# Patient Record
Sex: MALE | Race: WHITE | Employment: UNEMPLOYED | URBAN - METROPOLITAN AREA
[De-identification: names, ages, dates, MRNs, and addresses within clinical notes are randomized per-mention and may not be internally consistent; named-entity substitution may affect disease eponyms.]

---

## 2018-11-09 ENCOUNTER — HOSPITAL ENCOUNTER (EMERGENCY)
Age: 23
Discharge: HOME | End: 2018-11-09
Attending: EMERGENCY MEDICINE

## 2018-11-09 VITALS
HEIGHT: 71 IN | DIASTOLIC BLOOD PRESSURE: 70 MMHG | RESPIRATION RATE: 18 BRPM | HEART RATE: 62 BPM | TEMPERATURE: 98.4 F | SYSTOLIC BLOOD PRESSURE: 106 MMHG | OXYGEN SATURATION: 100 % | WEIGHT: 145.94 LBS | BODY MASS INDEX: 20.43 KG/M2

## 2018-11-09 DIAGNOSIS — K08.89 PAIN, DENTAL: Primary | ICD-10-CM

## 2018-11-09 DIAGNOSIS — K02.9 DENTAL CARIES: ICD-10-CM

## 2018-11-09 PROCEDURE — 6370000000 HC RX 637 (ALT 250 FOR IP): Performed by: EMERGENCY MEDICINE

## 2018-11-09 PROCEDURE — 99282 EMERGENCY DEPT VISIT SF MDM: CPT

## 2018-11-09 RX ORDER — AMOXICILLIN 250 MG/1
500 CAPSULE ORAL ONCE
Status: COMPLETED | OUTPATIENT
Start: 2018-11-09 | End: 2018-11-09

## 2018-11-09 RX ORDER — IBUPROFEN 600 MG/1
600 TABLET ORAL ONCE
Status: DISCONTINUED | OUTPATIENT
Start: 2018-11-09 | End: 2018-11-09 | Stop reason: HOSPADM

## 2018-11-09 RX ORDER — AMOXICILLIN 500 MG/1
500 CAPSULE ORAL 3 TIMES DAILY
Qty: 30 CAPSULE | Refills: 0 | Status: SHIPPED | OUTPATIENT
Start: 2018-11-09 | End: 2018-11-19

## 2018-11-09 RX ORDER — IBUPROFEN 600 MG/1
600 TABLET ORAL EVERY 6 HOURS PRN
Qty: 20 TABLET | Refills: 0 | Status: SHIPPED | OUTPATIENT
Start: 2018-11-09 | End: 2020-06-19 | Stop reason: ALTCHOICE

## 2018-11-09 RX ADMIN — AMOXICILLIN 500 MG: 250 CAPSULE ORAL at 11:08

## 2018-11-09 ASSESSMENT — PAIN DESCRIPTION - DESCRIPTORS: DESCRIPTORS: DISCOMFORT

## 2018-11-09 ASSESSMENT — PAIN DESCRIPTION - PAIN TYPE: TYPE: ACUTE PAIN

## 2018-11-09 ASSESSMENT — PAIN - FUNCTIONAL ASSESSMENT: PAIN_FUNCTIONAL_ASSESSMENT: 0-10

## 2018-11-09 ASSESSMENT — PAIN SCALES - GENERAL
PAINLEVEL_OUTOF10: 10
PAINLEVEL_OUTOF10: 10

## 2018-11-09 ASSESSMENT — PAIN DESCRIPTION - PROGRESSION: CLINICAL_PROGRESSION: NOT CHANGED

## 2018-11-09 ASSESSMENT — PAIN DESCRIPTION - LOCATION: LOCATION: MOUTH

## 2019-07-02 ENCOUNTER — HOSPITAL ENCOUNTER (EMERGENCY)
Age: 24
Discharge: HOME | End: 2019-07-02
Attending: EMERGENCY MEDICINE

## 2019-07-02 VITALS
RESPIRATION RATE: 14 BRPM | BODY MASS INDEX: 20.52 KG/M2 | OXYGEN SATURATION: 100 % | SYSTOLIC BLOOD PRESSURE: 120 MMHG | HEART RATE: 54 BPM | DIASTOLIC BLOOD PRESSURE: 87 MMHG | WEIGHT: 143.3 LBS | TEMPERATURE: 97.6 F | HEIGHT: 70 IN

## 2019-07-02 DIAGNOSIS — K04.7 DENTAL INFECTION: Primary | ICD-10-CM

## 2019-07-02 PROCEDURE — 99282 EMERGENCY DEPT VISIT SF MDM: CPT

## 2019-07-02 PROCEDURE — 6370000000 HC RX 637 (ALT 250 FOR IP): Performed by: EMERGENCY MEDICINE

## 2019-07-02 PROCEDURE — 4500000022 HC ED LEVEL 2 PROCEDURE

## 2019-07-02 RX ORDER — IBUPROFEN 600 MG/1
600 TABLET ORAL ONCE
Status: COMPLETED | OUTPATIENT
Start: 2019-07-02 | End: 2019-07-02

## 2019-07-02 RX ORDER — AMOXICILLIN AND CLAVULANATE POTASSIUM 875; 125 MG/1; MG/1
1 TABLET, FILM COATED ORAL 2 TIMES DAILY
Qty: 20 TABLET | Refills: 0 | Status: SHIPPED | OUTPATIENT
Start: 2019-07-02 | End: 2019-07-03

## 2019-07-02 RX ORDER — AMOXICILLIN AND CLAVULANATE POTASSIUM 875; 125 MG/1; MG/1
1 TABLET, FILM COATED ORAL EVERY 12 HOURS SCHEDULED
Status: DISCONTINUED | OUTPATIENT
Start: 2019-07-02 | End: 2019-07-02 | Stop reason: HOSPADM

## 2019-07-02 RX ADMIN — AMOXICILLIN AND CLAVULANATE POTASSIUM 1 TABLET: 875; 125 TABLET, FILM COATED ORAL at 18:21

## 2019-07-02 RX ADMIN — IBUPROFEN 600 MG: 600 TABLET ORAL at 18:20

## 2019-07-02 ASSESSMENT — ENCOUNTER SYMPTOMS
WHEEZING: 0
FACIAL SWELLING: 0
VOICE CHANGE: 0
TROUBLE SWALLOWING: 0
SHORTNESS OF BREATH: 0
TRISMUS: 0

## 2019-07-02 ASSESSMENT — PAIN DESCRIPTION - LOCATION: LOCATION: TEETH

## 2019-07-02 ASSESSMENT — PAIN DESCRIPTION - ORIENTATION: ORIENTATION: RIGHT

## 2019-07-02 ASSESSMENT — PAIN DESCRIPTION - FREQUENCY: FREQUENCY: CONTINUOUS

## 2019-07-02 ASSESSMENT — PAIN SCALES - GENERAL
PAINLEVEL_OUTOF10: 10
PAINLEVEL_OUTOF10: 9
PAINLEVEL_OUTOF10: 3

## 2019-07-02 ASSESSMENT — PAIN DESCRIPTION - PAIN TYPE: TYPE: ACUTE PAIN

## 2019-07-02 NOTE — ED PROVIDER NOTES
SURGICAL HISTORY       Past Surgical History:   Procedure Laterality Date    NASAL SEPTUM SURGERY      TONSILLECTOMY      TYMPANOSTOMY TUBE PLACEMENT           CURRENT MEDICATIONS       Previous Medications    IBUPROFEN (IBU) 600 MG TABLET    Take 1 tablet by mouth every 6 hours as needed for Pain       ALLERGIES     Clindamycin; Clindamycin/lincomycin; and Azithromycin    FAMILY HISTORY       Family History   Problem Relation Age of Onset    Diabetes Mother     Depression Mother     Substance Abuse Mother     High Blood Pressure Maternal Grandmother     Mental Illness Father     Substance Abuse Brother           SOCIAL HISTORY       Social History     Socioeconomic History    Marital status: Single     Spouse name: Not on file    Number of children: Not on file    Years of education: Not on file    Highest education level: Not on file   Occupational History    Occupation: unemployed   Social Needs    Financial resource strain: Not on file    Food insecurity:     Worry: Not on file     Inability: Not on file    Transportation needs:     Medical: Not on file     Non-medical: Not on file   Tobacco Use    Smoking status: Current Every Day Smoker     Packs/day: 1.00     Years: 6.00     Pack years: 6.00     Types: Cigarettes    Smokeless tobacco: Never Used   Substance and Sexual Activity    Alcohol use: No     Comment: social    Drug use: No     Frequency: 7.0 times per week     Types: Marijuana, IV, Opiates      Comment: heroin, last used   12/20/2017    Sexual activity: Not on file   Lifestyle    Physical activity:     Days per week: Not on file     Minutes per session: Not on file    Stress: Not on file   Relationships    Social connections:     Talks on phone: Not on file     Gets together: Not on file     Attends Taoism service: Not on file     Active member of club or organization: Not on file     Attends meetings of clubs or organizations: Not on file     Relationship status:

## 2019-07-03 ENCOUNTER — HOSPITAL ENCOUNTER (EMERGENCY)
Age: 24
Discharge: HOME | End: 2019-07-03
Attending: EMERGENCY MEDICINE

## 2019-07-03 VITALS
BODY MASS INDEX: 20.47 KG/M2 | WEIGHT: 143 LBS | SYSTOLIC BLOOD PRESSURE: 145 MMHG | HEART RATE: 56 BPM | HEIGHT: 70 IN | RESPIRATION RATE: 14 BRPM | TEMPERATURE: 98.4 F | DIASTOLIC BLOOD PRESSURE: 85 MMHG | OXYGEN SATURATION: 98 %

## 2019-07-03 DIAGNOSIS — K02.9 DENTAL CARIES: ICD-10-CM

## 2019-07-03 DIAGNOSIS — K04.7 DENTAL ABSCESS: Primary | ICD-10-CM

## 2019-07-03 PROCEDURE — 6370000000 HC RX 637 (ALT 250 FOR IP): Performed by: EMERGENCY MEDICINE

## 2019-07-03 PROCEDURE — 99282 EMERGENCY DEPT VISIT SF MDM: CPT

## 2019-07-03 RX ORDER — AMOXICILLIN 250 MG/1
500 CAPSULE ORAL ONCE
Status: COMPLETED | OUTPATIENT
Start: 2019-07-03 | End: 2019-07-03

## 2019-07-03 RX ORDER — AMOXICILLIN 500 MG/1
500 CAPSULE ORAL 3 TIMES DAILY
Qty: 30 CAPSULE | Refills: 0 | Status: SHIPPED | OUTPATIENT
Start: 2019-07-03 | End: 2019-07-13

## 2019-07-03 RX ADMIN — AMOXICILLIN 500 MG: 250 CAPSULE ORAL at 08:23

## 2019-07-03 ASSESSMENT — PAIN SCALES - GENERAL
PAINLEVEL_OUTOF10: 9

## 2019-07-03 ASSESSMENT — PAIN DESCRIPTION - FREQUENCY: FREQUENCY: CONTINUOUS

## 2019-07-03 ASSESSMENT — PAIN - FUNCTIONAL ASSESSMENT: PAIN_FUNCTIONAL_ASSESSMENT: INTOLERABLE, UNABLE TO DO ANY ACTIVE OR PASSIVE ACTIVITIES

## 2019-07-03 ASSESSMENT — PAIN DESCRIPTION - DESCRIPTORS: DESCRIPTORS: ACHING;THROBBING

## 2019-07-03 ASSESSMENT — PAIN DESCRIPTION - LOCATION: LOCATION: TEETH

## 2019-07-03 ASSESSMENT — PAIN DESCRIPTION - PAIN TYPE: TYPE: CHRONIC PAIN

## 2019-07-03 NOTE — ED PROVIDER NOTES
[id number]      Pt Name: Tong Sullivan  MRN: [id number]  [place] 1995  Date of evaluation: 7/3/2019  Provider: Katlyn Salazar [address]  Chief Complaint   Patient presents with    Dental Pain     right lower jaw with edema. He was seen here yesterday and had dental block  Unable to afford augmentin. Increased edema since yesterday  was given [facility] voucher for meds       HPI  Mack Snow is a 21 y.o. male who presents with toothache in the lower right side. He was seen here last night for similar complaints. He is placed on Augmentin. He has not been on for some Augmentin because of cost $80.  He is going to homeless shelter. He denies any fevers or chills. Denies any nausea or vomiting. He states the swelling is gotten worse since last night. He did a dental block last night. Patient states he laid outside all night long. He is waiting for his uncle to pick them up. He is going to go to a shelter. REVIEW OF SYSTEMS  All systems negative except as noted in the HPI. Reviewed Nurses' notes and concur. No LMP for male patient. PAST MEDICAL HISTORY  Past Medical History:   Diagnosis Date    ADHD (attention deficit hyperactivity disorder)     Cyst     testicles    Depression     Drug abuse, IV ([address]     heroine    Hemorrhoids     Heroin use        FAMILY HISTORY  Family History   Problem Relation Age of Onset    Diabetes Mother     Depression Mother     Substance Abuse Mother     High Blood Pressure Maternal Grandmother     Mental Illness Father     Substance Abuse Brother        SOCIAL HISTORY   reports that he has been smoking cigarettes. He has a 6.00 pack-year smoking history. He has never used smokeless tobacco. He reports that he does not drink alcohol or use drugs.     SURGICAL HISTORY  Past Surgical History:   Procedure Laterality Date    NASAL SEPTUM SURGERY      TONSILLECTOMY

## 2019-10-24 ENCOUNTER — HOSPITAL ENCOUNTER (EMERGENCY)
Age: 24
Discharge: HOME | End: 2019-10-24

## 2019-10-24 VITALS
TEMPERATURE: 99.1 F | SYSTOLIC BLOOD PRESSURE: 114 MMHG | HEART RATE: 103 BPM | DIASTOLIC BLOOD PRESSURE: 75 MMHG | BODY MASS INDEX: 21 KG/M2 | OXYGEN SATURATION: 96 % | RESPIRATION RATE: 18 BRPM | WEIGHT: 146.39 LBS

## 2019-10-24 DIAGNOSIS — J02.0 STREPTOCOCCAL PHARYNGITIS: Primary | ICD-10-CM

## 2019-10-24 LAB — S PYO AG THROAT QL: POSITIVE

## 2019-10-24 PROCEDURE — 6370000000 HC RX 637 (ALT 250 FOR IP): Performed by: PHYSICIAN ASSISTANT

## 2019-10-24 PROCEDURE — 99283 EMERGENCY DEPT VISIT LOW MDM: CPT

## 2019-10-24 PROCEDURE — 87880 STREP A ASSAY W/OPTIC: CPT

## 2019-10-24 RX ORDER — LIDOCAINE HYDROCHLORIDE 20 MG/ML
15 SOLUTION OROPHARYNGEAL ONCE
Status: COMPLETED | OUTPATIENT
Start: 2019-10-24 | End: 2019-10-24

## 2019-10-24 RX ORDER — LIDOCAINE HYDROCHLORIDE 20 MG/ML
15 SOLUTION OROPHARYNGEAL PRN
Qty: 1 BOTTLE | Refills: 0 | Status: SHIPPED | OUTPATIENT
Start: 2019-10-24 | End: 2020-06-19 | Stop reason: ALTCHOICE

## 2019-10-24 RX ORDER — AMOXICILLIN 500 MG/1
500 CAPSULE ORAL 2 TIMES DAILY
Qty: 20 CAPSULE | Refills: 0 | Status: SHIPPED | OUTPATIENT
Start: 2019-10-24 | End: 2019-11-03

## 2019-10-24 RX ADMIN — LIDOCAINE HYDROCHLORIDE 15 ML: 20 SOLUTION ORAL; TOPICAL at 15:13

## 2019-10-24 ASSESSMENT — PAIN SCALES - GENERAL: PAINLEVEL_OUTOF10: 7

## 2019-10-24 ASSESSMENT — PAIN DESCRIPTION - PROGRESSION: CLINICAL_PROGRESSION: GRADUALLY WORSENING

## 2019-10-24 ASSESSMENT — PAIN DESCRIPTION - PAIN TYPE: TYPE: ACUTE PAIN

## 2019-10-24 ASSESSMENT — PAIN DESCRIPTION - ONSET: ONSET: PROGRESSIVE

## 2019-10-24 ASSESSMENT — PAIN DESCRIPTION - LOCATION: LOCATION: THROAT

## 2020-04-24 ENCOUNTER — HOSPITAL ENCOUNTER (EMERGENCY)
Age: 25
Discharge: HOME | End: 2020-04-24
Attending: EMERGENCY MEDICINE

## 2020-04-24 VITALS
RESPIRATION RATE: 14 BRPM | HEART RATE: 82 BPM | OXYGEN SATURATION: 100 % | WEIGHT: 151.24 LBS | SYSTOLIC BLOOD PRESSURE: 121 MMHG | BODY MASS INDEX: 21.7 KG/M2 | TEMPERATURE: 99.5 F | DIASTOLIC BLOOD PRESSURE: 65 MMHG

## 2020-04-24 PROCEDURE — 99282 EMERGENCY DEPT VISIT SF MDM: CPT

## 2020-04-24 RX ORDER — PENICILLIN V POTASSIUM 500 MG/1
500 TABLET ORAL 4 TIMES DAILY
Qty: 40 TABLET | Refills: 0 | Status: SHIPPED | OUTPATIENT
Start: 2020-04-24 | End: 2020-05-04

## 2020-04-24 ASSESSMENT — PAIN DESCRIPTION - ONSET
ONSET: GRADUAL
ONSET: GRADUAL

## 2020-04-24 ASSESSMENT — PAIN DESCRIPTION - PROGRESSION
CLINICAL_PROGRESSION: GRADUALLY WORSENING
CLINICAL_PROGRESSION: NOT CHANGED

## 2020-04-24 ASSESSMENT — PAIN DESCRIPTION - DESCRIPTORS
DESCRIPTORS: THROBBING
DESCRIPTORS: THROBBING

## 2020-04-24 ASSESSMENT — PAIN DESCRIPTION - LOCATION
LOCATION: MOUTH
LOCATION: MOUTH

## 2020-04-24 ASSESSMENT — PAIN DESCRIPTION - FREQUENCY
FREQUENCY: CONTINUOUS
FREQUENCY: CONTINUOUS

## 2020-04-24 ASSESSMENT — PAIN DESCRIPTION - ORIENTATION
ORIENTATION: RIGHT;LOWER
ORIENTATION: RIGHT;LOWER

## 2020-04-24 ASSESSMENT — PAIN DESCRIPTION - PAIN TYPE
TYPE: ACUTE PAIN
TYPE: ACUTE PAIN

## 2020-04-24 ASSESSMENT — PAIN SCALES - GENERAL
PAINLEVEL_OUTOF10: 7
PAINLEVEL_OUTOF10: 7

## 2020-04-24 ASSESSMENT — PAIN - FUNCTIONAL ASSESSMENT: PAIN_FUNCTIONAL_ASSESSMENT: 0-10

## 2020-04-24 NOTE — ED PROVIDER NOTES
of motion. Radial and dorsalis pedis pulses were intact. No calf tenderness erythema or edema. Neurological: Alert and oriented x 3. Speech clear. Cranial nerves II-XII intact. No facial droop. No acute focal motor or sensory deficits. Skin: Skin is warm and dry. No rash. Lymphatic:  No lympadenopathy. Psychiatric: Normal mood and affect. Behavior is normal.         DIAGNOSTIC RESULTS     EKG: All EKG's are interpreted by the Emergency Department Physician who either signs or Co-signs this chart in the absence of a cardiologist.        RADIOLOGY:   Non-plain film images such as CT, Ultrasound and MRI are read by the radiologist. Plain radiographic images are visualized and preliminarily interpreted by the emergency physician with the below findings:        Interpretation per the Radiologist below, if available at the time of this note:    No orders to display         ED BEDSIDE ULTRASOUND:   Performed by ED Physician - none    LABS:  Labs Reviewed - No data to display    All other labs were within normal range or not returned as of this dictation. EMERGENCY DEPARTMENT COURSE and DIFFERENTIAL DIAGNOSIS/MDM:   Vitals:    Vitals:    04/24/20 0942   BP: 121/65   Pulse: 82   Resp: 14   Temp: 99.5 °F (37.5 °C)   TempSrc: Oral   SpO2: 100%   Weight: 151 lb 3.8 oz (68.6 kg)       Patient presented with dental pain as noted above. He does have some gingival soft tissue swelling surrounding the right mandibular third molar was severely decayed and fractured. No evidence of abscess at this time. No evidence of airway compromise. He is afebrile. He is stable for outpatient management. He will be started on penicillin 500 mg 4 times daily for 10 days. Advised him to follow-up with a dentist as soon as possible or new symptoms develop, he was advised to return immediately to the emergency department. Advised him to drink plenty of fluids.   I advised him to follow-up with a primary care physician as

## 2020-04-24 NOTE — ED TRIAGE NOTES
Pt arrived to the ED via private vehicle from home. Pt c/o dental pain, right lower. Right side of face is swollen. Pain 7/10.  Pt states he did use fentanyl this morning

## 2020-06-19 ENCOUNTER — HOSPITAL ENCOUNTER (EMERGENCY)
Age: 25
Discharge: HOME | End: 2020-06-19
Attending: EMERGENCY MEDICINE

## 2020-06-19 ENCOUNTER — APPOINTMENT (OUTPATIENT)
Dept: GENERAL RADIOLOGY | Age: 25
End: 2020-06-19

## 2020-06-19 VITALS
RESPIRATION RATE: 16 BRPM | HEIGHT: 70 IN | TEMPERATURE: 98 F | DIASTOLIC BLOOD PRESSURE: 78 MMHG | OXYGEN SATURATION: 98 % | WEIGHT: 140 LBS | HEART RATE: 90 BPM | SYSTOLIC BLOOD PRESSURE: 130 MMHG | BODY MASS INDEX: 20.04 KG/M2

## 2020-06-19 PROCEDURE — 73610 X-RAY EXAM OF ANKLE: CPT

## 2020-06-19 PROCEDURE — 6370000000 HC RX 637 (ALT 250 FOR IP): Performed by: EMERGENCY MEDICINE

## 2020-06-19 PROCEDURE — 73630 X-RAY EXAM OF FOOT: CPT

## 2020-06-19 PROCEDURE — 87070 CULTURE OTHR SPECIMN AEROBIC: CPT

## 2020-06-19 PROCEDURE — 87205 SMEAR GRAM STAIN: CPT

## 2020-06-19 PROCEDURE — 87077 CULTURE AEROBIC IDENTIFY: CPT

## 2020-06-19 PROCEDURE — 99283 EMERGENCY DEPT VISIT LOW MDM: CPT

## 2020-06-19 RX ORDER — SULFAMETHOXAZOLE AND TRIMETHOPRIM 800; 160 MG/1; MG/1
2 TABLET ORAL ONCE
Status: COMPLETED | OUTPATIENT
Start: 2020-06-19 | End: 2020-06-19

## 2020-06-19 RX ORDER — LORAZEPAM 1 MG/1
1 TABLET ORAL ONCE
Status: COMPLETED | OUTPATIENT
Start: 2020-06-19 | End: 2020-06-19

## 2020-06-19 RX ORDER — CEPHALEXIN 250 MG/1
1000 CAPSULE ORAL ONCE
Status: COMPLETED | OUTPATIENT
Start: 2020-06-19 | End: 2020-06-19

## 2020-06-19 RX ORDER — SULFAMETHOXAZOLE AND TRIMETHOPRIM 800; 160 MG/1; MG/1
1 TABLET ORAL 2 TIMES DAILY
Qty: 20 TABLET | Refills: 0 | Status: SHIPPED | OUTPATIENT
Start: 2020-06-19 | End: 2020-06-29

## 2020-06-19 RX ORDER — IBUPROFEN 800 MG/1
800 TABLET ORAL EVERY 8 HOURS PRN
Qty: 30 TABLET | Refills: 0 | Status: SHIPPED | OUTPATIENT
Start: 2020-06-19 | End: 2020-09-21

## 2020-06-19 RX ORDER — CEPHALEXIN 500 MG/1
500 CAPSULE ORAL 4 TIMES DAILY
Qty: 40 CAPSULE | Refills: 0 | Status: SHIPPED | OUTPATIENT
Start: 2020-06-19 | End: 2020-06-29

## 2020-06-19 RX ORDER — IBUPROFEN 400 MG/1
800 TABLET ORAL ONCE
Status: COMPLETED | OUTPATIENT
Start: 2020-06-19 | End: 2020-06-19

## 2020-06-19 RX ADMIN — LORAZEPAM 1 MG: 1 TABLET ORAL at 12:36

## 2020-06-19 RX ADMIN — IBUPROFEN 800 MG: 400 TABLET, FILM COATED ORAL at 12:36

## 2020-06-19 RX ADMIN — SULFAMETHOXAZOLE AND TRIMETHOPRIM 2 TABLET: 800; 160 TABLET ORAL at 12:37

## 2020-06-19 RX ADMIN — CEPHALEXIN 1000 MG: 250 CAPSULE ORAL at 12:36

## 2020-06-19 ASSESSMENT — PAIN DESCRIPTION - FREQUENCY
FREQUENCY: CONTINUOUS

## 2020-06-19 ASSESSMENT — PAIN SCALES - GENERAL
PAINLEVEL_OUTOF10: 10
PAINLEVEL_OUTOF10: 10
PAINLEVEL_OUTOF10: 5
PAINLEVEL_OUTOF10: 10

## 2020-06-19 ASSESSMENT — PAIN DESCRIPTION - LOCATION
LOCATION: ANKLE;FOOT
LOCATION: ANKLE
LOCATION: ANKLE;FOOT

## 2020-06-19 ASSESSMENT — PAIN DESCRIPTION - ONSET
ONSET: SUDDEN
ONSET: PROGRESSIVE
ONSET: PROGRESSIVE

## 2020-06-19 ASSESSMENT — PAIN DESCRIPTION - PAIN TYPE
TYPE: ACUTE PAIN

## 2020-06-19 ASSESSMENT — PAIN DESCRIPTION - DESCRIPTORS
DESCRIPTORS: ACHING;CONSTANT
DESCRIPTORS: ACHING
DESCRIPTORS: ACHING

## 2020-06-19 ASSESSMENT — PAIN - FUNCTIONAL ASSESSMENT
PAIN_FUNCTIONAL_ASSESSMENT: PREVENTS OR INTERFERES SOME ACTIVE ACTIVITIES AND ADLS
PAIN_FUNCTIONAL_ASSESSMENT: PREVENTS OR INTERFERES SOME ACTIVE ACTIVITIES AND ADLS
PAIN_FUNCTIONAL_ASSESSMENT: ACTIVITIES ARE NOT PREVENTED

## 2020-06-19 ASSESSMENT — PAIN DESCRIPTION - ORIENTATION
ORIENTATION: RIGHT

## 2020-06-19 ASSESSMENT — PAIN DESCRIPTION - PROGRESSION
CLINICAL_PROGRESSION: NOT CHANGED
CLINICAL_PROGRESSION: NOT CHANGED
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
## 2020-06-19 NOTE — ED NOTES
[id number] EMD and RN to bedside. I&D to right ankle abscess. Small amt bloody pus drainage noted. Will not allow EMD to push on wound. this RN cleaned wound with hibiclens and saline. just touching area adjacent to I&D site caused mod amt of pus drainage to come from wound (pt agreeable to this). Area cleaned again and 4x4 gauze and roll gauze bandage applied. Home wound care teaching with pt. Pt asking for pain medicine and crutches. Once this RN finished bandaging wound, pt standing up and pt says \"I can't walk on it, it hurts too much. \"  Pt asking for the doctor to come talk to him so he can ask for crutches himself (Dr Ceja updated about pt's request)     Lida Mosher RN  06/19/20 7868

## 2020-06-21 LAB
GRAM STAIN RESULT: ABNORMAL
ORGANISM: ABNORMAL
WOUND/ABSCESS: ABNORMAL

## 2020-07-25 ENCOUNTER — HOSPITAL ENCOUNTER (EMERGENCY)
Age: 25
Discharge: HOME | End: 2020-07-25
Attending: EMERGENCY MEDICINE

## 2020-07-25 ENCOUNTER — APPOINTMENT (OUTPATIENT)
Dept: GENERAL RADIOLOGY | Age: 25
End: 2020-07-25

## 2020-07-25 VITALS
OXYGEN SATURATION: 96 % | HEART RATE: 81 BPM | RESPIRATION RATE: 20 BRPM | DIASTOLIC BLOOD PRESSURE: 67 MMHG | SYSTOLIC BLOOD PRESSURE: 120 MMHG | TEMPERATURE: 101.4 F

## 2020-07-25 PROCEDURE — 6370000000 HC RX 637 (ALT 250 FOR IP): Performed by: EMERGENCY MEDICINE

## 2020-07-25 PROCEDURE — 73630 X-RAY EXAM OF FOOT: CPT

## 2020-07-25 PROCEDURE — 99283 EMERGENCY DEPT VISIT LOW MDM: CPT

## 2020-07-25 RX ORDER — CEPHALEXIN 500 MG/1
500 CAPSULE ORAL 4 TIMES DAILY
Qty: 28 CAPSULE | Refills: 0 | Status: SHIPPED | OUTPATIENT
Start: 2020-07-25 | End: 2020-08-01

## 2020-07-25 RX ORDER — SULFAMETHOXAZOLE AND TRIMETHOPRIM 800; 160 MG/1; MG/1
1 TABLET ORAL 2 TIMES DAILY
Qty: 14 TABLET | Refills: 0 | Status: SHIPPED | OUTPATIENT
Start: 2020-07-25 | End: 2020-07-25 | Stop reason: SDUPTHER

## 2020-07-25 RX ORDER — ACETAMINOPHEN 500 MG
1000 TABLET ORAL ONCE
Status: COMPLETED | OUTPATIENT
Start: 2020-07-25 | End: 2020-07-25

## 2020-07-25 RX ORDER — DOXYCYCLINE HYCLATE 100 MG
100 TABLET ORAL ONCE
Status: COMPLETED | OUTPATIENT
Start: 2020-07-25 | End: 2020-07-25

## 2020-07-25 RX ORDER — SULFAMETHOXAZOLE AND TRIMETHOPRIM 800; 160 MG/1; MG/1
1 TABLET ORAL 2 TIMES DAILY
Qty: 14 TABLET | Refills: 0 | Status: SHIPPED | OUTPATIENT
Start: 2020-07-25 | End: 2020-08-01

## 2020-07-25 RX ORDER — CEPHALEXIN 500 MG/1
500 CAPSULE ORAL 4 TIMES DAILY
Qty: 28 CAPSULE | Refills: 0 | Status: SHIPPED | OUTPATIENT
Start: 2020-07-25 | End: 2020-07-25 | Stop reason: SDUPTHER

## 2020-07-25 RX ORDER — DOXYCYCLINE 100 MG/1
100 TABLET ORAL 2 TIMES DAILY
Qty: 20 TABLET | Refills: 0 | Status: SHIPPED | OUTPATIENT
Start: 2020-07-25 | End: 2020-07-25 | Stop reason: ALTCHOICE

## 2020-07-25 RX ADMIN — ACETAMINOPHEN 1000 MG: 500 TABLET, FILM COATED ORAL at 18:30

## 2020-07-25 RX ADMIN — DOXYCYCLINE HYCLATE 100 MG: 100 TABLET, COATED ORAL at 18:30

## 2020-07-25 ASSESSMENT — PAIN SCALES - GENERAL
PAINLEVEL_OUTOF10: 7

## 2020-07-25 ASSESSMENT — PAIN DESCRIPTION - DESCRIPTORS: DESCRIPTORS: ACHING

## 2020-07-25 ASSESSMENT — PAIN DESCRIPTION - ORIENTATION: ORIENTATION: LEFT

## 2020-07-25 ASSESSMENT — PAIN DESCRIPTION - LOCATION: LOCATION: ANKLE;FOOT

## 2020-07-25 NOTE — ED NOTES
Patient returned to ER, unable to find his discharge instructions or prescriptions     Austin Cardoso RN  07/25/20 1956

## 2020-07-25 NOTE — ED PROVIDER NOTES
1.00     Years: 6.00     Pack years: 6.00     Types: Cigarettes    Smokeless tobacco: Never Used   Substance and Sexual Activity    Alcohol use: No     Comment: social    Drug use: Yes     Frequency: 7.0 times per week     Types: Marijuana, IV, Opiates      Comment: on methadone but states he slips up    Sexual activity: Not on file   Lifestyle    Physical activity     Days per week: Not on file     Minutes per session: Not on file    Stress: Not on file   Relationships    Social connections     Talks on phone: Not on file     Gets together: Not on file     Attends Advent service: Not on file     Active member of club or organization: Not on file     Attends meetings of clubs or organizations: Not on file     Relationship status: Not on file    Intimate partner violence     Fear of current or ex partner: Not on file     Emotionally abused: Not on file     Physically abused: Not on file     Forced sexual activity: Not on file   Other Topics Concern    Not on file   Social History Narrative    Not on file     No current facility-administered medications for this encounter.       Current Outpatient Medications   Medication Sig Dispense Refill    cephALEXin (KEFLEX) 500 MG capsule Take 1 capsule by mouth 4 times daily for 7 days 28 capsule 0    sulfamethoxazole-trimethoprim (BACTRIM DS) 800-160 MG per tablet Take 1 tablet by mouth 2 times daily for 7 days 14 tablet 0    ibuprofen (ADVIL;MOTRIN) 800 MG tablet Take 1 tablet by mouth every 8 hours as needed for Pain 30 tablet 0    METHADONE HCL PO Take by mouth       Allergies   Allergen Reactions    Clindamycin     Clindamycin/Lincomycin Itching    Azithromycin Itching         REVIEW OF SYSTEMS  10 systems reviewed, pertinent positives per HPI otherwise noted to be negative    PHYSICAL EXAM  /67   Pulse 86   Temp 101.4 °F (38.6 °C) (Oral)   Resp 20   SpO2 97%      CONSTITUTIONAL: AOx4, cooperative with exam, febrile   HEAD: normocephalic, atraumatic   EYES: PERRL, EOMI, anicteric sclera   ENT: Moist mucous membranes, uvula midline   LUNGS: Bilateral breath sounds, CTAB, no rales/ronchi/wheezes   CARDIOVASCULAR: RRR, normal S1/S2, no m/r/g, 2+ pulses throughout   ABDOMEN: Soft, non-tender, non-distended, +BS   NEUROLOGIC:  MAEx4, 5/5 strength throughout; fine touch sensation intact throughout; normal gait; finger-to-nose testing normal;   MUSCULOSKELETAL:  Mild tenderness to the dorsal aspect of the left foot, no deformity, erythema as stated below, no fluctuance, no drainage, no tenderness of the medial lateral malleolus of the left ankle, Achilles tendon intact and nontender, DP pulse 2+   SKIN: No rash, 3 cm x 3 cm area of erythema on the dorsal medial aspect of the left foot with small central scab          RADIOLOGY  X-RAYS:  I have reviewed radiologic plain film image(s). ALL OTHER NON-PLAIN FILM IMAGES SUCH AS CT, ULTRASOUND AND MRI HAVE BEEN READ BY THE RADIOLOGIST. XR FOOT LEFT (MIN 3 VIEWS)   Final Result   Suspected soft tissue swelling in the medial left hindfoot potentially due to   cellulitis given the clinical findings. No underlying radiopaque foreign   body is identified. EKG INTERPRETATION  None    PROCEDURES    ED COURSE/MDM  Cellulitis, foreign body, contusion, fracture    Patient seen and evaluated. History and physical as above. Nontoxic, afebrile. Patient with tenderness and redness to the dorsal aspect of the left foot. Patient does inject IV heroin in his feet but states he is not used his left foot and proximally 1 week. Plain film of the left foot was obtained which showed no evidence of foreign body or radiopaque object in his foot. Does show some soft tissue swelling consistent with cellulitis. Patient is febrile. Treated with Tylenol for fever. Started on doxycycline for antibiotic coverage. 6:10 PM EDT  Patient now very drowsy compared to when he arrived.   Patient does arouse to verbal stimulus and is maintaining his oxygen saturation. 6:41 PM EDT  Patient much more awake now. Discussed that he has cellulitis needs to start antibiotics. Patient requesting prescriptions that are free from the [address] [place] [address] Avenue. Patient's antibiotic switched to Keflex and Bactrim. Return instructions provided. All questions answered prior to discharge. I estimate there is LOW risk for CELLULITIS, COMPARTMENT SYNDROME, NECROTIZING FASCIITIS, TENDON OR NEUROVASCULAR INJURY, or FOREIGN BODY, thus I consider the discharge disposition reasonable. Also, there is no evidence or peritonitis, sepsis, or toxicity. Mack Sevilla and I have discussed the diagnosis and risks, and we agree with discharging home to follow-up with their primary doctor. We also discussed returning to the Emergency Department immediately if new or worsening symptoms occur. We have discussed the symptoms which are most concerning (e.g., changing or worsening pain, fever, numbness, weakness, cool or painful digits) that necessitate immediate return. Patient was given scripts for the following medications. I counseled patient how to take these medications. New Prescriptions    CEPHALEXIN (KEFLEX) 500 MG CAPSULE    Take 1 capsule by mouth 4 times daily for 7 days    SULFAMETHOXAZOLE-TRIMETHOPRIM (BACTRIM DS) 800-160 MG PER TABLET    Take 1 tablet by mouth 2 times daily for 7 days           CLINICAL IMPRESSION  1. Cellulitis of left foot        Blood pressure 120/67, pulse 86, temperature 101.4 °F (38.6 °C), temperature source Oral, resp. rate 20, SpO2 97 %. DISPOSITION  Patient was discharged to home in good condition. [facility]  [phone]  Call today  For a follow up appointment. Disclaimer: All medical record entries made by [employer] dictation.       (Please note that this note was completed with a voice recognition program. Every attempt was made to edit the dictations, but inevitably there remain words

## 2020-07-25 NOTE — ED NOTES
Reviewed AVS and discharge home care instructions with patient. Pt verbalized understanding and had no questions at this time. Pt sent home with prescription x2.      Leah Khan RN  07/25/20 2686

## 2020-08-05 ENCOUNTER — HOSPITAL ENCOUNTER (EMERGENCY)
Age: 25
Discharge: HOME | End: 2020-08-05

## 2020-08-05 VITALS
HEART RATE: 98 BPM | BODY MASS INDEX: 20.06 KG/M2 | HEIGHT: 71 IN | WEIGHT: 143.3 LBS | SYSTOLIC BLOOD PRESSURE: 120 MMHG | DIASTOLIC BLOOD PRESSURE: 70 MMHG | TEMPERATURE: 99 F | RESPIRATION RATE: 18 BRPM | OXYGEN SATURATION: 95 %

## 2020-08-05 PROCEDURE — 10060 I&D ABSCESS SIMPLE/SINGLE: CPT

## 2020-08-05 PROCEDURE — 99282 EMERGENCY DEPT VISIT SF MDM: CPT

## 2020-08-05 RX ORDER — SULFAMETHOXAZOLE AND TRIMETHOPRIM 800; 160 MG/1; MG/1
1 TABLET ORAL 2 TIMES DAILY
Qty: 20 TABLET | Refills: 0 | Status: SHIPPED | OUTPATIENT
Start: 2020-08-05 | End: 2020-08-15

## 2020-08-05 RX ORDER — CEPHALEXIN 500 MG/1
500 CAPSULE ORAL 4 TIMES DAILY
Qty: 40 CAPSULE | Refills: 0 | Status: SHIPPED | OUTPATIENT
Start: 2020-08-05 | End: 2020-09-21

## 2020-08-05 ASSESSMENT — ENCOUNTER SYMPTOMS
EYE DISCHARGE: 0
EYE REDNESS: 0
NAUSEA: 0
BACK PAIN: 0
ABDOMINAL PAIN: 0
SHORTNESS OF BREATH: 0
APNEA: 0
CHOKING: 0
FACIAL SWELLING: 0
VOMITING: 0

## 2020-08-05 ASSESSMENT — PAIN DESCRIPTION - LOCATION: LOCATION: FOOT

## 2020-08-05 ASSESSMENT — PAIN DESCRIPTION - FREQUENCY: FREQUENCY: CONTINUOUS

## 2020-08-05 ASSESSMENT — PAIN DESCRIPTION - DESCRIPTORS: DESCRIPTORS: ACHING;BURNING;THROBBING

## 2020-08-05 ASSESSMENT — PAIN DESCRIPTION - ORIENTATION: ORIENTATION: RIGHT

## 2020-08-05 ASSESSMENT — PAIN SCALES - GENERAL: PAINLEVEL_OUTOF10: 5

## 2020-08-05 ASSESSMENT — PAIN DESCRIPTION - PAIN TYPE: TYPE: ACUTE PAIN

## 2020-08-05 NOTE — ED PROVIDER NOTES
**EVALUATED BY ADVANCED PRACTICE PROVIDER**        [address] ENCOUNTER      Pt Name: Kan Mccallum  SAIRA:[id number]  [place] 1995  Date of evaluation: 8/5/2020  Provider: Félix Kahn PA-C      Chief Complaint:    Chief Complaint   Patient presents with    Abscess     to R foot x5-6 days, pain 5/10, worse when walking. Nursing Notes, Past Medical Hx, Past Surgical Hx, Social Hx, Allergies, and Family Hx were all reviewed and agreed with or any disagreements were addressed in the HPI.    HPI:  (Location, Duration, Timing, Severity, Quality, Assoc Sx, Context, Modifying factors)  This is a  31 y.o. male complaint of abscess to his right foot. Patient does admit to injecting heroin. But he said he does not inject into his foot. Present for the last week. He did state he poked at it with insulin needle. Got no drainage. Very tender. Denies fever. No numbness or tingling in his feet. No other complaints. PastMedical/Surgical History:      Diagnosis Date    ADHD (attention deficit hyperactivity disorder)     Cyst     testicles    Depression     Drug abuse, IV ([facility] [address]     heroine    Hemorrhoids     Heroin use          Procedure Laterality Date    NASAL SEPTUM SURGERY      TONSILLECTOMY      TYMPANOSTOMY TUBE PLACEMENT         Medications:  Previous Medications    IBUPROFEN (ADVIL;MOTRIN) 800 MG TABLET    Take 1 tablet by mouth every 8 hours as needed for Pain    METHADONE HCL PO    Take by mouth         Review of Systems:  Review of Systems   Constitutional: Negative for chills and fever. HENT: Negative for congestion, facial swelling and sneezing. Eyes: Negative for discharge and redness. Respiratory: Negative for apnea, choking and shortness of breath. Cardiovascular: Negative for chest pain. Gastrointestinal: Negative for abdominal pain, nausea and vomiting. Genitourinary: Negative for dysuria.    Musculoskeletal: Negative for back pain, neck pain and neck stiffness. Neurological: Negative for dizziness, tremors, seizures and headaches. All other systems reviewed and are negative. Positives and Pertinent negatives as per HPI. Except as noted above in the ROS, problem specific ROS was completed and is negative. Physical Exam:  Physical Exam  Vitals signs and nursing note reviewed. Constitutional:       Appearance: He is well-developed. He is not diaphoretic. HENT:      Head: Normocephalic and atraumatic. Nose: Nose normal.      Mouth/Throat:      Mouth: Mucous membranes are moist.      Pharynx: Oropharynx is clear. Eyes:      General:         Right eye: No discharge. Left eye: No discharge. Neck:      Musculoskeletal: Normal range of motion and neck supple. Cardiovascular:      Rate and Rhythm: Normal rate and regular rhythm. Heart sounds: Normal heart sounds. No murmur. No friction rub. No gallop. Pulmonary:      Effort: Pulmonary effort is normal. No respiratory distress. Breath sounds: Normal breath sounds. No wheezing or rales. Chest:      Chest wall: No tenderness. Musculoskeletal: Normal range of motion. Right foot: Normal range of motion and normal capillary refill. Tenderness and swelling present. No bony tenderness, crepitus or deformity. Skin:     General: Skin is warm and dry. Neurological:      Mental Status: He is alert and oriented to person, place, and time. Psychiatric:         Behavior: Behavior normal.         MEDICAL DECISION MAKING    Vitals:    Vitals:    08/05/20 1833   BP: 120/70   Pulse: 98   Resp: 18   Temp: 99 °F (37.2 °C)   TempSrc: Oral   SpO2: 95%   Weight: 143 lb 4.8 oz (65 kg)   Height: 5' 10.5\" (1.791 m)       LABS:Labs Reviewed - No data to display     Remainder of labs reviewed and werenegative at this time or not returned at the time of this note.     RADIOLOGY:   Non-plain film images such as CT, Ultrasound and MRI are read by the radiologist. Sommer Ricci PA-C have directly visualized the radiologic plain film image(s) with the below findings:        Interpretation per the Radiologist below, if available at the time of this note:    No orders to display        Xr Foot Left (min 3 Views)    Result Date: 7/25/2020  EXAMINATION: THREE XRAY VIEWS OF THE LEFT FOOT 7/25/2020 5:06 pm COMPARISON: Left foot radiograph 06/30/2016 HISTORY: ORDERING SYSTEM PROVIDED HISTORY: Pain on the medial dorsal aspect of the foot, possible retained needle from IVDU or injury TECHNOLOGIST PROVIDED HISTORY: Reason for exam:->Pain on the medial dorsal aspect of the foot, possible retained needle from IVDU or injury Reason for Exam: Pain on the medial dorsal aspect of the foot, possible retained needle from IVDU or injury Acuity: Acute Type of Exam: Initial Mechanism of Injury: ? FINDINGS: No acute fractures nor areas of abnormal cortical disruption. Normal variant bipartite tibial hallux sesamoid. Joints maintain anatomic alignment. No significant degenerative changes. Suspected soft tissue swelling in the medial hindfoot. No evident skin defect, soft tissue gas, nor radiopaque foreign body. Suspected soft tissue swelling in the medial left hindfoot potentially due to cellulitis given the clinical findings. No underlying radiopaque foreign body is identified. MEDICAL DECISION MAKING / ED COURSE:      PROCEDURES:   Procedures    None    Patient was given:  Medications - No data to display      Emergency room course: Patient on exam the right lower extremity hip was nontender knee nontender. Right foot shows abscess about 2 cm in diameter nonfluctuant. Indurated with a scab. There is a 4 cm area of erythema. Very tender with palpation. Full range of motion all digits capillary refill less than 2+, pedal pulse 2+. Full plantar dorsiflexion full inversion eversion. See photograph below.               Discussed patient option to have an incision and drainage and placed on antibiotics. Patient did not want to have it cut open. Just wants to be put on antibiotics. I informed him I will do that but he is apply warm compresses over the area 3-4 times a day. If that gets worse increased swelling, redness, fluid filled. It will need to be opened up. Patient verbally stated he understood. We did outline the area for him. The patient tolerated their visit well. I evaluated the patient. The physician was available for consultation as needed. The patient and / or the family were informed of the results of any tests, a time was given to answer questions, a plan was proposed and they agreed with plan. CLINICAL IMPRESSION:  1. Abscess of right foot        DISPOSITION Decision To Discharge 08/05/2020 06:49:12 PM      PATIENT REFERRED TO:  No follow-up provider specified.     DISCHARGE MEDICATIONS:  New Prescriptions    CEPHALEXIN (KEFLEX) 500 MG CAPSULE    Take 1 capsule by mouth 4 times daily    SULFAMETHOXAZOLE-TRIMETHOPRIM (BACTRIM DS) 800-160 MG PER TABLET    Take 1 tablet by mouth 2 times daily for 10 days       DISCONTINUED MEDICATIONS:  Discontinued Medications    No medications on file              (Please note the MDM and HPI sections of this note were completed with a voice recognition program.  Efforts were made to edit the dictations but occasionally words are mis-transcribed.)    Electronically signed, Deborah Patten PA-C,          Deborah Patten PA-C  08/05/20 0078

## 2020-08-05 NOTE — ED NOTES
Acknowledged pt by pt's name. Verified pt by name and date of birth. Checked arm band, allergies, reviewed past medical history. Introduced myself to patient  Duration of ED plan of care explained to patient  Explained planned tests and procedures  Thanked patient for coming to [facility].    Asked if there was anything else I could do for the patient before exiting room. CB in reach.      Melissa Garcia RN  08/05/20 8022

## 2020-09-21 ENCOUNTER — HOSPITAL ENCOUNTER (EMERGENCY)
Age: 25
Discharge: LEFT BEFORE DISCHARGE | End: 2020-09-21
Attending: EMERGENCY MEDICINE

## 2020-09-21 VITALS
HEART RATE: 71 BPM | OXYGEN SATURATION: 98 % | TEMPERATURE: 98 F | RESPIRATION RATE: 15 BRPM | HEIGHT: 71 IN | DIASTOLIC BLOOD PRESSURE: 73 MMHG | SYSTOLIC BLOOD PRESSURE: 99 MMHG | BODY MASS INDEX: 20.44 KG/M2 | WEIGHT: 146.01 LBS

## 2020-09-21 PROCEDURE — 99283 EMERGENCY DEPT VISIT LOW MDM: CPT

## 2020-09-21 RX ORDER — NALOXONE HYDROCHLORIDE 1 MG/ML
1 INJECTION INTRAMUSCULAR; INTRAVENOUS; SUBCUTANEOUS ONCE
Status: DISCONTINUED | OUTPATIENT
Start: 2020-09-21 | End: 2020-09-21

## 2020-09-21 ASSESSMENT — PAIN SCALES - GENERAL: PAINLEVEL_OUTOF10: 0

## 2020-09-21 NOTE — LETTER
[address] [facility] [address]  Phone: [phone]    Patient: Dany Aguilar  YOB: 1995  Date: 9/21/2020 Time: 7:19 PM    Leaving the Hospital Against Medical Advice    Chart #:[id number]    This will certify that I, the undersigned,    ______________________________________________________________________    A patient in the above named medical center, having requested discharge and removal from the medical center against the advice of my attending physician(s), hereby release the Emergency Department, its physicians, officers and employees, severally and individually, from any and all liability of any nature whatsoever for any injury or harm or complication of any kind that may result directly or indirectly, by reason of my terminating my stay as a patient from [facility], and hereby waive any and all rights of action I may now have or later acquire as a result of my voluntary departure from [facility] and the termination of my stay as a patient therein. This release is made with the full knowledge of the danger that may result from the action which I am taking.       Date:_______________________                         ___________________________                                                                                    Patient/Legal Representative    Witness:        ____________________________                          ___________________________  Nurse                                                                        Physician

## 2020-09-21 NOTE — ED PROVIDER NOTES
[address] [facility]  EMERGENCYDEPARTMENT ENCOUNTER      Pt Name: Taiwo Sanabria  MRN: [id number]  Nickgfrichard [id number] evaluation: 9/21/2020  Provider:Vahid Ordonez MD    CHIEF COMPLAINT       Chief Complaint   Patient presents with    Drug Overdose     states someone gave him 4 bars of xanax with fentanyl. Was not given narcan         HISTORY OF PRESENT ILLNESS    Mack Patterson is a 31 y.o. male who presents to the emergency department with alprazolam overdose. Patient says that he took 2 Xanax. Says it was not him to harm self. Says it was roughly an hour and a half 2 hours prior to EMSs arrival.  EMS says that the patient took fentanyl with this. No Narcan was given, patient is arousable although somnolent. Patient tells me that it was too Xanax but per report it was for bars. He says over 1 mg each. Nursing Notes were reviewed. REVIEW OF SYSTEMS       Review of Systems    10 point review of systems was performed and was negative exceptas specifically noted in the HPI.       PAST MEDICAL HISTORY     Past Medical History:   Diagnosis Date    ADHD (attention deficit hyperactivity disorder)     Cyst     testicles    Depression     Drug abuse, IV ([address]     heroine    Hemorrhoids     Heroin use          SURGICAL HISTORY       Past Surgical History:   Procedure Laterality Date    NASAL SEPTUM SURGERY      TONSILLECTOMY      TYMPANOSTOMY TUBE PLACEMENT           CURRENT MEDICATIONS       Previous Medications    BUPRENORPHINE HCL (SUBUTEX SL)    Place under the tongue       ALLERGIES     Clindamycin; Clindamycin/lincomycin; and Azithromycin    FAMILY HISTORY       Family History   Problem Relation Age of Onset    Diabetes Mother     Depression Mother     Substance Abuse Mother     High Blood Pressure Maternal Grandmother     Mental Illness Father     Substance Abuse Brother           SOCIAL HISTORY       Social History     Socioeconomic History    Marital status: Single     Spouse name: None    Number of children: None    Years of education: None    Highest education level: None   Occupational History    Occupation: unemployed   Social Needs    Financial resource strain: None    Food insecurity     Worry: None     Inability: None    Transportation needs     Medical: None     Non-medical: None   Tobacco Use    Smoking status: Current Every Day Smoker     Packs/day: 1.00     Years: 6.00     Pack years: 6.00     Types: Cigarettes    Smokeless tobacco: Never Used   Substance and Sexual Activity    Alcohol use: No     Comment: social    Drug use: Yes     Frequency: 7.0 times per week     Types: Marijuana, IV, Opiates      Comment: on methadone but states he slips up, admits to using heroin    Sexual activity: None   Lifestyle    Physical activity     Days per week: None     Minutes per session: None    Stress: None   Relationships    Social connections     Talks on phone: None     Gets together: None     Attends [employer] service: None     Active member of club or organization: None     Attends meetings of clubs or organizations: None     Relationship status: None    Intimate partner violence     Fear of current or ex partner: None     Emotionally abused: None     Physically abused: None     Forced sexual activity: None   Other Topics Concern    None   Social History Narrative    None       SCREENINGS             PHYSICAL EXAM       ED Triage Vitals [09/21/20 1856]   BP Temp Temp Source Pulse Resp SpO2 Height Weight   99/73 98 °F (36.7 °C) Infrared 71 15 98 % 5' 10.5\" (1.791 m) 146 lb 0.1 oz (66.2 kg)       Physical Exam  General appearance: Alert, cooperative, no distress, appears stated age. Head:  Normocephalic, without obvious abnormality, atraumatic.   HEENT: Mucous membranes moist.  Neck: Full ROM, trachea midline, no JVD  Lungs: No respiratory distress  Cardiovasular: Perfusing extremities  Abdomen: Nontender, no guarding  Extremities: Atraumatic, full about heroin or fentanyl, but does say that he would be okay with me saying the he overdosed on the alprazolam.    Patient's uncle came to the emergency department. He initially was not willing to take responsibility for the patient since the patient did not have any place to stay. Patient became very agitated, threatened to \"swing on anybody\", I tried verbal de-escalation but the patient was getting more agitated so we did call the police. Patient did eventually find a place to go and his uncle gave consent. At this point the patient is certainly not at risk for any airway compromise, he is loudly screaming and agitated at this point. Patient discharged to home. REASSESSMENT          CRITICAL CARE TIME   Critical Care time was 0 minutes, excluding separately reportable procedures. There was a high probability of clinically significant/life threatening deteriorationin the patient's condition which required my urgent intervention. CONSULTS:  None     PROCEDURES:  Unless otherwise noted below, none     Procedures    FINAL IMPRESSION      1.  Benzodiazepine overdose, accidental or unintentional, initial encounter          DISPOSITION/PLAN   DISPOSITION [place] 09/21/2020 07:07:29 PM      PATIENT REFERRED TO:  [facility]) Pre-Services  [phone]  Schedule an appointment as soon as possible for a visit   To establish primary care    [place] [address]  [phone]    If symptoms worsen      DISCHARGE MEDICATIONS:  New Prescriptions    No medications on file          (Please note that portions of this note were completed with a voicerecognition program.  Efforts were made to edit the dictations but occasionally words are mis-transcribed.)    Angela Ibarra MD (electronically signed)  Attending Emergency Physician           Angela Ibarra MD  09/21/20 2009

## 2020-09-22 NOTE — ED NOTES
Pt becoming agitated and irritated. Pt wont go back in room and out in hallway. Security with patient.      Sophie Carpenter RN  09/21/20 2011

## 2020-09-22 NOTE — ED NOTES
135 called for police support  Pt threatening to leave, being aggressive towards staff members.       Samy Wiseman, AIMEE  09/21/20 2012

## 2020-11-05 ENCOUNTER — HOSPITAL ENCOUNTER (EMERGENCY)
Age: 25
Discharge: HOME | End: 2020-11-05
Payer: MEDICAID

## 2020-11-05 VITALS
WEIGHT: 144.4 LBS | BODY MASS INDEX: 20.67 KG/M2 | SYSTOLIC BLOOD PRESSURE: 115 MMHG | TEMPERATURE: 98.6 F | DIASTOLIC BLOOD PRESSURE: 76 MMHG | RESPIRATION RATE: 22 BRPM | HEART RATE: 85 BPM | OXYGEN SATURATION: 98 % | HEIGHT: 70 IN

## 2020-11-05 PROCEDURE — 99284 EMERGENCY DEPT VISIT MOD MDM: CPT

## 2020-11-05 PROCEDURE — 6370000000 HC RX 637 (ALT 250 FOR IP): Performed by: PHYSICIAN ASSISTANT

## 2020-11-05 RX ORDER — PENICILLIN V POTASSIUM 500 MG/1
500 TABLET ORAL 4 TIMES DAILY
Qty: 28 TABLET | Refills: 0 | Status: SHIPPED | OUTPATIENT
Start: 2020-11-05 | End: 2020-11-12

## 2020-11-05 RX ORDER — ACETAMINOPHEN 325 MG/1
650 TABLET ORAL ONCE
Status: COMPLETED | OUTPATIENT
Start: 2020-11-05 | End: 2020-11-05

## 2020-11-05 RX ORDER — NAPROXEN 250 MG/1
500 TABLET ORAL ONCE
Status: COMPLETED | OUTPATIENT
Start: 2020-11-05 | End: 2020-11-05

## 2020-11-05 RX ORDER — NAPROXEN 500 MG/1
500 TABLET ORAL 2 TIMES DAILY PRN
Qty: 20 TABLET | Refills: 0 | Status: SHIPPED | OUTPATIENT
Start: 2020-11-05 | End: 2021-02-07

## 2020-11-05 RX ORDER — PENICILLIN V POTASSIUM 500 MG/1
500 TABLET ORAL ONCE
Status: COMPLETED | OUTPATIENT
Start: 2020-11-05 | End: 2020-11-05

## 2020-11-05 RX ADMIN — ACETAMINOPHEN 650 MG: 325 TABLET ORAL at 16:21

## 2020-11-05 RX ADMIN — NAPROXEN 500 MG: 250 TABLET ORAL at 16:21

## 2020-11-05 RX ADMIN — PENICILLIN V POTASIUM 500 MG: 500 TABLET OROPHARYNGEAL at 16:21

## 2020-11-05 ASSESSMENT — ENCOUNTER SYMPTOMS
NAUSEA: 0
DIARRHEA: 0
ABDOMINAL PAIN: 0
SHORTNESS OF BREATH: 0
EYE PAIN: 0
COUGH: 0
BACK PAIN: 0
VOMITING: 0

## 2020-11-05 ASSESSMENT — PAIN SCALES - GENERAL
PAINLEVEL_OUTOF10: 7

## 2020-11-05 ASSESSMENT — PAIN DESCRIPTION - DESCRIPTORS
DESCRIPTORS: ACHING;THROBBING
DESCRIPTORS: THROBBING

## 2020-11-05 ASSESSMENT — PAIN DESCRIPTION - LOCATION
LOCATION: TEETH
LOCATION: TEETH

## 2020-11-05 ASSESSMENT — PAIN DESCRIPTION - ORIENTATION: ORIENTATION: UPPER;ANTERIOR;RIGHT

## 2020-11-05 NOTE — ED PROVIDER NOTES
[address] [facility] ENCOUNTER        Pt Name: Jessy Hidalgo  MRN: [id number]  [place] 1995  Date of evaluation: 11/5/2020  Provider: CHRIS Reynoso  PCP: No primary care provider on file. MINI. I have evaluated this patient. My supervising physician was available for consultation. CHIEF COMPLAINT       Chief Complaint   Patient presents with    Dental Injury     PT chipped tooth eating candy yesterday (front top right)        HISTORY OF PRESENT ILLNESS   (Location, Timing/Onset, Context/Setting, Quality, Duration, Modifying Factors, Severity, Associated Signs and Symptoms)  Note limiting factors. Mack Uriostegui is a 31 y.o. male who presents for evaluation of dental pain. Patient broke his upper left tooth while eating yesterday. Broke down to the gumline. Reports pain at this area and pain above in the gumline. Pain rated 7/10. Sharp and aching. Does not have a dentist. Just recently got insurance. The patient denies headache, fever or chills. No recent antibiotics. No other acute concerns, associated symptoms or modifying factors. Nursing Notes were all reviewed and agreed with or any disagreements were addressed in the HPI. REVIEW OF SYSTEMS    (2-9 systems for level 4, 10 or more for level 5)     Review of Systems   Constitutional: Negative for chills, fatigue and fever. HENT: Positive for dental problem. Eyes: Negative for pain. Respiratory: Negative for cough and shortness of breath. Cardiovascular: Negative for chest pain. Gastrointestinal: Negative for abdominal pain, diarrhea, nausea and vomiting. Genitourinary: Negative for dysuria. Musculoskeletal: Negative for back pain, neck pain and neck stiffness. Skin: Negative for rash. Neurological: Negative for dizziness and headaches. Psychiatric/Behavioral: Negative for confusion. Positives and Pertinent negatives as per HPI.   Except as noted above in the ROS, all other systems were reviewed and negative. PAST MEDICAL HISTORY     Past Medical History:   Diagnosis Date    ADHD (attention deficit hyperactivity disorder)     Cyst     testicles    Depression     Drug abuse, IV ([address]     heroine    Hemorrhoids     Heroin use          SURGICAL HISTORY     Past Surgical History:   Procedure Laterality Date    NASAL SEPTUM SURGERY      TONSILLECTOMY      TYMPANOSTOMY TUBE PLACEMENT           CURRENTMEDICATIONS       Previous Medications    BUPRENORPHINE HCL (SUBUTEX SL)    Place under the tongue         ALLERGIES     Clindamycin; Clindamycin/lincomycin; and Azithromycin    FAMILYHISTORY       Family History   Problem Relation Age of Onset    Diabetes Mother     Depression Mother     Substance Abuse Mother     High Blood Pressure Maternal Grandmother     Mental Illness Father     Substance Abuse Brother           SOCIAL HISTORY       Social History     Tobacco Use    Smoking status: Current Every Day Smoker     Packs/day: 1.00     Years: 6.00     Pack years: 6.00     Types: Cigarettes    Smokeless tobacco: Never Used   Substance Use Topics    Alcohol use: No     Comment: social    Drug use: Yes     Frequency: 7.0 times per week     Types: Marijuana, IV, Opiates      Comment: on methadone but states he slips up, admits to using heroin       SCREENINGS             PHYSICAL EXAM    (up to 7 for level 4, 8 or more for level 5)     ED Triage Vitals [11/05/20 1604]   BP Temp Temp Source Pulse Resp SpO2 Height Weight   115/76 98.6 °F (37 °C) Oral 85 22 98 % 5' 10\" (1.778 m) 144 lb 6.4 oz (65.5 kg)       Physical Exam  Vitals signs and nursing note reviewed. Constitutional:       General: He is not in acute distress. Appearance: He is well-developed. He is not diaphoretic. HENT:      Head: Normocephalic and atraumatic. Comments: #6 & #5 broken down to gumline with caries. Tender along gingiva without fluctuance/induration. in time range)   acetaminophen (TYLENOL) tablet 650 mg (has no administration in time range)   penicillin v potassium (VEETID) tablet 500 mg (has no administration in time range)           Differential Diagnosis: Dental Abscess, Airway Obstruction, Donte's Angina, Bacteremia/Sepsis. Patient seen and examined today for dental pain. See HPI for patient presentation. Patient is in no acute distress, nontoxic, afebrile with unremarkable vital signs. Broken painful tooth w/ cavity noted. No other internal oral masses and NO sublingual edema or evidence of airway impairment. Very low suspicion for a deep space infection like Donte's angina or retropharyngeal abscess. There is no evidence of airway compromise. At this time I believe patient's presentation does not warrant further workup with labs or imaging in the emergency department and is stable for discharge home. Will try to set patient up with Lucas Jorge appt tomorrow Friday at 9 AM  Will also provide with list of dental clinics in case this appt falls through. I instructed the patient to take the medications listed below as prescribed, and to follow up as an outpatient with a dentist.  We also discussed returning to the Emergency Department immediately if new or worsening symptoms occur. We have discussed the symptoms which are most concerning (e.g., changing or worsening pain, trouble swallowing or breathing, neck stiffness or fever) that necessitate immediate return. FINAL IMPRESSION      1. Dental decay    2. Pain, dental          DISPOSITION/PLAN   DISPOSITION Discharge - Pending Orders Complete 11/05/2020 04:10:29 PM      PATIENT REFERREDTO:    call and follow up with Lucas Jorge at 9 am tomorrow.  please see attached list of dental clinics for close follow up if this appointment falls through        [address]  [place] [address] [phone]    If symptoms worsen      DISCHARGE MEDICATIONS:  New Prescriptions    MAGIC MOUTHWASH (MIRACLE MOUTHWASH)    Swish and spit 5 mLs 4 times daily as needed for Dental Pain Equal parts 2% lidocaine, dyphenhydramine, antacid.     NAPROXEN (NAPROSYN) 500 MG TABLET    Take 1 tablet by mouth 2 times daily as needed for Pain    PENICILLIN V POTASSIUM (VEETID) 500 MG TABLET    Take 1 tablet by mouth 4 times daily for 7 days       DISCONTINUED MEDICATIONS:  Discontinued Medications    No medications on file              (Please note that portions of this note were completed with a voice recognition program.  Efforts were made to edit the dictations but occasionally words are mis-transcribed.)    CHRIS Valentino (electronically signed)           CHRIS Valentino  11/05/20 0047

## 2020-12-07 ENCOUNTER — HOSPITAL ENCOUNTER (EMERGENCY)
Age: 25
Discharge: HOME | End: 2020-12-07
Attending: EMERGENCY MEDICINE
Payer: MEDICAID

## 2020-12-07 VITALS
HEART RATE: 72 BPM | SYSTOLIC BLOOD PRESSURE: 108 MMHG | HEIGHT: 70 IN | WEIGHT: 151.46 LBS | DIASTOLIC BLOOD PRESSURE: 52 MMHG | BODY MASS INDEX: 21.68 KG/M2 | OXYGEN SATURATION: 100 % | RESPIRATION RATE: 18 BRPM | TEMPERATURE: 98.1 F

## 2020-12-07 PROCEDURE — 99283 EMERGENCY DEPT VISIT LOW MDM: CPT

## 2020-12-07 RX ORDER — SULFAMETHOXAZOLE AND TRIMETHOPRIM 800; 160 MG/1; MG/1
1 TABLET ORAL 2 TIMES DAILY
Qty: 20 TABLET | Refills: 0 | Status: SHIPPED | OUTPATIENT
Start: 2020-12-07 | End: 2020-12-17

## 2020-12-07 RX ORDER — CEPHALEXIN 500 MG/1
500 CAPSULE ORAL 4 TIMES DAILY
Qty: 40 CAPSULE | Refills: 0 | Status: SHIPPED | OUTPATIENT
Start: 2020-12-07 | End: 2020-12-17

## 2020-12-07 ASSESSMENT — PAIN DESCRIPTION - FREQUENCY: FREQUENCY: CONTINUOUS

## 2020-12-07 ASSESSMENT — PAIN DESCRIPTION - LOCATION: LOCATION: TEETH

## 2020-12-07 ASSESSMENT — PAIN SCALES - GENERAL
PAINLEVEL_OUTOF10: 5
PAINLEVEL_OUTOF10: 5

## 2020-12-07 ASSESSMENT — PAIN DESCRIPTION - ORIENTATION: ORIENTATION: RIGHT;LOWER

## 2020-12-07 ASSESSMENT — PAIN DESCRIPTION - DESCRIPTORS: DESCRIPTORS: THROBBING

## 2020-12-07 NOTE — ED PROVIDER NOTES
[facility] ENCOUNTER      Pt Name: Junior Stiles  MRN: [id number]  [place] 1995  Date of evaluation: 12/7/2020  Provider: Eh Clemons, [address] [place] [address]       Chief Complaint   Patient presents with    Dental Pain     lower right jaw. States he has a hand infection also from IV drug use. HISTORY OF PRESENT ILLNESS   (Location/Symptom, Timing/Onset, Context/Setting, Quality, Duration, Modifying Factors, Severity)  Note limiting factors. Mack Hoang is a 31 y.o. male who presents to the emergency department with a complaint of dental pain. The patient states that yesterday he began having pain and swelling in the right lower mandibular area. He has had similar problems in the past and reports multiple decayed teeth. He denies any fever, chills, nausea vomiting or diarrhea. He denies any loss of taste or smell. No earache or sore throat. He denies any chest pain or shortness of breath. No dyspnea on exertion. No exposure to COVID-19. He does not currently have a dentist.    He also reports history of daily IV drug use. He denies any usage today. He typically injects in his left hand and 2 days ago noticed some mild swelling and tenderness to the dorsal aspect of the left hand. He reports a similar infection in the past that was treated with oral antibiotics and it went away remotely in the past.  He denies any forearm or upper arm pain. He denies any prior history of thromboembolic disease. He denies any cough or cold symptoms. No nausea vomiting or diarrhea. Nursing Notes were reviewed. HPI        REVIEW OF SYSTEMS    (2-9 systems for level 4, 10 or more for level 5)       Constitutional: Negative for fever or chills. HENT: Negative for rhinorrhea and sore throat. Eyes: Negative for redness or drainage. Respiratory: Negative for shortness of breath or dyspnea on exertion.     Cardiovascular: Negative for chest pain.   Gastrointestinal: Negative for abdominal pain. Negative for vomiting or diarrhea. Genitourinary: Negative for flank pain. Negative for dysuria. Negative for hematuria. Neurological: Negative for headache. Musculoskeletal:  Negative edema. All systems are reviewed and are negative except for those listed above in the history of present illness and ROS.         PAST MEDICAL HISTORY     Past Medical History:   Diagnosis Date    ADHD (attention deficit hyperactivity disorder)     Cyst     testicles    Depression     Drug abuse, IV ([address]     heroine    Hemorrhoids     Hepatitis C     Heroin use          SURGICAL HISTORY       Past Surgical History:   Procedure Laterality Date    NASAL SEPTUM SURGERY      TONSILLECTOMY      TYMPANOSTOMY TUBE PLACEMENT           CURRENT MEDICATIONS       Previous Medications    NAPROXEN (NAPROSYN) 500 MG TABLET    Take 1 tablet by mouth 2 times daily as needed for Pain       ALLERGIES     Clindamycin; Clindamycin/lincomycin; and Azithromycin    FAMILY HISTORY       Family History   Problem Relation Age of Onset    Diabetes Mother     Depression Mother     Substance Abuse Mother     High Blood Pressure Maternal Grandmother     Mental Illness Father     Substance Abuse Brother           SOCIAL HISTORY       Social History     Socioeconomic History    Marital status: Single     Spouse name: None    Number of children: None    Years of education: None    Highest education level: None   Occupational History    Occupation: unemployed   Social Needs    Financial resource strain: None    Food insecurity     Worry: None     Inability: None    Transportation needs     Medical: None     Non-medical: None   Tobacco Use    Smoking status: Current Every Day Smoker     Packs/day: 1.00     Years: 6.00     Pack years: 6.00     Types: Cigarettes    Smokeless tobacco: Current User     Types: Chew   Substance and Sexual Activity    Alcohol use: No     Comment: social    Drug use: Yes     Frequency: 7.0 times per week     Types: Marijuana, IV, Opiates      Comment: on methadone but states he slips up, admits to using heroin    Sexual activity: Yes   Lifestyle    Physical activity     Days per week: None     Minutes per session: None    Stress: None   Relationships    Social connections     Talks on phone: None     Gets together: None     Attends Hindu service: None     Active member of club or organization: None     Attends meetings of clubs or organizations: None     Relationship status: None    Intimate partner violence     Fear of current or ex partner: None     Emotionally abused: None     Physically abused: None     Forced sexual activity: None   Other Topics Concern    None   Social History Narrative    None       SCREENINGS             PHYSICAL EXAM    (up to 7 for level 4, 8 or more for level 5)     ED Triage Vitals [12/07/20 1326]   BP Temp Temp Source Pulse Resp SpO2 Height Weight   (!) 106/47 96.6 °F (35.9 °C) Oral 61 18 100 % 5' 10\" (1.778 m) 151 lb 7.3 oz (68.7 kg)         Physical Exam   Constitutional: Awake and alert. Nontoxic. Not ill-appearing. Very pleasant. Head: No visible evidence of trauma. Normocephalic. Eyes: Pupils equal and reactive. No photophobia. Conjunctiva normal.    HENT: Oral mucosa moist.  Airway patent. Pharynx without erythema. Nares were clear. Multiple areas of dental decay were noted. The mandibular third molar was fractured secondary to decay and eroded down to the gingival surface. There was gingival soft tissue swelling surrounding this area but no evidence of abscess. Full range of motion noted in the mandible. Uvula midline. No stridor or drooling. Speech was normal.  Floor of the mouth was normal.  Anterior neck was nontender. Neck:  Soft and supple. Nontender. Heart:  Regular rate and rhythm. No murmur. Lungs:  Clear to auscultation. No wheezes, rales, or ronchi.   No conversational dyspnea (!) 106/47   Pulse: 61   Resp: 18   Temp: 96.6 °F (35.9 °C)   TempSrc: Oral   SpO2: 100%   Weight: 151 lb 7.3 oz (68.7 kg)   Height: 5' 10\" (1.778 m)         MDM      Patient presents with dental pain as noted above. No evidence of abscess or airway compromise. He is afebrile. He is hemodynamically stable. He is stable for discharge and outpatient management. The  arranged for him to see Lucas schofield tomorrow at 8 AM.  He was advised to keep this appointment. He will be placed on Bactrim and Keflex to cover both his dental pain and also the left hand. I suspect that he likely has a superficial thrombophlebitis of the left hand but I cannot exclude the possibility of an early developing cellulitis. No clear evidence of cellulitis at this time. He was given a prescription for Bactrim and Keflex. Advised him to watch for increased pain redness swelling loss of function numbness fever or chills. If his condition worsens or new symptoms develop, he was advised to return immediately to the emergency department. I advised him to follow-up with a primary care physician in 1 to 2 days for reexamination of the left hand. In addition, advised to discontinue IV drug use. REASSESSMENT              CRITICAL CARE TIME   Total Critical Care time was 0 minutes, excluding separately reportable procedures. There was a high probability of clinically significant/life threatening deterioration in the patient's condition which required my urgent intervention. CONSULTS:  None    PROCEDURES:  Unless otherwise noted below, none     Procedures        FINAL IMPRESSION      1. Odontalgia    2.  Superficial thrombophlebitis of left upper extremity          DISPOSITION/PLAN   DISPOSITION Decision To Discharge 12/07/2020 02:12:46 PM      PATIENT REFERRED TO:  [facility]) Referral  Call [phone] for an appointment  Call today        DISCHARGE MEDICATIONS:  New Prescriptions    CEPHALEXIN (KEFLEX) 500 MG CAPSULE    Take 1 capsule by mouth 4 times daily for 10 days    SULFAMETHOXAZOLE-TRIMETHOPRIM (BACTRIM DS) 800-160 MG PER TABLET    Take 1 tablet by mouth 2 times daily for 10 days     Controlled Substances Monitoring:     No flowsheet data found. (Please note that portions of this note were completed with a voice recognition program.  Efforts were made to edit the dictations but occasionally words are mis-transcribed. )    [address] Santana Brooke DO (electronically signed)  Attending Emergency Physician          Will Madden DO  12/07/20 7026

## 2020-12-07 NOTE — CARE COORDINATION
ED Advocate met with patient and discussed the importance of a PCP. ED Advocate discussed [facility] PCP's and Patient is agreeable to see Cieralisa Nam in the [facility]. ED Advocate call [facility] Referral line and set up appointment for patient on 1/18/21 @2pm.  ED Advocate set up appointment for patient for tomorrow 12/8/20 @1:30pm with Lucas schofield. ED Advocate provided Addiction Treatment resources to patient, he reports that he is waiting to get in [address] [place] [address], ED Advocate offered to call [facility] and assist and patient declined. Patient stated that he currently doesn't have an ID- discussed with him, that you can get an social security card and birth certificate online, provided [place] [address] resource information for assistance in getting ID.

## 2021-02-07 ENCOUNTER — HOSPITAL ENCOUNTER (EMERGENCY)
Age: 26
Discharge: HOME | End: 2021-02-07
Attending: EMERGENCY MEDICINE
Payer: MEDICAID

## 2021-02-07 VITALS
BODY MASS INDEX: 21.76 KG/M2 | OXYGEN SATURATION: 98 % | RESPIRATION RATE: 14 BRPM | HEART RATE: 74 BPM | WEIGHT: 151.68 LBS | SYSTOLIC BLOOD PRESSURE: 131 MMHG | TEMPERATURE: 97.9 F | DIASTOLIC BLOOD PRESSURE: 84 MMHG

## 2021-02-07 DIAGNOSIS — S01.511A LIP LACERATION, INITIAL ENCOUNTER: Primary | ICD-10-CM
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
## 2021-02-07 DIAGNOSIS — F17.200 SMOKER: ICD-10-CM

## 2021-02-07 DIAGNOSIS — K08.9 POOR DENTITION: ICD-10-CM

## 2021-02-07 DIAGNOSIS — S02.5XXA CLOSED FRACTURE OF TOOTH, INITIAL ENCOUNTER: ICD-10-CM

## 2021-02-07 PROCEDURE — 6370000000 HC RX 637 (ALT 250 FOR IP): Performed by: EMERGENCY MEDICINE

## 2021-02-07 PROCEDURE — 99283 EMERGENCY DEPT VISIT LOW MDM: CPT

## 2021-02-07 RX ORDER — NAPROXEN 250 MG/1
500 TABLET ORAL ONCE
Status: COMPLETED | OUTPATIENT
Start: 2021-02-07 | End: 2021-02-07

## 2021-02-07 RX ORDER — LIDOCAINE HYDROCHLORIDE 20 MG/ML
15 SOLUTION OROPHARYNGEAL ONCE
Status: COMPLETED | OUTPATIENT
Start: 2021-02-07 | End: 2021-02-07

## 2021-02-07 RX ORDER — NAPROXEN 500 MG/1
500 TABLET ORAL 2 TIMES DAILY PRN
Qty: 20 TABLET | Refills: 0 | Status: SHIPPED | OUTPATIENT
Start: 2021-02-07 | End: 2021-03-04 | Stop reason: ALTCHOICE

## 2021-02-07 RX ADMIN — LIDOCAINE HYDROCHLORIDE 15 ML: 20 SOLUTION ORAL; TOPICAL at 02:57

## 2021-02-07 RX ADMIN — NAPROXEN 500 MG: 250 TABLET ORAL at 02:57

## 2021-02-07 ASSESSMENT — PAIN - FUNCTIONAL ASSESSMENT: PAIN_FUNCTIONAL_ASSESSMENT: 0-10

## 2021-02-07 ASSESSMENT — PAIN DESCRIPTION - LOCATION: LOCATION: MOUTH

## 2021-02-07 ASSESSMENT — PAIN DESCRIPTION - PAIN TYPE: TYPE: ACUTE PAIN
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
## 2021-03-04 ENCOUNTER — APPOINTMENT (OUTPATIENT)
Dept: GENERAL RADIOLOGY | Age: 26
End: 2021-03-04
Payer: MEDICAID

## 2021-03-04 ENCOUNTER — HOSPITAL ENCOUNTER (EMERGENCY)
Age: 26
Discharge: HOME | End: 2021-03-04
Attending: EMERGENCY MEDICINE
Payer: MEDICAID

## 2021-03-04 VITALS
HEART RATE: 109 BPM | SYSTOLIC BLOOD PRESSURE: 136 MMHG | OXYGEN SATURATION: 98 % | WEIGHT: 148 LBS | HEIGHT: 69 IN | DIASTOLIC BLOOD PRESSURE: 97 MMHG | TEMPERATURE: 99.9 F | RESPIRATION RATE: 18 BRPM | BODY MASS INDEX: 21.92 KG/M2

## 2021-03-04 DIAGNOSIS — S93.602A SPRAIN OF LEFT FOOT, INITIAL ENCOUNTER: ICD-10-CM

## 2021-03-04 DIAGNOSIS — S93.402A SPRAIN OF LEFT ANKLE, UNSPECIFIED LIGAMENT, INITIAL ENCOUNTER: Primary | ICD-10-CM

## 2021-03-04 PROCEDURE — 6370000000 HC RX 637 (ALT 250 FOR IP): Performed by: EMERGENCY MEDICINE

## 2021-03-04 PROCEDURE — 99283 EMERGENCY DEPT VISIT LOW MDM: CPT

## 2021-03-04 PROCEDURE — 73610 X-RAY EXAM OF ANKLE: CPT

## 2021-03-04 PROCEDURE — 73630 X-RAY EXAM OF FOOT: CPT

## 2021-03-04 RX ORDER — ACETAMINOPHEN 500 MG
1000 TABLET ORAL ONCE
Status: COMPLETED | OUTPATIENT
Start: 2021-03-04 | End: 2021-03-04

## 2021-03-04 RX ORDER — IBUPROFEN 800 MG/1
800 TABLET ORAL 3 TIMES DAILY PRN
Qty: 15 TABLET | Refills: 0 | Status: SHIPPED | OUTPATIENT
Start: 2021-03-04 | End: 2021-08-27

## 2021-03-04 RX ADMIN — ACETAMINOPHEN 1000 MG: 500 TABLET ORAL at 01:36

## 2021-03-04 ASSESSMENT — PAIN DESCRIPTION - ONSET: ONSET: SUDDEN

## 2021-03-04 ASSESSMENT — PAIN DESCRIPTION - PAIN TYPE: TYPE: ACUTE PAIN

## 2021-03-04 ASSESSMENT — PAIN DESCRIPTION - DESCRIPTORS: DESCRIPTORS: BURNING;THROBBING

## 2021-03-04 ASSESSMENT — PAIN DESCRIPTION - LOCATION: LOCATION: ANKLE

## 2021-03-04 NOTE — ED NOTES
Discharge instructions reviewed with pt and pt denied having any questions. Discharge paperwork signed and pt discharged.         Nereida White RN  03/04/21 7137
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
## 2021-03-20 ENCOUNTER — HOSPITAL ENCOUNTER (EMERGENCY)
Age: 26
Discharge: HOME | End: 2021-03-20
Attending: EMERGENCY MEDICINE
Payer: MEDICAID

## 2021-03-20 VITALS
HEIGHT: 70 IN | RESPIRATION RATE: 18 BRPM | TEMPERATURE: 98.1 F | OXYGEN SATURATION: 98 % | WEIGHT: 142.86 LBS | SYSTOLIC BLOOD PRESSURE: 135 MMHG | DIASTOLIC BLOOD PRESSURE: 78 MMHG | BODY MASS INDEX: 20.45 KG/M2 | HEART RATE: 69 BPM

## 2021-03-20 DIAGNOSIS — M79.604 RIGHT LEG PAIN: Primary | ICD-10-CM

## 2021-03-20 LAB
ANION GAP SERPL CALCULATED.3IONS-SCNC: 12 MMOL/L (ref 3–16)
APTT: 34.9 SEC (ref 24.2–36.2)
BASOPHILS ABSOLUTE: 0 K/UL (ref 0–0.2)
BASOPHILS RELATIVE PERCENT: 0.5 %
BUN BLDV-MCNC: 14 MG/DL (ref 7–20)
CALCIUM SERPL-MCNC: 10 MG/DL (ref 8.3–10.6)
CHLORIDE BLD-SCNC: 100 MMOL/L (ref 99–110)
CO2: 26 MMOL/L (ref 21–32)
CREAT SERPL-MCNC: 0.8 MG/DL (ref 0.9–1.3)
D DIMER: 250 NG/ML DDU (ref 0–229)
EOSINOPHILS ABSOLUTE: 0.1 K/UL (ref 0–0.6)
EOSINOPHILS RELATIVE PERCENT: 0.9 %
GFR AFRICAN AMERICAN: >60
GFR NON-AFRICAN AMERICAN: >60
GLUCOSE BLD-MCNC: 96 MG/DL (ref 70–99)
HCT VFR BLD CALC: 38.7 % (ref 40.5–52.5)
HEMOGLOBIN: 12.9 G/DL (ref 13.5–17.5)
INR BLD: 1.2 (ref 0.86–1.14)
LYMPHOCYTES ABSOLUTE: 1.7 K/UL (ref 1–5.1)
LYMPHOCYTES RELATIVE PERCENT: 23.5 %
MCH RBC QN AUTO: 27.6 PG (ref 26–34)
MCHC RBC AUTO-ENTMCNC: 33.3 G/DL (ref 31–36)
MCV RBC AUTO: 82.9 FL (ref 80–100)
MONOCYTES ABSOLUTE: 0.6 K/UL (ref 0–1.3)
MONOCYTES RELATIVE PERCENT: 8.2 %
NEUTROPHILS ABSOLUTE: 4.7 K/UL (ref 1.7–7.7)
NEUTROPHILS RELATIVE PERCENT: 66.9 %
PDW BLD-RTO: 14.2 % (ref 12.4–15.4)
PLATELET # BLD: 262 K/UL (ref 135–450)
PMV BLD AUTO: 7.5 FL (ref 5–10.5)
POTASSIUM SERPL-SCNC: 4.1 MMOL/L (ref 3.5–5.1)
PROTHROMBIN TIME: 13.8 SEC (ref 10–13.2)
RBC # BLD: 4.66 M/UL (ref 4.2–5.9)
SODIUM BLD-SCNC: 138 MMOL/L (ref 136–145)
WBC # BLD: 7.1 K/UL (ref 4–11)

## 2021-03-20 PROCEDURE — 85610 PROTHROMBIN TIME: CPT

## 2021-03-20 PROCEDURE — 80048 BASIC METABOLIC PNL TOTAL CA: CPT

## 2021-03-20 PROCEDURE — 99284 EMERGENCY DEPT VISIT MOD MDM: CPT

## 2021-03-20 PROCEDURE — 85379 FIBRIN DEGRADATION QUANT: CPT

## 2021-03-20 PROCEDURE — 85025 COMPLETE CBC W/AUTO DIFF WBC: CPT

## 2021-03-20 PROCEDURE — 36415 COLL VENOUS BLD VENIPUNCTURE: CPT

## 2021-03-20 PROCEDURE — 85730 THROMBOPLASTIN TIME PARTIAL: CPT

## 2021-03-20 ASSESSMENT — PAIN DESCRIPTION - FREQUENCY
FREQUENCY: CONTINUOUS
FREQUENCY: CONTINUOUS

## 2021-03-20 ASSESSMENT — PAIN DESCRIPTION - LOCATION: LOCATION: LEG

## 2021-03-20 ASSESSMENT — PAIN DESCRIPTION - DESCRIPTORS: DESCRIPTORS: ACHING

## 2021-03-20 ASSESSMENT — PAIN DESCRIPTION - ORIENTATION: ORIENTATION: RIGHT

## 2021-03-20 ASSESSMENT — PAIN SCALES - GENERAL: PAINLEVEL_OUTOF10: 5

## 2021-03-20 ASSESSMENT — PAIN DESCRIPTION - PAIN TYPE: TYPE: ACUTE PAIN

## 2021-03-20 NOTE — ED NOTES
Report received from Herrera Mcdonald [facility]. Pt laying in bed with no s/s discomfort or distress at this time.       Kayleen Adamson RN  03/20/21 8886

## 2021-03-20 NOTE — ED PROVIDER NOTES
Point of care limited DVT exam  Procedure note:  Indication: leg pain, leg swelling  Billable study: yes    Views:  Right saphenofemoral junction: Adequate  Right common femoral vein: Adequate  Right popliteal vein: Adequate  Right popliteal trifurcation: Adequate    Images obtained with findings:   Right saphenofemoral junction compressible: yes  Right common femoral vein compressible: yes  Right popliteal vein compressible: yes  Right popliteal trifurcation compressible: yes    Impression:   Sonographic evidence of deep venous thrombosis: Absent  right     Images obtained by myself, interpreted by myself. Images were saved to ultrasound machine.            Beau Griffith MD  03/20/21 5442

## 2021-03-20 NOTE — ED NOTES
Pt educated on discharge instructions, he verbalized understanding. Pt ambulated out of the department, no s/s discomfort or distress.       Shira Perera RN  03/20/21 0433

## 2021-03-20 NOTE — ED PROVIDER NOTES
Opiates      Comment: fentynal    Sexual activity: Yes   Lifestyle    Physical activity     Days per week: Not on file     Minutes per session: Not on file    Stress: Not on file   Relationships    Social connections     Talks on phone: Not on file     Gets together: Not on file     Attends Shinto service: Not on file     Active member of club or organization: Not on file     Attends meetings of clubs or organizations: Not on file     Relationship status: Not on file    Intimate partner violence     Fear of current or ex partner: Not on file     Emotionally abused: Not on file     Physically abused: Not on file     Forced sexual activity: Not on file   Other Topics Concern    Not on file   Social History Narrative    Not on file     No current facility-administered medications for this encounter. Current Outpatient Medications   Medication Sig Dispense Refill    ibuprofen (ADVIL;MOTRIN) 800 MG tablet Take 1 tablet by mouth 3 times daily as needed for Pain 15 tablet 0    Magic Mouthwash (MIRACLE MOUTHWASH) Swish and spit 10 mLs 4 times daily as needed for Irritation (throat pain) 1 part 2% viscous lidocaine, 1 part diphenhydramine, 1 part Maalox 240 mL 0     Allergies   Allergen Reactions    Clindamycin     Clindamycin/Lincomycin Itching    Azithromycin Itching       [unfilled]    Nursing Notes Reviewed    Physical Exam:  Vitals:    03/20/21 0617   BP: (!) 142/86   Pulse: 76   Resp: 18   Temp: 98.1 °F (36.7 °C)   SpO2: 98%       GENERAL APPEARANCE: Awake and alert. Cooperative. No acute distress. HEAD: Normocephalic. Atraumatic. EYES: EOM's grossly intact. Sclera anicteric. ENT: Mucous membranes are moist. Tolerates saliva. No trismus. NECK: Supple. No meningismus. Trachea midline. HEART: RRR. Radial pulses 2+. LUNGS: Respirations unlabored. CTAB  ABDOMEN: Soft. Non-tender. No guarding or rebound. EXTREMITIES: No acute deformities.   Examination of the bilateral lower extremities shows symmetric calves with no overlying cellulitis, appropriate distal pulses, soft compartments, appropriate capillary refill  SKIN: Warm and dry. NEUROLOGICAL: No gross facial drooping. Moves all 4 extremities spontaneously. PSYCHIATRIC: Normal mood.     I have reviewed and interpreted all of the currently available lab results from this visit (if applicable):  Results for orders placed or performed during the hospital encounter of 03/20/21   D-Dimer, Quantitative   Result Value Ref Range    D-Dimer, Quant 250 (H) 0 - 229 ng/mL DDU   CBC Auto Differential   Result Value Ref Range    WBC 7.1 4.0 - 11.0 K/uL    RBC 4.66 4.20 - 5.90 M/uL    Hemoglobin 12.9 (L) 13.5 - 17.5 g/dL    Hematocrit 38.7 (L) 40.5 - 52.5 %    MCV 82.9 80.0 - 100.0 fL    MCH 27.6 26.0 - 34.0 pg    MCHC 33.3 31.0 - 36.0 g/dL    RDW 14.2 12.4 - 15.4 %    Platelets 210 492 - 843 K/uL    MPV 7.5 5.0 - 10.5 fL    Neutrophils % 66.9 %    Lymphocytes % 23.5 %    Monocytes % 8.2 %    Eosinophils % 0.9 %    Basophils % 0.5 %    Neutrophils Absolute 4.7 1.7 - 7.7 K/uL    Lymphocytes Absolute 1.7 1.0 - 5.1 K/uL    Monocytes Absolute 0.6 0.0 - 1.3 K/uL    Eosinophils Absolute 0.1 0.0 - 0.6 K/uL    Basophils Absolute 0.0 0.0 - 0.2 K/uL   Basic Metabolic Panel   Result Value Ref Range    Sodium 138 136 - 145 mmol/L    Potassium 4.1 3.5 - 5.1 mmol/L    Chloride 100 99 - 110 mmol/L    CO2 26 21 - 32 mmol/L    Anion Gap 12 3 - 16    Glucose 96 70 - 99 mg/dL    BUN 14 7 - 20 mg/dL    CREATININE 0.8 (L) 0.9 - 1.3 mg/dL    GFR Non-African American >60 >60    GFR African American >60 >60    Calcium 10.0 8.3 - 10.6 mg/dL   Protime-INR   Result Value Ref Range    Protime 13.8 (H) 10.0 - 13.2 sec    INR 1.20 (H) 0.86 - 1.14   APTT   Result Value Ref Range    aPTT 34.9 24.2 - 36.2 sec        Radiographs (if obtained):  [] The following radiograph was interpreted by myself in the absence of a radiologist:  [x] Radiologist's Report Reviewed:    See Dr. Joseph Osorio note    EKG (if obtained): (All EKG's are interpreted by myself in the absence of a cardiologist)  Initial EKG on my interpretation shows *n/a    MDM:  Differential diagnosis: DVT, cellulitis, fracture    Labs show no acute abnormality. D-dimer is elevated however ultrasound was conducted by Dr. Therese Ardon which shows no evidence of a DVT. Please see his documentation. At this time I see no evidence of an acute or emergent process that would preclude his ability to be discharged and follow-up on outpatient basis. I have also given him resources for [facility] to further discuss options regarding his substance abuse. Discussed results, diagnosis and plan with patient and/or family. Questions addressed. Disposition and follow-up agreed upon. Specific discharge instructions explained. The patient and/or family and I have discussed the diagnosis and risks, and we agree with discharging home to follow-up with their primary care, specialist or referral doctor. In the event that medications were prescribed the risk profile of these medications were detailed expressly. We also discussed returning to the Emergency Department immediately if new or worsening symptoms occur. We have discussed the symptoms which are most concerning that necessitate immediate return. Old records reviewed. Labs and imaging reviewed and results discussed with patient. .        Patient was given scripts for the following medications. I counseled patient how to take these medications. New Prescriptions    No medications on file         CRITICAL CARE TIME   Total Critical Care time was 0 minutes, excluding separately reportable procedures. There was a high probability of clinically significant/life threatening deterioration in the patient's condition which required my urgent intervention. Clinical Impression:  1.  Right leg pain       (Please note that portions of this note may have been completed with a voice recognition program. Efforts were made to edit the dictations but occasionally words are mis-transcribed.)    MD Loretta Hernandez MD  03/20/21 4786

## 2021-06-12 ENCOUNTER — HOSPITAL ENCOUNTER (EMERGENCY)
Age: 26
Discharge: HOME | End: 2021-06-12
Attending: STUDENT IN AN ORGANIZED HEALTH CARE EDUCATION/TRAINING PROGRAM
Payer: MEDICAID

## 2021-06-12 VITALS
SYSTOLIC BLOOD PRESSURE: 111 MMHG | HEART RATE: 78 BPM | TEMPERATURE: 98.1 F | WEIGHT: 144.18 LBS | RESPIRATION RATE: 14 BRPM | HEIGHT: 70 IN | OXYGEN SATURATION: 96 % | BODY MASS INDEX: 20.64 KG/M2 | DIASTOLIC BLOOD PRESSURE: 61 MMHG

## 2021-06-12 DIAGNOSIS — L23.7 POISON IVY DERMATITIS: Primary | ICD-10-CM

## 2021-06-12 PROCEDURE — 99284 EMERGENCY DEPT VISIT MOD MDM: CPT

## 2021-06-12 PROCEDURE — 6370000000 HC RX 637 (ALT 250 FOR IP): Performed by: STUDENT IN AN ORGANIZED HEALTH CARE EDUCATION/TRAINING PROGRAM

## 2021-06-12 RX ORDER — DIPHENHYDRAMINE HCL 25 MG
25 CAPSULE ORAL EVERY 4 HOURS PRN
Qty: 30 CAPSULE | Refills: 0 | Status: SHIPPED | OUTPATIENT
Start: 2021-06-12 | End: 2021-06-22

## 2021-06-12 RX ORDER — DIPHENHYDRAMINE HCL 25 MG
25 TABLET ORAL ONCE
Status: COMPLETED | OUTPATIENT
Start: 2021-06-12 | End: 2021-06-12

## 2021-06-12 RX ORDER — PREDNISONE 50 MG/1
50 TABLET ORAL DAILY
Qty: 5 TABLET | Refills: 0 | Status: SHIPPED | OUTPATIENT
Start: 2021-06-12 | End: 2021-06-17

## 2021-06-12 RX ORDER — TRIAMCINOLONE ACETONIDE 1 MG/G
CREAM TOPICAL
Qty: 1 TUBE | Refills: 0 | Status: SHIPPED | OUTPATIENT
Start: 2021-06-12 | End: 2021-07-04

## 2021-06-12 RX ORDER — PREDNISONE 20 MG/1
40 TABLET ORAL ONCE
Status: COMPLETED | OUTPATIENT
Start: 2021-06-12 | End: 2021-06-12

## 2021-06-12 RX ADMIN — PREDNISONE 40 MG: 20 TABLET ORAL at 11:31

## 2021-06-12 RX ADMIN — DIPHENHYDRAMINE HCL 25 MG: 25 TABLET ORAL at 11:31

## 2021-06-12 NOTE — ED PROVIDER NOTES
Physical Activity:     Days of Exercise per Week:     Minutes of Exercise per Session:    Stress:     Feeling of Stress :    Social Connections:     Frequency of Communication with Friends and Family:     Frequency of Social Gatherings with Friends and Family:     Attends Druze Services:     Active Member of Clubs or Organizations:     Attends Club or Organization Meetings:     Marital Status:    Intimate Partner Violence:     Fear of Current or Ex-Partner:     Emotionally Abused:     Physically Abused:     Sexually Abused:        SCREENINGS             PHYSICAL EXAM    (up to 7 for level 4, 8 or more for level 5)     ED Triage Vitals [21 1103]   BP Temp Temp Source Pulse Resp SpO2 Height Weight   111/61 98.1 °F (36.7 °C) Oral 78 14 96 % 5' 10\" (1.778 m) 144 lb 2.9 oz (65.4 kg)       General: Alert and oriented appropriately for age, No acute distress. Eye: Normal conjunctiva. Pupils equal and reactive. HENT: Oral mucosa is moist.  Respiratory: Respirations even and non-labored. Cardiovascular: Normal rate, Regular rhythm. Gastrointestinal: Soft, Non-tender, Non-distended. Musculoskeletal: No swelling. Integumentary: Warm, Dry. Eczematous rash on slightly erythematous base overlying the bilateral upper extremities, bilateral groin, trunk, thighs bilaterally. Track marks on bilateral forearms. No evidence of abscess or cellulitic erythematous change. Neurologic: Alert and appropriate for age. No focal deficits. Psychiatric: Cooperative.     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  No change in his visual acuity, no fevers. Patient does incidentally use IV drugs, counseled against doing this, patient voiced understanding, on full skin examination, no evidence of any abscess. Rash largely consistent with contact dermatitis, given body surface area involved as it is over his face, bilateral upper extremities, bilateral lower extremities, groin, trunk, starting on systemic steroids prednisone and Benadryl for symptomatic relief. Given patient outpatient follow-up, return precautions for bacterial superinfection, patient voiced understanding, stable for and amenable to discharge home. Rx prednisone, Benadryl, triamcinolone cream. Given d/c instructions and return precautions, pt voices understanding. D/c home, ambulated steadily from the ED. Medications   predniSONE (DELTASONE) tablet 40 mg (40 mg Oral Given 6/12/21 1131)   diphenhydrAMINE (BENADRYL) tablet 25 mg (25 mg Oral Given 6/12/21 1131)              FINAL IMPRESSION      1. Poison ivy dermatitis          DISPOSITION/PLAN   DISPOSITION Discharge - Pending Orders Complete 06/12/2021 11:21:30 AM      PATIENT REFERRED TO:  Jaylin PRASAD [address]  [facility]  [phone]    If symptoms worsen    [facility] Pre-Services  [phone]          DISCHARGE MEDICATIONS:  New Prescriptions    DIPHENHYDRAMINE (BENADRYL) 25 MG CAPSULE    Take 1 capsule by mouth every 4 hours as needed for Itching    PREDNISONE (DELTASONE) 50 MG TABLET    Take 1 tablet by mouth daily for 5 days    TRIAMCINOLONE (KENALOG) 0.1 % CREAM    Apply topically 2 times daily for 1 week.           (Please note that portions of this note were completed with a voice recognition program.Efforts were made to edit the dictations but occasionally words are mis-transcribed.)    Love [place] MD (electronically signed)  Attending Emergency Physician          Love Park MD  06/12/21 2475

## 2021-06-24 ENCOUNTER — APPOINTMENT (OUTPATIENT)
Dept: GENERAL RADIOLOGY | Age: 26
End: 2021-06-24
Payer: MEDICAID

## 2021-06-24 ENCOUNTER — HOSPITAL ENCOUNTER (EMERGENCY)
Age: 26
Discharge: HOME | End: 2021-06-24
Payer: MEDICAID

## 2021-06-24 VITALS — BODY MASS INDEX: 20.47 KG/M2 | WEIGHT: 143 LBS | HEIGHT: 70 IN | TEMPERATURE: 97.3 F

## 2021-06-24 DIAGNOSIS — L03.113 CELLULITIS OF RIGHT UPPER EXTREMITY: ICD-10-CM

## 2021-06-24 DIAGNOSIS — F19.10 INTRAVENOUS DRUG ABUSE (HCC): ICD-10-CM

## 2021-06-24 DIAGNOSIS — G56.32 RADIAL NERVE COMPRESSION, LEFT: Primary | ICD-10-CM

## 2021-06-24 PROCEDURE — 99284 EMERGENCY DEPT VISIT MOD MDM: CPT

## 2021-06-24 PROCEDURE — 6370000000 HC RX 637 (ALT 250 FOR IP): Performed by: NURSE PRACTITIONER

## 2021-06-24 PROCEDURE — 73130 X-RAY EXAM OF HAND: CPT

## 2021-06-24 RX ORDER — IBUPROFEN 600 MG/1
600 TABLET ORAL ONCE
Status: COMPLETED | OUTPATIENT
Start: 2021-06-24 | End: 2021-06-24

## 2021-06-24 RX ORDER — CEPHALEXIN 500 MG/1
500 CAPSULE ORAL 2 TIMES DAILY
Qty: 14 CAPSULE | Refills: 0 | Status: SHIPPED | OUTPATIENT
Start: 2021-06-24 | End: 2021-07-01

## 2021-06-24 RX ORDER — SULFAMETHOXAZOLE AND TRIMETHOPRIM 800; 160 MG/1; MG/1
1 TABLET ORAL 2 TIMES DAILY
Qty: 14 TABLET | Refills: 0 | Status: SHIPPED | OUTPATIENT
Start: 2021-06-24 | End: 2021-07-01

## 2021-06-24 RX ADMIN — IBUPROFEN 600 MG: 600 TABLET, FILM COATED ORAL at 18:40

## 2021-06-24 ASSESSMENT — PAIN SCALES - GENERAL: PAINLEVEL_OUTOF10: 4

## 2021-06-24 NOTE — ED PROVIDER NOTES
[facility] ENCOUNTER        Pt Name: Raffaele Singh  MRN: [id number]  [place] 1995  Date of evaluation: 6/24/2021  Provider: RACHID Jiménez CNP  PCP: No primary care provider on file. Note Started: 6:14 PM EDT       MINI. I have evaluated this patient. My supervising physician was available for consultation. CHIEF COMPLAINT       Chief Complaint   Patient presents with    Cellulitis     rt wrist cellulitis, and left hand will open or close states he woke up at 4pm today, unsure if he slept on it wrong. pt uses herion, last used 1/2 prior to arrival,       HISTORY OF PRESENT ILLNESS   (Location, Timing/Onset, Context/Setting, Quality, Duration, Modifying Factors, Severity, Associated Signs and Symptoms)  Note limiting factors. Mack Bailey is a 31 y.o. male with medical history of ADHD, cyst on testicles, depression, hemorrhoids, hepatitis A opioid addiction and substance-induced mood disorder who presents to the ED with a possible cellulitis to his right wrist.  Patient does note IV drug use of fentanyl where he uses approximately 2 to 3 g daily. He has been using this for approximately 1 year. Total use of IVDA has been for the past 4 years. He last used half hour prior to arrival.  He has had previous abscesses in the skin before. Does note he fell asleep on his left side and when he awoke he felt like his left hand had pins-and-needles. He attempted to shake his arm to get a to wake up. He does note his hand is being held in a flexed position. Patient denies any history of any prior drug treatment. He did note that he is now having some paresthesias to his left hand. He denies denies any known injury, although is requesting an x-ray. Patient is right-handed. He smokes 1 pack/day, denies alcohol use, uses IVDA daily.   He denies any associated headache, neck pain, back pain, lightheaded, dizzy, syncope, nausea, vomiting, diarrhea, easy bruising or bleeding, or weakness. Nursing Notes were all reviewed and agreed with or any disagreements were addressed in the HPI. REVIEW OF SYSTEMS    (2-9 systems for level 4, 10 or more for level 5)     Review of Systems    Positives and Pertinent negatives as per HPI. Except as noted above in the ROS, all other systems were reviewed and negative.        PAST MEDICAL HISTORY     Past Medical History:   Diagnosis Date    ADHD (attention deficit hyperactivity disorder)     Cyst     testicles    Depression     Drug abuse, IV ([facility] [address]     heroine    Hemorrhoids     Hepatitis C     Heroin use          SURGICAL HISTORY     Past Surgical History:   Procedure Laterality Date    NASAL SEPTUM SURGERY      TONSILLECTOMY      TYMPANOSTOMY TUBE PLACEMENT           [address]       Discharge Medication List as of 6/24/2021  7:12 PM      CONTINUE these medications which have NOT CHANGED    Details   triamcinolone (KENALOG) 0.1 % cream Apply topically 2 times daily for 1 week., Disp-1 Tube, R-0, Print      ibuprofen (ADVIL;MOTRIN) 800 MG tablet Take 1 tablet by mouth 3 times daily as needed for Pain, Disp-15 tablet, R-0Print               ALLERGIES     Clindamycin, Clindamycin/lincomycin, and Azithromycin    FAMILYHISTORY       Family History   Problem Relation Age of Onset    Diabetes Mother     Depression Mother     Substance Abuse Mother     High Blood Pressure Maternal Grandmother     Mental Illness Father     Substance Abuse Brother           SOCIAL HISTORY       Social History     Tobacco Use    Smoking status: Current Every Day Smoker     Packs/day: 1.00     Years: 6.00     Pack years: 6.00     Types: Cigarettes    Smokeless tobacco: Current User     Types: Chew   Vaping Use    Vaping Use: Never used   Substance Use Topics    Alcohol use: No     Comment: social    Drug use: Yes     Frequency: 7.0 times per week     Types: Marijuana, IV, Opiates      Comment: fentynal       SCREENINGS    [place] Coma Scale  Eye Opening: To speech (pt has difficulty staying awake)  Best Verbal Response: Oriented  Best Motor Response: Obeys commands  [place] Coma Scale Score: 14        PHYSICAL EXAM    (up to 7 for level 4, 8 or more for level 5)     ED Triage Vitals [06/24/21 1753]   BP Temp Temp Source Pulse Resp SpO2 Height Weight   -- 97.3 °F (36.3 °C) Tympanic -- -- -- 5' 10\" (1.778 m) 143 lb (64.9 kg)       Physical Exam  Vitals and nursing note reviewed. Constitutional:       General: He is sleeping. Appearance: Normal appearance. He is well-developed and normal weight. HENT:      Head: Normocephalic and atraumatic. Nose: Nose normal.   Eyes:      General:         Right eye: No discharge. Left eye: No discharge. Cardiovascular:      Rate and Rhythm: Normal rate and regular rhythm. Pulses:           Radial pulses are 2+ on the right side and 2+ on the left side. Heart sounds: Normal heart sounds. Pulmonary:      Effort: Pulmonary effort is normal. No respiratory distress. Breath sounds: Normal breath sounds. Musculoskeletal:      Right wrist: Swelling (rt dorsal ulnar aspect with erythema) present. No tenderness. Normal range of motion. Left wrist: No swelling, deformity, effusion, lacerations, tenderness or bony tenderness. Decreased range of motion (extension, holding in a flexed position). Left hand: No swelling, deformity, lacerations, tenderness or bony tenderness. Decreased range of motion (extension). Decreased strength of finger abduction. Decreased sensation (paresthesia from wrist distally) of the radial distribution. Normal capillary refill. Normal pulse. Cervical back: Normal range of motion. Right lower leg: No edema. Left lower leg: No edema. Skin:     General: Skin is warm and dry. Coloration: Skin is not pale.       Comments: Track marks noted to bilateral upper extremities   Neurological: General: No focal deficit present. Mental Status: He is oriented to person, place, and time and easily aroused. Sensory: Sensory deficit (left hand) present. Gait: Gait is intact. Deep Tendon Reflexes:      Reflex Scores:       Brachioradialis reflexes are 2+ on the right side and 2+ on the left side. Comments: Hand grasp strength 5/5 right  Hand grasp strength 4/5 left   Psychiatric:         Behavior: Behavior normal. Behavior is cooperative. DIAGNOSTIC RESULTS   LABS:    Labs Reviewed - No data to display    All other labs were within normal range or not returned as of this dictation. EKG: All EKG's are interpreted by the Emergency Department Physician in the absence of a cardiologist.  Please see their note for interpretation of EKG. RADIOLOGY:   Non-plain film images such as CT, Ultrasound and MRI are read by the radiologist. Plain radiographic images are visualized and preliminarily interpreted by the ED Provider with the below findings:        Interpretation per the Radiologist below, if available at the time of this note:    XR HAND LEFT (MIN 3 VIEWS)   Final Result   Mild osteoarthritic changes throughout the digits with no acute bony   abnormality. No results found. PROCEDURES   Unless otherwise noted below, none     Procedures    CRITICAL CARE TIME   N/A    CONSULTS:  None      EMERGENCY DEPARTMENT COURSE and DIFFERENTIAL DIAGNOSIS/MDM:   Vitals:    Vitals:    06/24/21 1753   Temp: 97.3 °F (36.3 °C)   TempSrc: Tympanic   Weight: 143 lb (64.9 kg)   Height: 5' 10\" (1.778 m)       Patient was given the following medications:  Medications   ibuprofen (ADVIL;MOTRIN) tablet 600 mg (600 mg Oral Given 6/24/21 1840)           ED COURSE & MEDICAL DECISION MAKING    - The patient presented to the ER with complaints of  left hand paresthesias and redness to right wrist. Vital signs were reviewed. Exam well-developed, well-nourished male who appears uncomfortable. Imaging ordered. - Pertinent Labs & Imaging studies reviewed. (See chart for details)   -  Patient seen and evaluated in the emergency department. -  Triage and nursing notes reviewed and incorporated. -  Old chart records reviewed and incorporated. -  MINI. I have evaluated this patient. My supervising physician was available for consultation.  -  Differential diagnosis includes:  Sprain, strain, fracture, dislocation, carpal tunnel syndrome, abscess, tendinitis, bursitis, folliculitis, cellulitis, radial nerve palsy, versus COVID-19  -  Work-up included:  See above  -  ED treatment included:   Left wrist splint, Motrin  -  Results discussed with patient. Dainel Garber is a 48-year-old male presents to the ED with complaints of possible abscess to his right wrist.  Patient does admit to IVDA of fentanyl and thought he possibly had an abscess. States he has had numerous abscesses previously and has a few in various stages of healing. He does have track marks to bilateral upper extremities. He did note that he last used proximately 30 minutes prior to arrival and therefore is very drowsy while in the emergency department. When he did awake from his nap he had noticed some paresthesias to his left hand. States he is having difficulty with extension of the left hand. He denies any recent injection into the left hand will be concern for a possible foreign body or broken needle. Imaging was obtained and an x-ray of the left hand shows mild osteoarthritic changes throughout the digits with no acute bony abnormality. Patient was informed on these results. He is given a left wrist splint for support and comfort. He is discharged out prescription for antibiotics as that he has taken Keflex and Bactrim previously due to abscesses secondary from his IVDA. He was encouraged cessation of the IVDA. He was given follow-up clinic resources for detox and treatment.   Shared decision making was completed and

## 2021-06-24 NOTE — ED NOTES
Pt has multiple small wounds in different stages of healing, track marks on arms. Pt given po fluids.       Marco A Hare RN  06/24/21 9769

## 2021-07-04 ENCOUNTER — HOSPITAL ENCOUNTER (EMERGENCY)
Age: 26
Discharge: HOME | End: 2021-07-04
Attending: EMERGENCY MEDICINE
Payer: MEDICAID

## 2021-07-04 VITALS
OXYGEN SATURATION: 98 % | TEMPERATURE: 98.3 F | RESPIRATION RATE: 12 BRPM | HEART RATE: 64 BPM | SYSTOLIC BLOOD PRESSURE: 96 MMHG | WEIGHT: 138.67 LBS | HEIGHT: 70 IN | BODY MASS INDEX: 19.85 KG/M2 | DIASTOLIC BLOOD PRESSURE: 69 MMHG

## 2021-07-04 DIAGNOSIS — G56.32 RADIAL NERVE PALSY, LEFT: ICD-10-CM

## 2021-07-04 DIAGNOSIS — L03.114 CELLULITIS OF LEFT UPPER EXTREMITY: Primary | ICD-10-CM

## 2021-07-04 PROCEDURE — 29125 APPL SHORT ARM SPLINT STATIC: CPT

## 2021-07-04 PROCEDURE — 99283 EMERGENCY DEPT VISIT LOW MDM: CPT

## 2021-07-04 RX ORDER — CEPHALEXIN 500 MG/1
500 CAPSULE ORAL 4 TIMES DAILY
COMMUNITY
End: 2021-08-27

## 2021-07-04 RX ORDER — DOXYCYCLINE 100 MG/1
100 TABLET ORAL 2 TIMES DAILY
Qty: 20 TABLET | Refills: 0 | Status: SHIPPED | OUTPATIENT
Start: 2021-07-04 | End: 2021-07-14

## 2021-07-05 NOTE — ED PROVIDER NOTES
[address] ENCOUNTER      Pt Name: Kunal Enriquez  MRN: [id number]  Nathalia 1995  Date of evaluation: 7/4/2021  Provider: Torri Gordillo MD    CHIEF COMPLAINT     Per nursing:   Chief Complaint   Patient presents with    Abscess     L forearm, also admits to shooting up \"fentaynl above my elbow 1-2 weeks ago and it's still hard\" states recently placed on keflex and bactrim for abscesses and \"it doesn't seem to be working\"        Per my evaluation: Forearm abscess, left wrist drop    HISTORY OF PRESENT ILLNESS   (Location/Symptom, Timing/Onset,Context/Setting, Quality, Duration, Modifying Factors, Severity)  Note limiting factors. Mack López is a 31 y.o. male who presents to the emergency department for evaluation of infection to the left arm as well as a left wrist drop. Patient reports that he has had an area of erythema and induration to the left forearm for about the past 2 weeks. He states he did complete a course of Bactrim and Keflex, but he did not note significant improvement in this area. Patient also reports that he injected fentanyl subcutaneously to the left bicep yesterday and has had a bulging area there since then as well. He denies any significant pain or tenderness to that area. He denies any fever or chills. Patient does report that for greater than the past 3 weeks, he has had a left wrist drop. He states that he was asleep, and woke up with the symptoms. He reports numbness to the dorsum of the hand and extending along the ulnar aspect of the arm. He denies any other focal numbness or weakness. NursingNotes were reviewed. REVIEW OF SYSTEMS    (2-9 systems for level 4, 10 or more for level 5)       Constitutional: No fever or chills. Respiratory: No cough, No shortness of breath, No sputum production. Cardiovascular: No chest pain. No palpitations. Gastrointestinal: No abdominal pain.  No nausea or vomiting  Genitourinary: No dysuria. No hematuria. Hematology/Lymphatics: No bleeding or bruising tendency. Immunologic: No malaise. No swollen glands. Musculoskeletal: No back pain. No joint pain. Integumentary: Infection to left forearm. Neurologic: No headache. Left wrist drop.       PAST MEDICAL HISTORY     Past Medical History:   Diagnosis Date    ADHD (attention deficit hyperactivity disorder)     Cyst     testicles    Depression     Drug abuse, IV ([address]     heroine    Hemorrhoids     Hepatitis C     Heroin use          SURGICALHISTORY       Past Surgical History:   Procedure Laterality Date    NASAL SEPTUM SURGERY      TONSILLECTOMY      TYMPANOSTOMY TUBE PLACEMENT           CURRENT MEDICATIONS       Discharge Medication List as of 7/4/2021 10:14 PM      CONTINUE these medications which have NOT CHANGED    Details   cephALEXin (KEFLEX) 500 MG capsule Take 500 mg by mouth 4 times dailyHistorical Med      ibuprofen (ADVIL;MOTRIN) 800 MG tablet Take 1 tablet by mouth 3 times daily as needed for Pain, Disp-15 tablet, R-0Print             ALLERGIES     Clindamycin, Clindamycin/lincomycin, and Azithromycin    FAMILY HISTORY       Family History   Problem Relation Age of Onset    Diabetes Mother     Depression Mother     Substance Abuse Mother     High Blood Pressure Maternal Grandmother     Mental Illness Father     Substance Abuse Brother           SOCIAL HISTORY       Social History     Socioeconomic History    Marital status: Single     Spouse name: None    Number of children: None    Years of education: None    Highest education level: None   Occupational History    Occupation: unemployed   Tobacco Use    Smoking status: Current Every Day Smoker     Packs/day: 1.00     Years: 6.00     Pack years: 6.00     Types: Cigarettes    Smokeless tobacco: Current User     Types: Chew   Vaping Use    Vaping Use: Never used   Substance and Sexual Activity    Alcohol use: No     Comment: social  Drug use: Yes     Frequency: 7.0 times per week     Types: Marijuana, IV, Opiates      Comment: fentynal    Sexual activity: Yes   Other Topics Concern    None   Social History Narrative    None     Social Determinants of Health     Financial Resource Strain:     Difficulty of Paying Living Expenses:    Food Insecurity:     Worried About Running Out of Food in the Last Year:     Ran Out of Food in the Last Year:    Transportation Needs:     Lack of Transportation (Medical):  Lack of Transportation (Non-Medical):    Physical Activity:     Days of Exercise per Week:     Minutes of Exercise per Session:    Stress:     Feeling of Stress :    Social Connections:     Frequency of Communication with Friends and Family:     Frequency of Social Gatherings with Friends and Family:     Attends Mandaen Services:     Active Member of Clubs or Organizations:     Attends Club or Organization Meetings:     Marital Status:    Intimate Partner Violence:     Fear of Current or Ex-Partner:     Emotionally Abused:     Physically Abused:     Sexually Abused:        SCREENINGS             PHYSICAL EXAM    (up to 7 for level 4, 8 or more for level 5)     ED Triage Vitals [07/04/21 2141]   BP Temp Temp Source Pulse Resp SpO2 Height Weight   96/69 98.3 °F (36.8 °C) Oral 64 12 98 % 5' 10\" (1.778 m) 138 lb 10.7 oz (62.9 kg)       General: Alert and oriented, No acute distress. Eye: Normal conjunctiva. Pupils equal and reactive. HENT: Oral mucosa is moist.  Respiratory: Lungs are clear to auscultation, Respirations are non-labored. Cardiovascular: Normal rate, Regular rhythm. Gastrointestinal: Soft, Non-tender, Non-distended. Musculoskeletal: No swelling. Integumentary: Warm, Dry. Area of erythema and tenderness extending approximately 2 cm diameter to the volar left forearm, with 2 scabbed areas centrally. Bedside ultrasound without underlying fluid collection.   There is some induration to the left bicep at the area of fentanyl injection, but no overlying erythema or tenderness. Neurologic: Alert, Oriented, No focal defects. Decreased strength to left wrist extension, finger extension is weakened as well however  strength is 5 out of 5, wrist flexion and elbow movement is normal as well. Decreased sensation to the dorsum of the hand and the ulnar aspect of the distal left forearm. Psychiatric: Cooperative. DIAGNOSTIC RESULTS     EKG: All EKG's are interpreted by the Emergency Department Physician who either signs or Co-signsthis chart in the absence of a cardiologist.      RADIOLOGY:   Fausto Cordial such as CT, Ultrasound and MRI are read by the radiologist. Plain radiographic images are visualized and preliminarily interpreted by the emergency physician with the below findings:      Interpretation per the Radiologist below, if available at the time ofthis note:    No orders to display         ED BEDSIDE ULTRASOUND:   Performed by ED Physician - none    LABS:  Labs Reviewed - No data to display        [address] [place] [address] and DIFFERENTIAL DIAGNOSIS/MDM:   Vitals:    Vitals:    07/04/21 2141   BP: 96/69   Pulse: 64   Resp: 12   Temp: 98.3 °F (36.8 °C)   TempSrc: Oral   SpO2: 98%   Weight: 138 lb 10.7 oz (62.9 kg)   Height: 5' 10\" (1.778 m)         Medical decision making: This is a 59-year-old male who presents for evaluation of a infection to the left forearm, as well as a chronic left wrist drop for more than the past 3 weeks. On exam he is well-appearing, afebrile, with normal vital signs. He does have an area of erythema and tenderness to the volar aspect of the left forearm however bedside ultrasound was performed and there is no subcutaneous fluid collection which would be amenable to I&D. Patient already completed a course of Keflex and Bactrim, therefore be prescribed doxycycline for further treatment of presumed cellulitis.   In terms of the wrist drop, exam is consistent with a radial nerve palsy, likely from position during sleep, particularly with history of IVDA. Wrist brace is provided for the patient he is given Information to follow-up with neurology. Patient also follow-up with PCP for wound check. Discharged in stable condition. CRITICAL CARE TIME   Total Critical Care time was 0 minutes, excluding separately reportable procedures. There was a high probability of clinically significant/life threatening deterioration in the patient's condition which required my urgent intervention. CONSULTS:  None    PROCEDURES:  Unless otherwise noted below, none         FINAL IMPRESSION      1. Cellulitis of left upper extremity    2.  Radial nerve palsy, left          DISPOSITION/PLAN   DISPOSITION Decision To Discharge 07/04/2021 10:20:39 PM      PATIENT REFERRED TO:  Nancy Mccallum, [address] [facility]  [address] [place] [address]  [phone]    Schedule an appointment as soon as possible for a visit in 1 week      Your PCP    Schedule an appointment as soon as possible for a visit in 2 days  For wound re-check      DISCHARGE MEDICATIONS:  Discharge Medication List as of 7/4/2021 10:14 PM      START taking these medications    Details   doxycycline monohydrate (ADOXA) 100 MG tablet Take 1 tablet by mouth 2 times daily for 10 days, Disp-20 tablet, R-0Print                (Please note that portions of this note were completed with a voice recognition program.Efforts were made to edit the dictations but occasionally words are mis-transcribed.)    Robbie Brooks MD (electronically signed)  Attending Emergency Physician        Robbie Brooks MD  07/04/21 0930

## 2021-07-05 NOTE — ED NOTES
Splint placed, tolerated well, Reviewed instructions with patient, verb under, discharged home     Marla Jessica [facility]  07/04/21 8493

## 2021-08-27 ENCOUNTER — HOSPITAL ENCOUNTER (EMERGENCY)
Age: 26
Discharge: HOME | End: 2021-08-27
Attending: EMERGENCY MEDICINE
Payer: MEDICAID

## 2021-08-27 VITALS
RESPIRATION RATE: 17 BRPM | HEART RATE: 88 BPM | OXYGEN SATURATION: 98 % | HEIGHT: 71 IN | DIASTOLIC BLOOD PRESSURE: 80 MMHG | TEMPERATURE: 98.6 F | SYSTOLIC BLOOD PRESSURE: 123 MMHG | BODY MASS INDEX: 19.54 KG/M2 | WEIGHT: 139.55 LBS

## 2021-08-27 DIAGNOSIS — K02.9 DENTAL CARIES: ICD-10-CM
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
## 2021-08-27 PROCEDURE — 99283 EMERGENCY DEPT VISIT LOW MDM: CPT

## 2021-08-27 PROCEDURE — 90471 IMMUNIZATION ADMIN: CPT | Performed by: EMERGENCY MEDICINE

## 2021-08-27 PROCEDURE — 6360000002 HC RX W HCPCS: Performed by: EMERGENCY MEDICINE

## 2021-08-27 PROCEDURE — 6370000000 HC RX 637 (ALT 250 FOR IP): Performed by: EMERGENCY MEDICINE

## 2021-08-27 PROCEDURE — 90715 TDAP VACCINE 7 YRS/> IM: CPT | Performed by: EMERGENCY MEDICINE

## 2021-08-27 RX ORDER — AMOXICILLIN AND CLAVULANATE POTASSIUM 875; 125 MG/1; MG/1
1 TABLET, FILM COATED ORAL ONCE
Status: COMPLETED | OUTPATIENT
Start: 2021-08-27 | End: 2021-08-27

## 2021-08-27 RX ORDER — IBUPROFEN 400 MG/1
400 TABLET ORAL EVERY 6 HOURS PRN
Qty: 120 TABLET | Refills: 0 | Status: ON HOLD | OUTPATIENT
Start: 2021-08-27 | End: 2021-10-11 | Stop reason: ALTCHOICE

## 2021-08-27 RX ORDER — CHLORHEXIDINE GLUCONATE 0.12 MG/ML
15 RINSE ORAL 2 TIMES DAILY
Qty: 420 ML | Refills: 0 | Status: SHIPPED | OUTPATIENT
Start: 2021-08-27 | End: 2021-09-10

## 2021-08-27 RX ORDER — AMOXICILLIN AND CLAVULANATE POTASSIUM 875; 125 MG/1; MG/1
1 TABLET, FILM COATED ORAL 2 TIMES DAILY
Qty: 20 TABLET | Refills: 0 | Status: SHIPPED | OUTPATIENT
Start: 2021-08-27 | End: 2021-09-06

## 2021-08-27 RX ADMIN — AMOXICILLIN AND CLAVULANATE POTASSIUM 1 TABLET: 875; 125 TABLET, FILM COATED ORAL at 21:42

## 2021-08-27 RX ADMIN — TETANUS TOXOID, REDUCED DIPHTHERIA TOXOID AND ACELLULAR PERTUSSIS VACCINE, ADSORBED 0.5 ML: 5; 2.5; 8; 8; 2.5 SUSPENSION INTRAMUSCULAR at 21:51

## 2021-08-28 NOTE — ED PROVIDER NOTES
Emergency Department Encounter    Patient: Elton Whalen  MRN: [id number]  : 1995  Date of Evaluation: 2021  ED Provider:  Hardy Khan MD    Triage Chief Complaint:   Animal Bite (pt has a dog bite to his lip and chin from his dog at home)    [place]:  Elton Whalen is a 31 y.o. male that presents to the ER for evaluation of oral pharyngeal dog bite, with multiple dental caries positive IVDA last use was $20 worth of heroin today. Multiple dental caries superficial laceration no vermilion border laceration.     ROS - see HPI, below listed is current ROS at time of my eval:  General:  No fevers  Eyes:  no discharge  ENT:  No sore throat, no nasal congestion, no hearing changes, + dental pain  Cardiovascular:  No chest pain  Respiratory:  no cough  Gastrointestinal:  no nausea, no vomiting  Musculoskeletal:   no joint pain  Skin:  No rash  Neurologic:  No speech problems, no headache  Genitourinary:  No dysuria    Past Medical History:   Diagnosis Date    ADHD (attention deficit hyperactivity disorder)     Cyst     testicles    Depression     Drug abuse, IV ([address]     heroine    Hemorrhoids     Hepatitis C     Heroin use      Past Surgical History:   Procedure Laterality Date    NASAL SEPTUM SURGERY      TONSILLECTOMY      TYMPANOSTOMY TUBE PLACEMENT       Family History   Problem Relation Age of Onset    Diabetes Mother     Depression Mother     Substance Abuse Mother     High Blood Pressure Maternal Grandmother     Mental Illness Father     Substance Abuse Brother      Social History     Socioeconomic History    Marital status: Single     Spouse name: Not on file    Number of children: Not on file    Years of education: Not on file    Highest education level: Not on file   Occupational History    Occupation: unemployed   Tobacco Use    Smoking status: Current Every Day Smoker     Packs/day: 1.00     Years: 6.00     Pack years: 6.00     Types: Cigarettes    Smokeless

## 2021-09-04 ENCOUNTER — HOSPITAL ENCOUNTER (EMERGENCY)
Age: 26
Discharge: LEFT BEFORE DISCHARGE | End: 2021-09-04
Attending: EMERGENCY MEDICINE
Payer: MEDICAID

## 2021-09-04 VITALS
OXYGEN SATURATION: 95 % | DIASTOLIC BLOOD PRESSURE: 67 MMHG | SYSTOLIC BLOOD PRESSURE: 100 MMHG | HEIGHT: 72 IN | HEART RATE: 77 BPM | TEMPERATURE: 98 F | WEIGHT: 145.72 LBS | BODY MASS INDEX: 19.74 KG/M2 | RESPIRATION RATE: 14 BRPM

## 2021-09-04 DIAGNOSIS — T50.901A ACCIDENTAL DRUG OVERDOSE, INITIAL ENCOUNTER: Primary | ICD-10-CM

## 2021-09-04 LAB
GLUCOSE BLD-MCNC: 122 MG/DL
GLUCOSE BLD-MCNC: 122 MG/DL (ref 70–99)
PERFORMED ON: ABNORMAL

## 2021-09-04 PROCEDURE — 99283 EMERGENCY DEPT VISIT LOW MDM: CPT

## 2021-09-04 NOTE — ED PROVIDER NOTES
Triage Chief Complaint:   Drug Overdose (pt brought per Jaimee from [facility] bathroom found overdosed was given 4mg of narcan per Sq)    [place]:  Mack Patten is a 31 y.o. male that presents status post overdose in pharmacy bathroom. EMS was summoned who found him unresponsive and gave him Narcan. He arrives here awake, oriented to self and location. He has no complaints and after arriving initially lethargic he then became very animated, cursing at us, describing how angry he is that he had his \"$60 Shirt\" ripped by EMS, refusing care    ROS:  At least 12 systems reviewed and otherwise acutely negative except as in the [address]     Past Medical History:   Diagnosis Date    ADHD (attention deficit hyperactivity disorder)     Cyst     testicles    Depression     Drug abuse, IV ([address]     heroine    Hemorrhoids     Hepatitis C     Heroin use      Past Surgical History:   Procedure Laterality Date    NASAL SEPTUM SURGERY      TONSILLECTOMY      TYMPANOSTOMY TUBE PLACEMENT       Family History   Problem Relation Age of Onset    Diabetes Mother     Depression Mother     Substance Abuse Mother     High Blood Pressure Maternal Grandmother     Mental Illness Father     Substance Abuse Brother      Social History     Socioeconomic History    Marital status: Single     Spouse name: Not on file    Number of children: Not on file    Years of education: Not on file    Highest education level: Not on file   Occupational History    Occupation: unemployed   Tobacco Use    Smoking status: Current Every Day Smoker     Packs/day: 1.00     Years: 6.00     Pack years: 6.00     Types: Cigarettes    Smokeless tobacco: Current User     Types: Chew   Vaping Use    Vaping Use: Never used   Substance and Sexual Activity    Alcohol use: No     Comment: social    Drug use: Yes     Frequency: 7.0 times per week     Types: Marijuana, IV, Opiates      Comment: fentynal    Sexual activity: Yes   Other Topics Concern    Not on Atraumatic. EYES: EOM's grossly intact. Sclera anicteric. ENT: Mucous membranes are moist. Tolerates saliva. No trismus. NECK: Supple. No meningismus. Trachea midline. HEART: RRR. Radial pulses 2+. LUNGS: Respirations unlabored. CTAB  ABDOMEN: Soft. Non-tender. No guarding or rebound. EXTREMITIES: No acute deformities. SKIN: Warm and dry. NEUROLOGICAL: No gross facial drooping. Moves all 4 extremities spontaneously. PSYCHIATRIC: No SI/HI    I have reviewed and interpreted all of the currently available lab results from this visit (if applicable):  Results for orders placed or performed during the hospital encounter of 09/04/21   POCT Glucose   Result Value Ref Range    Glucose 122 mg/dL   POCT Glucose   Result Value Ref Range    POC Glucose 122 (H) 70 - 99 mg/dl    Performed on ACCU-CHEK         Radiographs (if obtained):  [] The following radiograph was interpreted by myself in the absence of a radiologist:  [x] Radiologist's Report Reviewed:  n/a    EKG (if obtained): (All EKG's are interpreted by myself in the absence of a cardiologist)  Initial EKG on my interpretation shows n/a    MDM:  Differential diagnosis: Overdose, hypoglycemia, hyperglycemia    Patient's blood glucose is 122. The patient arrived somewhat groggy but conversant, alert, aware of situation/location/identity. Soon thereafter after we attempted to place an IV fo he became extremely belligerent. He began cursing, pacing and calling rude names to the staff. He began complaining that his \"$60 shirt\" was ripped by EMS. When staff tried to tell him that that was done in order to save his life he said \"I don't care\". Ultimately when I was in another room seeing other patients apparently the patient got up and left the department. There was no discussion regarding the risk/benefits of leaving AMA. He did not leave AMA, he simply eloped/left during treatment. Old records reviewed.  Labs and imaging reviewed and results discussed with patient. .        Patient was given scripts for the following medications. I counseled patient how to take these medications. New Prescriptions    No medications on file         CRITICAL CARE TIME   Total Critical Care time was 0 minutes, excluding separately reportable procedures. There was a high probability of clinically significant/life threatening deterioration in the patient's condition which required my urgent intervention. Clinical Impression:  1.  Accidental drug overdose, initial encounter       (Please note that portions of this note may have been completed with a voice recognition program. Efforts were made to edit the dictations but occasionally words are mis-transcribed.)    MD Erika Jay MD  09/04/21 3344

## 2021-09-04 NOTE — ED NOTES
Pt pulling monitor and leads off getting out of bed unable to talk pt back to bed. Pt states Im leaving pt  very agitated   Yelling at us.      Dinh De Santiago RN  09/04/21 2220

## 2021-09-04 NOTE — ED NOTES
Pt exited ER yelling about shirt being cut   Trying to tell pt Sq had to cut shirt because he was unresponsive. Pt just kept walking out.      Zara Mandel RN  09/04/21 5426

## 2021-09-15 ENCOUNTER — APPOINTMENT (OUTPATIENT)
Dept: GENERAL RADIOLOGY | Age: 26
DRG: 812 | End: 2021-09-15
Payer: MEDICAID

## 2021-09-15 ENCOUNTER — APPOINTMENT (OUTPATIENT)
Dept: CT IMAGING | Age: 26
DRG: 812 | End: 2021-09-15
Payer: MEDICAID

## 2021-09-15 ENCOUNTER — HOSPITAL ENCOUNTER (INPATIENT)
Age: 26
LOS: 2 days | Discharge: LEFT BEFORE DISCHARGE | DRG: 812 | End: 2021-09-18
Attending: EMERGENCY MEDICINE | Admitting: INTERNAL MEDICINE
Payer: MEDICAID

## 2021-09-15 DIAGNOSIS — B37.41 YEAST CYSTITIS: ICD-10-CM

## 2021-09-15 DIAGNOSIS — G93.40 ACUTE ENCEPHALOPATHY: Primary | ICD-10-CM

## 2021-09-15 DIAGNOSIS — R74.8 ELEVATED LIVER ENZYMES: ICD-10-CM

## 2021-09-15 DIAGNOSIS — F19.10 POLYSUBSTANCE ABUSE (HCC): ICD-10-CM

## 2021-09-15 LAB
A/G RATIO: 1.3 (ref 1.1–2.2)
ACETAMINOPHEN LEVEL: <5 UG/ML (ref 10–30)
ALBUMIN SERPL-MCNC: 5 G/DL (ref 3.4–5)
ALP BLD-CCNC: 128 U/L (ref 40–129)
ALT SERPL-CCNC: 71 U/L (ref 10–40)
AMPHETAMINE SCREEN, URINE: POSITIVE
ANION GAP SERPL CALCULATED.3IONS-SCNC: 19 MMOL/L (ref 3–16)
AST SERPL-CCNC: 71 U/L (ref 15–37)
BARBITURATE SCREEN URINE: ABNORMAL
BASOPHILS ABSOLUTE: 0 K/UL (ref 0–0.2)
BASOPHILS RELATIVE PERCENT: 0.1 %
BENZODIAZEPINE SCREEN, URINE: POSITIVE
BILIRUB SERPL-MCNC: 0.6 MG/DL (ref 0–1)
BILIRUBIN URINE: ABNORMAL
BLOOD, URINE: NEGATIVE
BUN BLDV-MCNC: 16 MG/DL (ref 7–20)
CALCIUM SERPL-MCNC: 10.3 MG/DL (ref 8.3–10.6)
CANNABINOID SCREEN URINE: ABNORMAL
CHLORIDE BLD-SCNC: 103 MMOL/L (ref 99–110)
CLARITY: CLEAR
CO2: 22 MMOL/L (ref 21–32)
COCAINE METABOLITE SCREEN URINE: ABNORMAL
COLOR: YELLOW
CREAT SERPL-MCNC: 0.7 MG/DL (ref 0.9–1.3)
EKG ATRIAL RATE: 70 BPM
EKG DIAGNOSIS: NORMAL
EKG P AXIS: 76 DEGREES
EKG P-R INTERVAL: 132 MS
EKG Q-T INTERVAL: 376 MS
EKG QRS DURATION: 94 MS
EKG QTC CALCULATION (BAZETT): 406 MS
EKG R AXIS: 77 DEGREES
EKG T AXIS: 69 DEGREES
EKG VENTRICULAR RATE: 70 BPM
EOSINOPHILS ABSOLUTE: 0 K/UL (ref 0–0.6)
EOSINOPHILS RELATIVE PERCENT: 0 %
EPITHELIAL CELLS, UA: ABNORMAL /HPF (ref 0–5)
ETHANOL: NORMAL MG/DL (ref 0–0.08)
GFR AFRICAN AMERICAN: >60
GFR NON-AFRICAN AMERICAN: >60
GLOBULIN: 4 G/DL
GLUCOSE BLD-MCNC: 126 MG/DL (ref 70–99)
GLUCOSE URINE: NEGATIVE MG/DL
HCT VFR BLD CALC: 42.3 % (ref 40.5–52.5)
HEMOGLOBIN: 14.4 G/DL (ref 13.5–17.5)
HYALINE CASTS: ABNORMAL /LPF (ref 0–2)
INR BLD: 1.3 (ref 0.88–1.12)
KETONES, URINE: >=80 MG/DL
LACTIC ACID: 2.1 MMOL/L (ref 0.4–2)
LEUKOCYTE ESTERASE, URINE: NEGATIVE
LYMPHOCYTES ABSOLUTE: 0.6 K/UL (ref 1–5.1)
LYMPHOCYTES RELATIVE PERCENT: 5.6 %
Lab: ABNORMAL
MCH RBC QN AUTO: 28.1 PG (ref 26–34)
MCHC RBC AUTO-ENTMCNC: 34.1 G/DL (ref 31–36)
MCV RBC AUTO: 82.3 FL (ref 80–100)
METHADONE SCREEN, URINE: ABNORMAL
MICROSCOPIC EXAMINATION: YES
MONOCYTES ABSOLUTE: 0.4 K/UL (ref 0–1.3)
MONOCYTES RELATIVE PERCENT: 3.4 %
MUCUS: ABNORMAL /LPF
NEUTROPHILS ABSOLUTE: 10.4 K/UL (ref 1.7–7.7)
NEUTROPHILS RELATIVE PERCENT: 90.9 %
NITRITE, URINE: NEGATIVE
OPIATE SCREEN URINE: ABNORMAL
OXYCODONE URINE: ABNORMAL
PDW BLD-RTO: 13.8 % (ref 12.4–15.4)
PH UA: 6
PH UA: 6 (ref 5–8)
PHENCYCLIDINE SCREEN URINE: ABNORMAL
PLATELET # BLD: 263 K/UL (ref 135–450)
PMV BLD AUTO: 7.6 FL (ref 5–10.5)
POTASSIUM REFLEX MAGNESIUM: 4.5 MMOL/L (ref 3.5–5.1)
PROPOXYPHENE SCREEN: ABNORMAL
PROTEIN UA: 30 MG/DL
PROTHROMBIN TIME: 14.9 SEC (ref 9.9–12.7)
RBC # BLD: 5.15 M/UL (ref 4.2–5.9)
RBC UA: ABNORMAL /HPF (ref 0–4)
SALICYLATE, SERUM: <0.3 MG/DL (ref 15–30)
SODIUM BLD-SCNC: 144 MMOL/L (ref 136–145)
SPECIFIC GRAVITY UA: >=1.03 (ref 1–1.03)
TOTAL PROTEIN: 9 G/DL (ref 6.4–8.2)
TROPONIN: <0.01 NG/ML
URINE REFLEX TO CULTURE: ABNORMAL
URINE TYPE: ABNORMAL
UROBILINOGEN, URINE: 0.2 E.U./DL
WBC # BLD: 11.4 K/UL (ref 4–11)
WBC UA: ABNORMAL /HPF (ref 0–5)
YEAST: PRESENT /HPF

## 2021-09-15 PROCEDURE — 96372 THER/PROPH/DIAG INJ SC/IM: CPT

## 2021-09-15 PROCEDURE — 36415 COLL VENOUS BLD VENIPUNCTURE: CPT

## 2021-09-15 PROCEDURE — 2500000003 HC RX 250 WO HCPCS: Performed by: INTERNAL MEDICINE

## 2021-09-15 PROCEDURE — 2000000000 HC ICU R&B

## 2021-09-15 PROCEDURE — 85610 PROTHROMBIN TIME: CPT

## 2021-09-15 PROCEDURE — 96374 THER/PROPH/DIAG INJ IV PUSH: CPT

## 2021-09-15 PROCEDURE — 80143 DRUG ASSAY ACETAMINOPHEN: CPT

## 2021-09-15 PROCEDURE — 2580000003 HC RX 258: Performed by: NURSE PRACTITIONER

## 2021-09-15 PROCEDURE — 84484 ASSAY OF TROPONIN QUANT: CPT

## 2021-09-15 PROCEDURE — 80307 DRUG TEST PRSMV CHEM ANLYZR: CPT

## 2021-09-15 PROCEDURE — 71045 X-RAY EXAM CHEST 1 VIEW: CPT

## 2021-09-15 PROCEDURE — 81001 URINALYSIS AUTO W/SCOPE: CPT

## 2021-09-15 PROCEDURE — 99283 EMERGENCY DEPT VISIT LOW MDM: CPT

## 2021-09-15 PROCEDURE — 6360000002 HC RX W HCPCS: Performed by: EMERGENCY MEDICINE

## 2021-09-15 PROCEDURE — 80179 DRUG ASSAY SALICYLATE: CPT

## 2021-09-15 PROCEDURE — 85025 COMPLETE CBC W/AUTO DIFF WBC: CPT

## 2021-09-15 PROCEDURE — 93005 ELECTROCARDIOGRAM TRACING: CPT | Performed by: EMERGENCY MEDICINE

## 2021-09-15 PROCEDURE — 83605 ASSAY OF LACTIC ACID: CPT

## 2021-09-15 PROCEDURE — G0378 HOSPITAL OBSERVATION PER HR: HCPCS

## 2021-09-15 PROCEDURE — 80053 COMPREHEN METABOLIC PANEL: CPT

## 2021-09-15 PROCEDURE — 94761 N-INVAS EAR/PLS OXIMETRY MLT: CPT

## 2021-09-15 PROCEDURE — 93010 ELECTROCARDIOGRAM REPORT: CPT | Performed by: INTERNAL MEDICINE

## 2021-09-15 PROCEDURE — 51701 INSERT BLADDER CATHETER: CPT

## 2021-09-15 PROCEDURE — 70450 CT HEAD/BRAIN W/O DYE: CPT

## 2021-09-15 PROCEDURE — 6360000002 HC RX W HCPCS: Performed by: INTERNAL MEDICINE

## 2021-09-15 PROCEDURE — 82077 ASSAY SPEC XCP UR&BREATH IA: CPT

## 2021-09-15 RX ORDER — LORAZEPAM 2 MG/ML
1 INJECTION INTRAMUSCULAR ONCE
Status: DISCONTINUED | OUTPATIENT
Start: 2021-09-15 | End: 2021-09-15

## 2021-09-15 RX ORDER — HALOPERIDOL 5 MG/ML
5 INJECTION INTRAMUSCULAR ONCE
Status: COMPLETED | OUTPATIENT
Start: 2021-09-15 | End: 2021-09-15

## 2021-09-15 RX ORDER — TRAZODONE HYDROCHLORIDE 50 MG/1
50 TABLET ORAL NIGHTLY PRN
Status: DISCONTINUED | OUTPATIENT
Start: 2021-09-15 | End: 2021-09-18 | Stop reason: HOSPADM

## 2021-09-15 RX ORDER — NICOTINE 21 MG/24HR
1 PATCH, TRANSDERMAL 24 HOURS TRANSDERMAL DAILY PRN
Status: DISCONTINUED | OUTPATIENT
Start: 2021-09-15 | End: 2021-09-18 | Stop reason: HOSPADM

## 2021-09-15 RX ORDER — ONDANSETRON 2 MG/ML
4 INJECTION INTRAMUSCULAR; INTRAVENOUS ONCE
Status: COMPLETED | OUTPATIENT
Start: 2021-09-15 | End: 2021-09-15

## 2021-09-15 RX ORDER — SODIUM CHLORIDE 0.9 % (FLUSH) 0.9 %
5-40 SYRINGE (ML) INJECTION PRN
Status: DISCONTINUED | OUTPATIENT
Start: 2021-09-15 | End: 2021-09-18 | Stop reason: HOSPADM

## 2021-09-15 RX ORDER — DEXMEDETOMIDINE HYDROCHLORIDE 4 UG/ML
.2-1.4 INJECTION, SOLUTION INTRAVENOUS CONTINUOUS
Status: DISCONTINUED | OUTPATIENT
Start: 2021-09-15 | End: 2021-09-18 | Stop reason: HOSPADM

## 2021-09-15 RX ORDER — LANOLIN ALCOHOL/MO/W.PET/CERES
3 CREAM (GRAM) TOPICAL NIGHTLY
Status: DISCONTINUED | OUTPATIENT
Start: 2021-09-15 | End: 2021-09-18 | Stop reason: HOSPADM

## 2021-09-15 RX ORDER — ONDANSETRON 2 MG/ML
4 INJECTION INTRAMUSCULAR; INTRAVENOUS ONCE
Status: DISCONTINUED | OUTPATIENT
Start: 2021-09-15 | End: 2021-09-15

## 2021-09-15 RX ORDER — MIDAZOLAM HYDROCHLORIDE 1 MG/ML
1 INJECTION INTRAMUSCULAR; INTRAVENOUS
Status: DISCONTINUED | OUTPATIENT
Start: 2021-09-15 | End: 2021-09-15

## 2021-09-15 RX ORDER — ONDANSETRON 2 MG/ML
4 INJECTION INTRAMUSCULAR; INTRAVENOUS EVERY 6 HOURS PRN
Status: DISCONTINUED | OUTPATIENT
Start: 2021-09-15 | End: 2021-09-18 | Stop reason: HOSPADM

## 2021-09-15 RX ORDER — ZIPRASIDONE MESYLATE 20 MG/ML
20 INJECTION, POWDER, LYOPHILIZED, FOR SOLUTION INTRAMUSCULAR EVERY 12 HOURS PRN
Status: DISCONTINUED | OUTPATIENT
Start: 2021-09-15 | End: 2021-09-18 | Stop reason: HOSPADM

## 2021-09-15 RX ORDER — SODIUM CHLORIDE 0.9 % (FLUSH) 0.9 %
5-40 SYRINGE (ML) INJECTION EVERY 12 HOURS SCHEDULED
Status: DISCONTINUED | OUTPATIENT
Start: 2021-09-15 | End: 2021-09-18 | Stop reason: HOSPADM

## 2021-09-15 RX ORDER — SODIUM CHLORIDE 9 MG/ML
25 INJECTION, SOLUTION INTRAVENOUS PRN
Status: DISCONTINUED | OUTPATIENT
Start: 2021-09-15 | End: 2021-09-18 | Stop reason: HOSPADM

## 2021-09-15 RX ORDER — ZIPRASIDONE MESYLATE 20 MG/ML
20 INJECTION, POWDER, LYOPHILIZED, FOR SOLUTION INTRAMUSCULAR ONCE
Status: COMPLETED | OUTPATIENT
Start: 2021-09-15 | End: 2021-09-15

## 2021-09-15 RX ORDER — DICYCLOMINE HCL 20 MG
20 TABLET ORAL EVERY 6 HOURS PRN
Status: DISCONTINUED | OUTPATIENT
Start: 2021-09-15 | End: 2021-09-18 | Stop reason: HOSPADM

## 2021-09-15 RX ORDER — MIDAZOLAM HYDROCHLORIDE 1 MG/ML
2 INJECTION INTRAMUSCULAR; INTRAVENOUS
Status: DISCONTINUED | OUTPATIENT
Start: 2021-09-15 | End: 2021-09-18 | Stop reason: HOSPADM

## 2021-09-15 RX ORDER — ONDANSETRON 4 MG/1
4 TABLET, ORALLY DISINTEGRATING ORAL EVERY 8 HOURS PRN
Status: DISCONTINUED | OUTPATIENT
Start: 2021-09-15 | End: 2021-09-18 | Stop reason: HOSPADM

## 2021-09-15 RX ORDER — LORAZEPAM 2 MG/ML
2 INJECTION INTRAMUSCULAR
Status: DISCONTINUED | OUTPATIENT
Start: 2021-09-15 | End: 2021-09-18 | Stop reason: HOSPADM

## 2021-09-15 RX ADMIN — HALOPERIDOL LACTATE 5 MG: 5 INJECTION, SOLUTION INTRAMUSCULAR at 12:12

## 2021-09-15 RX ADMIN — MIDAZOLAM 1 MG: 1 INJECTION INTRAMUSCULAR; INTRAVENOUS at 20:49

## 2021-09-15 RX ADMIN — LORAZEPAM 2 MG: 2 INJECTION INTRAMUSCULAR; INTRAVENOUS at 21:46

## 2021-09-15 RX ADMIN — ONDANSETRON 4 MG: 2 INJECTION INTRAMUSCULAR; INTRAVENOUS at 14:13

## 2021-09-15 RX ADMIN — ZIPRASIDONE MESYLATE 20 MG: 20 INJECTION, POWDER, LYOPHILIZED, FOR SOLUTION INTRAMUSCULAR at 22:10

## 2021-09-15 RX ADMIN — Medication 10 ML: at 21:33

## 2021-09-15 RX ADMIN — DEXMEDETOMIDINE HYDROCHLORIDE 0.2 MCG/KG/HR: 400 INJECTION INTRAVENOUS at 23:26

## 2021-09-15 RX ADMIN — ZIPRASIDONE MESYLATE 20 MG: 20 INJECTION, POWDER, LYOPHILIZED, FOR SOLUTION INTRAMUSCULAR at 10:32

## 2021-09-15 NOTE — H&P
Hospital Medicine History & Physical      PCP: No primary care provider on file. Date of Admission: 9/15/2021    Date of Service: Pt seen/examined on 9/15/2021 and Placed in Observation. Chief Complaint:  Found lying on sidewalk \"flopping around\"      History Of Present Illness:      31 y.o. male with PMHx of ADHD, IVDA and Hep C presented to [facility] in transfer from [facility] ED for management of polysubstance overdose. Patient was found lying on the sidewalk flopping around by EMS. He was transported to [address] [place] [address] emergency department. He has known history of IV drug abuse with fentanyl and heroin. He reported last used Wednesday night. Urine drug screen positive for amphetamine and benzodiazepine. Patient has been uncooperative and required Geodon and Haldol in the emergency department. On arrival to ICU patient fighting with ICU staff interfering with medical treatment. Sedation required. HPI was obtained through chart review. Patient was in the emergency department at [facility] 2 nights ago for drug overdose. Past Medical History:          Diagnosis Date    ADHD (attention deficit hyperactivity disorder)     Cyst     testicles    Depression     Drug abuse, IV ([address]     heroine    Hemorrhoids     Hepatitis C     Heroin use        Past Surgical History:          Procedure Laterality Date    NASAL SEPTUM SURGERY      TONSILLECTOMY      TYMPANOSTOMY TUBE PLACEMENT         Medications Prior to Admission:      Prior to Admission medications    Medication Sig Start Date End Date Taking? Authorizing Provider   ibuprofen (IBU) 400 MG tablet Take 1 tablet by mouth every 6 hours as needed for Pain 2/86/19   Nikhil Raymond MD       Allergies:  Clindamycin, Clindamycin/lincomycin, and Azithromycin    Social History:        TOBACCO:   reports that he has been smoking cigarettes. He has a 6.00 pack-year smoking history. His smokeless tobacco use includes chew.   ETOH:   reports no history of alcohol use. Family History:      Reviewed in detail positive as follows:        Problem Relation Age of Onset    Diabetes Mother     Depression Mother     Substance Abuse Mother     High Blood Pressure Maternal Grandmother     Mental Illness Father     Substance Abuse Brother        REVIEW OF SYSTEMS:   Pertinent positives as noted in the HPI. All other systems reviewed and negative. PHYSICAL EXAM PERFORMED:    BP (!) 143/76   Pulse 79   Temp 98 °F (36.7 °C) (Axillary)   Resp 28   Ht 6' 1\" (1.854 m)   Wt 136 lb 11 oz (62 kg)   SpO2 94%   BMI 18.03 kg/m²     General appearance:  Well developed, well nourished,  male lying in hospital bed sedated to protect himself and staff. HEENT:  Normal cephalic, atraumatic without obvious deformity. Pupils equal, round, and reactive to light. Conjunctivae/corneas clear. Neck: Supple, with full range of motion. No jugular venous distention. Trachea midline. Respiratory:  Normal respiratory effort. Clear to auscultation, bilaterally without accessory muscle use. Cardiovascular:  Regular rate and rhythm, no lower extremity edema. Abdomen: Soft, non-tender, non-distended, without rebound or guarding. Normal bowel sounds. Musculoskeletal:  Moves all extremities equally. Full range of motion without deformity. Skin: Skin warm, dry and intact. No rashes or lesions. Neurologic: Currently sedated on Precedex  Psychiatric: Agitated, uncooperative and aggressive on arrival.  Currently sedated.   Capillary Refill: Brisk,< 3 seconds   Peripheral Pulses: +2 palpable, equal bilaterally       Labs:     Recent Labs     09/15/21  1130   WBC 11.4*   HGB 14.4   HCT 42.3        Recent Labs     09/15/21  1130      K 4.5      CO2 22   BUN 16   CREATININE 0.7*   CALCIUM 10.3     Recent Labs     09/15/21  1130   AST 71*   ALT 71*   BILITOT 0.6   ALKPHOS 128     Recent Labs     09/15/21  1400   INR 1.30*     Recent Labs 09/15/21  1130   TROPONINI <0.01       Urinalysis:      Lab Results   Component Value Date    NITRU Negative 09/15/2021    WBCUA None seen 09/15/2021    BACTERIA 1+ 11/20/2013    RBCUA None seen 09/15/2021    BLOODU Negative 09/15/2021    SPECGRAV >=1.030 09/15/2021    GLUCOSEU Negative 09/15/2021       Radiology:     EKG:  I have reviewed the EKG with the following interpretation: Normal sinus rhythm, rate 70. No acute ST elevation    XR CHEST 1 VIEW   Final Result   No acute process. CT head without contrast   Final Result   No acute intracranial abnormality. ASSESSMENT:    Active Hospital Problems    Diagnosis Date Noted    Encephalopathy [G93.40] 09/15/2021         PLAN:    Polysubstance abuse  - amphetamine and benzodiazepine + on UDP  - hx of IVDU daily with opiates, Fentaynl is drug of choice  - supportive care  - tylenol and asa level normal  - CK pending  - oxygen prn keep saturation> 92%  - zofran prn nausea/vomiting  - social service consult for drug treatment options  - Versed IV prn agitation  - despite versed prn pt continues to thrash body and kick at restraints started on precedex gtt       DVT Prophylaxis: Lovenox  Diet: NPO adv as tolerated  Code Status: Full Code    Dispo - Observation       [address], APRN - CNP    Thank you No primary care provider on file. for the opportunity to be involved in this patient's care. If you have any questions or concerns please feel free to contact me at [phone].

## 2021-09-15 NOTE — ED TRIAGE NOTES
Presents to ED by squad. Pt was found on sidewalk flailing and flopping around and squad was called. Pt alert but altered. KIcking, thrashing and flailing around bed.

## 2021-09-15 NOTE — ED PROVIDER NOTES
CHIEF COMPLAINT  Altered Mental Status      HISTORY OF PRESENT ILLNESS  Mack Talamantes is a 31 y.o. male who  has a past medical history of ADHD (attention deficit hyperactivity disorder), Cyst, Depression, Drug abuse, IV ([address] Hemorrhoids, Hepatitis C, and Heroin use. presents to the ED with EMS after being found on the sidewalk laying on the ground \"flopping around\". Patient arrives with EMS alert but altered and flailing around on the bed. Patient with known history of of fentanyl and heroin use. Patient states he last used fentanyl last night. Patient denies any alcohol use. Denies any other drug use except for fentanyl. History limited from patient secondary to mental status changes. No signs of trauma to the head or neck. Chart review shows the patient was in [facility] emergency room 2 nights ago for overdose of unknown drug but patient thought it was fentanyl. EMS denies giving the patient any medications prior to coming to the emergency room. States his vitals have been stable throughout his transport. No other complaints, modifying factors or associated symptoms. Nursing notes reviewed.    Past Medical History:   Diagnosis Date    ADHD (attention deficit hyperactivity disorder)     Cyst     testicles    Depression     Drug abuse, IV ([address]     heroine    Hemorrhoids     Hepatitis C     Heroin use      Past Surgical History:   Procedure Laterality Date    NASAL SEPTUM SURGERY      TONSILLECTOMY      TYMPANOSTOMY TUBE PLACEMENT       Family History   Problem Relation Age of Onset    Diabetes Mother     Depression Mother     Substance Abuse Mother     High Blood Pressure Maternal Grandmother     Mental Illness Father     Substance Abuse Brother      Social History     Socioeconomic History    Marital status: Single     Spouse name: Not on file    Number of children: Not on file    Years of education: Not on file    Highest education level: Not on file   Occupational History    Occupation: unemployed   Tobacco Use    Smoking status: Current Every Day Smoker     Packs/day: 1.00     Years: 6.00     Pack years: 6.00     Types: Cigarettes    Smokeless tobacco: Current User     Types: Chew   Vaping Use    Vaping Use: Never used   Substance and Sexual Activity    Alcohol use: No     Comment: social    Drug use: Yes     Frequency: 7.0 times per week     Types: Marijuana, IV, Opiates      Comment: fentynal    Sexual activity: Yes   Other Topics Concern    Not on file   Social History Narrative    Not on file     Social Determinants of Health     Financial Resource Strain:     Difficulty of Paying Living Expenses:    Food Insecurity:     Worried About Running Out of Food in the Last Year:     Ran Out of Food in the Last Year:    Transportation Needs:     Lack of Transportation (Medical):  Lack of Transportation (Non-Medical):    Physical Activity:     Days of Exercise per Week:     Minutes of Exercise per Session:    Stress:     Feeling of Stress :    Social Connections:     Frequency of Communication with Friends and Family:     Frequency of Social Gatherings with Friends and Family:     Attends [employer] Services:     Active Member of Clubs or Organizations:     Attends Club or Organization Meetings:     Marital Status:    Intimate Partner Violence:     Fear of Current or Ex-Partner:     Emotionally Abused:     Physically Abused:     Sexually Abused:      No current facility-administered medications for this encounter.      Current Outpatient Medications   Medication Sig Dispense Refill    ibuprofen (IBU) 400 MG tablet Take 1 tablet by mouth every 6 hours as needed for Pain 120 tablet 0     Allergies   Allergen Reactions    Clindamycin     Clindamycin/Lincomycin Itching    Azithromycin Itching         REVIEW OF SYSTEMS  10 systems reviewed, pertinent positives per HPI otherwise noted to be negative    PHYSICAL EXAM  BP (!) 140/86   Pulse 66   Temp 97.6 °F (36.4 °C) (Oral)   Resp 22   Wt 137 lb 12.8 oz (62.5 kg)   SpO2 100%   BMI 18.69 kg/m²      CONSTITUTIONAL:  Alert, rolling around the bed and moaning, occasionally nods yes and no appropriately to questions   HEAD: normocephalic, atraumatic   EYES: PERRL, EOMI, anicteric sclera, pupils 5 mm and reactive bilaterally   ENT: Moist mucous membranes, uvula midline, TMs normal bilaterally, no evidence of basilar skull fracture   NECK: Supple, symmetric, trachea midline, no stridor   BACK: Symmetric, no deformity, no step-offs   LUNGS: Bilateral breath sounds, CTAB, no rales/ronchi/wheezes   CARDIOVASCULAR: RRR, normal S1/S2, no m/r/g, 2+ pulses throughout   ABDOMEN: Soft, non-tender, non-distended, +BS   NEUROLOGIC:  MAEx4, altered but occasionally answers questions appropriately, flailing around the bed, kicking his feet, difficulty redirect   MUSCULOSKELETAL: No clubbing, cyanosis or edema   SKIN: No rash, abrasions on knees bilaterally         RADIOLOGY  X-RAYS:  I have reviewed radiologic plain film image(s). ALL OTHER NON-PLAIN FILM IMAGES SUCH AS CT, ULTRASOUND AND MRI HAVE BEEN READ BY THE RADIOLOGIST. XR CHEST 1 VIEW   Final Result   No acute process. CT head without contrast   Final Result   No acute intracranial abnormality. EKG INTERPRETATION  Normal sinus rhythm with a rate of 70, normal axis, , QRS 94, QTc 406, no ST elevations, nonspecific ST changes    PROCEDURES    ED COURSE/MDM  Ingestion, infectious, trauma, electrolytes, encephalopathy, opiates, uremia, toxins, tumor, thyrotoxicosis, psychiatric, sepsis, N/V, seizure, alcohol / drugs     Patient seen and evaluated. History and physical as above. Patient altered on arrival,  history limited secondary to patient's mental status change. No signs of trauma to the head neck or scalp. Patient occasionally answers questions appropriately with yes and no moans. We will proceed with altered mental status work-up.   Possibly amphetamines and benzodiazepines on board as well as having some yeast in the urine. Discussed that patient's vital signs are stable and he is tolerating room air but with his acute encephalopathy will need to be monitored overnight. Admission was accepted. [DS]      ED Course User Index  [DS] Mattie Carvajal MD         Patient was given scripts for the following medications. I counseled patient how to take these medications. New Prescriptions    No medications on file     CRITICAL CARE TIME   Total Critical Care time was 40 minutes, excluding separately reportable procedures. There was a high probability of clinically significant/life threatening deterioration in the patient's condition which required my urgent intervention. CLINICAL IMPRESSION  1. Acute encephalopathy    2. Elevated liver enzymes    3. Polysubstance abuse ([address]    4. Yeast cystitis        Blood pressure (!) 140/86, pulse 66, temperature 97.6 °F (36.4 °C), temperature source Oral, resp. rate 22, weight 137 lb 12.8 oz (62.5 kg), SpO2 100 %. DISPOSITION  Admitted    Disclaimer: All medical record entries made by [address] [place] [address] [address] sonia.       (Please note that this note was completed with a voice recognition program. Every attempt was made to edit the dictations, but inevitably there remain words that are mis-transcribed.)            Mattie Carvajal MD  09/15/21 [phone]

## 2021-09-16 LAB
ANION GAP SERPL CALCULATED.3IONS-SCNC: 16 MMOL/L (ref 3–16)
BUN BLDV-MCNC: 26 MG/DL (ref 7–20)
CALCIUM SERPL-MCNC: 10.1 MG/DL (ref 8.3–10.6)
CHLORIDE BLD-SCNC: 105 MMOL/L (ref 99–110)
CO2: 21 MMOL/L (ref 21–32)
CREAT SERPL-MCNC: 1 MG/DL (ref 0.9–1.3)
GFR AFRICAN AMERICAN: >60
GFR NON-AFRICAN AMERICAN: >60
GLUCOSE BLD-MCNC: 122 MG/DL (ref 70–99)
HCT VFR BLD CALC: 41.2 % (ref 40.5–52.5)
HEMOGLOBIN: 14.1 G/DL (ref 13.5–17.5)
MAGNESIUM: 2.4 MG/DL (ref 1.8–2.4)
MCH RBC QN AUTO: 27.8 PG (ref 26–34)
MCHC RBC AUTO-ENTMCNC: 34.2 G/DL (ref 31–36)
MCV RBC AUTO: 81.3 FL (ref 80–100)
PDW BLD-RTO: 14.1 % (ref 12.4–15.4)
PHOSPHORUS: 4 MG/DL (ref 2.5–4.9)
PLATELET # BLD: 301 K/UL (ref 135–450)
PMV BLD AUTO: 7.8 FL (ref 5–10.5)
POTASSIUM REFLEX MAGNESIUM: 4.2 MMOL/L (ref 3.5–5.1)
RBC # BLD: 5.07 M/UL (ref 4.2–5.9)
SODIUM BLD-SCNC: 142 MMOL/L (ref 136–145)
TOTAL CK: 1311 U/L (ref 39–308)
WBC # BLD: 16 K/UL (ref 4–11)

## 2021-09-16 PROCEDURE — 83735 ASSAY OF MAGNESIUM: CPT

## 2021-09-16 PROCEDURE — 85027 COMPLETE CBC AUTOMATED: CPT

## 2021-09-16 PROCEDURE — 82550 ASSAY OF CK (CPK): CPT

## 2021-09-16 PROCEDURE — 6360000002 HC RX W HCPCS: Performed by: NURSE PRACTITIONER

## 2021-09-16 PROCEDURE — 1200000000 HC SEMI PRIVATE

## 2021-09-16 PROCEDURE — 84100 ASSAY OF PHOSPHORUS: CPT

## 2021-09-16 PROCEDURE — G0378 HOSPITAL OBSERVATION PER HR: HCPCS

## 2021-09-16 PROCEDURE — 2580000003 HC RX 258: Performed by: NURSE PRACTITIONER

## 2021-09-16 PROCEDURE — 2500000003 HC RX 250 WO HCPCS: Performed by: INTERNAL MEDICINE

## 2021-09-16 PROCEDURE — 6370000000 HC RX 637 (ALT 250 FOR IP): Performed by: NURSE PRACTITIONER

## 2021-09-16 PROCEDURE — 80048 BASIC METABOLIC PNL TOTAL CA: CPT

## 2021-09-16 PROCEDURE — 6360000002 HC RX W HCPCS: Performed by: INTERNAL MEDICINE

## 2021-09-16 PROCEDURE — 36415 COLL VENOUS BLD VENIPUNCTURE: CPT

## 2021-09-16 RX ADMIN — DEXMEDETOMIDINE HYDROCHLORIDE 0.5 MCG/KG/HR: 400 INJECTION INTRAVENOUS at 10:08

## 2021-09-16 RX ADMIN — Medication 10 ML: at 21:29

## 2021-09-16 RX ADMIN — LORAZEPAM 2 MG: 2 INJECTION INTRAMUSCULAR; INTRAVENOUS at 21:33

## 2021-09-16 RX ADMIN — ENOXAPARIN SODIUM 40 MG: 40 INJECTION SUBCUTANEOUS at 15:00

## 2021-09-16 RX ADMIN — Medication 10 ML: at 09:05

## 2021-09-16 RX ADMIN — Medication 3 MG: at 21:29

## 2021-09-16 RX ADMIN — MIDAZOLAM 2 MG: 1 INJECTION INTRAMUSCULAR; INTRAVENOUS at 03:18

## 2021-09-16 RX ADMIN — DEXMEDETOMIDINE HYDROCHLORIDE 0.7 MCG/KG/HR: 400 INJECTION INTRAVENOUS at 21:39

## 2021-09-16 ASSESSMENT — PAIN SCALES - GENERAL
PAINLEVEL_OUTOF10: 0

## 2021-09-16 NOTE — PROGRESS NOTES
I was made aware that this patient was in 5 point soft restraints. Devcornell Listen will be made aware via e-mail.

## 2021-09-16 NOTE — PROGRESS NOTES
Hospitalist Progress Note      PCP: No primary care provider on file. Chief Complaint. Presented to hospital for drug overdose    Date of Admission: 9/15/2021      Subjective: No acute events reported overnight    Medications:  Reviewed    Infusion Medications    sodium chloride      dexmedetomidine 0.5 mcg/kg/hr (09/16/21 1008)     Scheduled Medications    sodium chloride flush  5-40 mL IntraVENous 2 times per day    enoxaparin  40 mg SubCUTAneous Daily    melatonin  3 mg Oral Nightly     PRN Meds: sodium chloride flush, sodium chloride, ondansetron **OR** ondansetron, nicotine, dicyclomine, traZODone, midazolam, ziprasidone **AND** sterile water, LORazepam      Intake/Output Summary (Last 24 hours) at 9/16/2021 1731  Last data filed at 9/16/2021 1200  Gross per 24 hour   Intake 960 ml   Output 690 ml   Net 270 ml       Physical Exam Performed:    /89   Pulse 126   Temp 97.8 °F (36.6 °C) (Axillary)   Resp (!) 36   Ht 6' 1\" (1.854 m)   Wt 136 lb 11 oz (62 kg)   SpO2 100%   BMI 18.03 kg/m²     General appearance: He seems lethargic, not following commands, not holding conversations  HEENT:  Conjunctivae/corneas clear. Neck: Supple, with full range of motion. Respiratory:  Normal respiratory effort. Clear to auscultation, bilaterally without Rales/Wheezes/Rhonchi. Cardiovascular: Regular rate and rhythm with normal S1/S2 without murmurs or rubs  Abdomen: Soft, non-tender, non-distended, normal bowel sounds.   Musculoskeletal: No cyanosis or edema bilaterally  Neurologic: Not following commands or holding conversations  Psychiatric: Alert and oriented x0, Normal mood  Peripheral Pulses: +2 palpable, equal bilaterally       Labs:   Recent Labs     09/15/21  1130 09/16/21  0503   WBC 11.4* 16.0*   HGB 14.4 14.1   HCT 42.3 41.2    301     Recent Labs     09/15/21  1130 09/16/21  0503    142   K 4.5 4.2    105   CO2 22 21   BUN 16 26*   CREATININE 0.7* 1.0   CALCIUM 10.3 10.1   PHOS  --  4.0     Recent Labs     09/15/21  1130   AST 71*   ALT 71*   BILITOT 0.6   ALKPHOS 128     Recent Labs     09/15/21  1400   INR 1.30*     Recent Labs     09/15/21  1130 09/16/21  0503   CKTOTAL  --  1,311*   TROPONINI <0.01  --        Urinalysis:      Lab Results   Component Value Date    NITRU Negative 09/15/2021    WBCUA None seen 09/15/2021    BACTERIA 1+ 11/20/2013    RBCUA None seen 09/15/2021    BLOODU Negative 09/15/2021    SPECGRAV >=1.030 09/15/2021    GLUCOSEU Negative 09/15/2021       Radiology:  XR CHEST 1 VIEW   Final Result   No acute process. CT head without contrast   Final Result   No acute intracranial abnormality. Assessment/Plan:    Active Hospital Problems    Diagnosis     Encephalopathy [G93.40]       patient is a 21-year-old male with past medical history of ADHD, IVDA, hepatitis C who presented to hospital from [facility] ED for overdose with polysubstance abuse.     Assessment  Overdose likely ketamine, benzodiazepines  Rhabdomyolysis    Plan  Continue Precedex, wean as tolerated, started on Ativan as needed agitation, midazolam as needed agitation, nicotine patch, consulted   Continue DVT prophylaxis with Lovenox  Diet: No diet orders on file  Code Status: Full Code    PT/OT Eval Status: ordered    Dispo -he is on Precedex in ICU, will transfer to ICU once mental status improves    Rocky Hearn MD

## 2021-09-16 NOTE — PROGRESS NOTES
Late Entries d/t pt care  2023-New admit from [facility] ED for Drug overdose and AMS. Placed on bedside monitor, HR NSR-ST. Pt minimally responsive, occasionally opens eyes but does not follow commands. Thrashing and kicking in bed nearly continuously. See Flowsheets for full admission documentation. Seizure pads placed on bed for safety given constant kicking of legs/thrashing. 2033-Perfect Serve sent to Dr. London Dixon regarding PRN medications for agitation. Discussed Precedex gtt, but concern for bradycardia given HR in 60s while at rest.  Orders for PRN Versed received. 2048-PT attempting to sit up in bed and turn as if to flip over side rail, despite 2 RN at bedside providing redirection. Pt unable to remain alert. Orders from Dr. London Dixon for Screven restraint and Goodson placement. 1mg PRN Versed given. Pt continues to thrash in bed and kick legs constantly. 2055-Attempting to place Goodson, pt kicking and thrashing. Bilateral soft ankle restraints placed for safety of pt and staff. Order received from Dr. London Dixon for restraint placement. 2100-Following goodson placement, pt more alert, able to answer all orientation questions correctly. Pt is not consistently alert, mental status fluctuating between thrashing and incoherent to alert and oriented. Even while oriented, pt continues to thrash legs and twist in bed, stating \"I can't stop my body\". Explained to pt need for restraints. Pt verbalized understanding, but continues to pull and kick at restraints. Ask pt if RN can call pt emergency contact Buddy Baird, pt uncle. Pt states \"You can call, but don't tell him why I am here, he doesn't know I do drugs\" Asked pt what he would like RN to tell Buddy Baird, pt states \"Nevermind, just don't call\". Pt continues to thrash in bed.    2127-Perfect Serve sent to Dr. London Dixon regarding lack of success with PRN Versed and need for further orders. Order for PRN Ativan and Geodon if needed.      2151-Pt attempting to pull on Mathias, attempting to turn in bed and pull at equipment. Unable to be redirected. Order received from Dr. Karina Davey for bilateral soft wrist restraints. 2210-Pt continues with agitation, unable to be redirected. Pt shaking whole bed with thrashing. PRN Geodon given. 2215-Following Geodon administration, pt more alert, states \"What was that? \" Explained pt received Geodon injection to reduce agitation. Pt states \"That won't work. Only Fentanyl works\". Explained that pt cannot be given, as he states he abuses Fentanyl outpatient. 2304-Following Geodon, pt will have a few minutes of rest, then wakes up and thrashes forcefully. Perfect Serve sent to Dr. Karina Davey regarding continued agitation. HR now 80s-100. Order to start Precedex and titrate slowly to monitor for bradycardia. 2326-Precedex gtt started, see MAR for titrations. 0000-Pt continues with fluctuating mental status. Will have periods of calm/rest with HR 80s, followed by periods of thrashing. Minimally conversive at this time. Does not answer orientation questions. Pt following commands sporadically. Moves all extremities, pulling against restraints. Remains in vest, wrist, and ankle restraints. 0025-Precedex gtt titrated. Soo Jara NP at bedside to evaluate pt. Discuss periods of lucidity, agitation, restraints, and agitation medications given. Will continue to titrate Precedex to reduce agitation. No new orders at this time. 0600-Throughout night, pt with increased clarity while increasing Precedex. Pt continues to thrash in bed, but less frequent and more coherent in conversation. 0733-Report given to Ventura Balderas. Handoff completed.

## 2021-09-17 LAB
ANION GAP SERPL CALCULATED.3IONS-SCNC: 21 MMOL/L (ref 3–16)
BUN BLDV-MCNC: 49 MG/DL (ref 7–20)
CALCIUM SERPL-MCNC: 9.6 MG/DL (ref 8.3–10.6)
CHLORIDE BLD-SCNC: 101 MMOL/L (ref 99–110)
CO2: 19 MMOL/L (ref 21–32)
CREAT SERPL-MCNC: 1 MG/DL (ref 0.9–1.3)
GFR AFRICAN AMERICAN: >60
GFR NON-AFRICAN AMERICAN: >60
GLUCOSE BLD-MCNC: 128 MG/DL (ref 70–99)
HCT VFR BLD CALC: 42.5 % (ref 40.5–52.5)
HEMOGLOBIN: 14.9 G/DL (ref 13.5–17.5)
MAGNESIUM: 2.7 MG/DL (ref 1.8–2.4)
MCH RBC QN AUTO: 28.4 PG (ref 26–34)
MCHC RBC AUTO-ENTMCNC: 35 G/DL (ref 31–36)
MCV RBC AUTO: 81 FL (ref 80–100)
PDW BLD-RTO: 14.1 % (ref 12.4–15.4)
PHOSPHORUS: 5.7 MG/DL (ref 2.5–4.9)
PLATELET # BLD: 274 K/UL (ref 135–450)
PMV BLD AUTO: 8 FL (ref 5–10.5)
POTASSIUM SERPL-SCNC: 3.7 MMOL/L (ref 3.5–5.1)
RBC # BLD: 5.25 M/UL (ref 4.2–5.9)
SODIUM BLD-SCNC: 141 MMOL/L (ref 136–145)
WBC # BLD: 15.3 K/UL (ref 4–11)

## 2021-09-17 PROCEDURE — 6370000000 HC RX 637 (ALT 250 FOR IP): Performed by: NURSE PRACTITIONER

## 2021-09-17 PROCEDURE — 94761 N-INVAS EAR/PLS OXIMETRY MLT: CPT

## 2021-09-17 PROCEDURE — 83735 ASSAY OF MAGNESIUM: CPT

## 2021-09-17 PROCEDURE — 2580000003 HC RX 258: Performed by: INTERNAL MEDICINE

## 2021-09-17 PROCEDURE — 85027 COMPLETE CBC AUTOMATED: CPT

## 2021-09-17 PROCEDURE — 1200000000 HC SEMI PRIVATE

## 2021-09-17 PROCEDURE — 6360000002 HC RX W HCPCS: Performed by: NURSE PRACTITIONER

## 2021-09-17 PROCEDURE — 2500000003 HC RX 250 WO HCPCS: Performed by: INTERNAL MEDICINE

## 2021-09-17 PROCEDURE — 97530 THERAPEUTIC ACTIVITIES: CPT

## 2021-09-17 PROCEDURE — 97166 OT EVAL MOD COMPLEX 45 MIN: CPT

## 2021-09-17 PROCEDURE — 92610 EVALUATE SWALLOWING FUNCTION: CPT

## 2021-09-17 PROCEDURE — 80048 BASIC METABOLIC PNL TOTAL CA: CPT

## 2021-09-17 PROCEDURE — 2580000003 HC RX 258: Performed by: NURSE PRACTITIONER

## 2021-09-17 PROCEDURE — 6360000002 HC RX W HCPCS: Performed by: INTERNAL MEDICINE

## 2021-09-17 PROCEDURE — 84100 ASSAY OF PHOSPHORUS: CPT

## 2021-09-17 PROCEDURE — 97161 PT EVAL LOW COMPLEX 20 MIN: CPT

## 2021-09-17 PROCEDURE — 97110 THERAPEUTIC EXERCISES: CPT

## 2021-09-17 PROCEDURE — 36415 COLL VENOUS BLD VENIPUNCTURE: CPT

## 2021-09-17 RX ORDER — SODIUM CHLORIDE 9 MG/ML
INJECTION, SOLUTION INTRAVENOUS CONTINUOUS
Status: DISCONTINUED | OUTPATIENT
Start: 2021-09-17 | End: 2021-09-18 | Stop reason: HOSPADM

## 2021-09-17 RX ORDER — 0.9 % SODIUM CHLORIDE 0.9 %
500 INTRAVENOUS SOLUTION INTRAVENOUS ONCE
Status: COMPLETED | OUTPATIENT
Start: 2021-09-17 | End: 2021-09-17

## 2021-09-17 RX ADMIN — LORAZEPAM 2 MG: 2 INJECTION INTRAMUSCULAR; INTRAVENOUS at 22:48

## 2021-09-17 RX ADMIN — DEXMEDETOMIDINE HYDROCHLORIDE 0.8 MCG/KG/HR: 400 INJECTION INTRAVENOUS at 17:39

## 2021-09-17 RX ADMIN — SODIUM CHLORIDE: 9 INJECTION, SOLUTION INTRAVENOUS at 05:32

## 2021-09-17 RX ADMIN — ENOXAPARIN SODIUM 40 MG: 40 INJECTION SUBCUTANEOUS at 08:59

## 2021-09-17 RX ADMIN — SODIUM CHLORIDE 500 ML: 9 INJECTION, SOLUTION INTRAVENOUS at 04:24

## 2021-09-17 RX ADMIN — DEXMEDETOMIDINE HYDROCHLORIDE 0.7 MCG/KG/HR: 400 INJECTION INTRAVENOUS at 07:35

## 2021-09-17 RX ADMIN — Medication 10 ML: at 21:15

## 2021-09-17 RX ADMIN — Medication 3 MG: at 21:10

## 2021-09-17 RX ADMIN — SODIUM CHLORIDE: 9 INJECTION, SOLUTION INTRAVENOUS at 15:25

## 2021-09-17 ASSESSMENT — PAIN SCALES - GENERAL
PAINLEVEL_OUTOF10: 0

## 2021-09-17 NOTE — ACP (ADVANCE CARE PLANNING)
Advance Care Planning     Advance Care Planning Activator (Inpatient)  Conversation Note      Date of ACP Conversation: 2021     Conversation Conducted with: Patient with Decision Making Capacity    ACP Activator: Lizzy Rivera RN      Health Care Decision Maker:     Current Designated Health Care Decision Maker:     Primary Decision Maker: Martín Allen - Brother/Sister - [phone]    Primary Decision Maker: Alka Novak - Brother/Sister  Click here to [address] including section of the [address] Relationship (ie \"Primary\")  Today we documented Decision Maker(s) consistent with Legal Next of Kin hierarchy. Both parents , not , no adult children. He has 4 brothers. Care Preferences    Ventilation: \"If you were in your present state of health and suddenly became very ill and were unable to breathe on your own, what would your preference be about the use of a ventilator (breathing machine) if it were available to you? \"      Would the patient desire the use of ventilator (breathing machine)?: yes    \"If your health worsens and it becomes clear that your chance of recovery is unlikely, what would your preference be about the use of a ventilator (breathing machine) if it were available to you? \"     Would the patient desire the use of ventilator (breathing machine)?: Yes- let my brothers decide      Resuscitation  \"CPR works best to restart the heart when there is a sudden event, like a heart attack, in someone who is otherwise healthy. Unfortunately, CPR does not typically restart the heart for people who have serious health conditions or who are very sick. \"    \"In the event your heart stopped as a result of an underlying serious health condition, would you want attempts to be made to restart your heart (answer \"yes\" for attempt to resuscitate) or would you prefer a natural death (answer \"no\" for do not attempt to resuscitate)? \" yes       [] Yes [x] No   Educated Patient / Tia Getting regarding differences between Advance Directives and portable DNR orders.     Length of ACP Conversation in minutes:  4    Conversation Outcomes:  [x] ACP discussion completed  [] Existing advance directive reviewed with patient; no changes to patient's previously recorded wishes  [] New Advance Directive completed  [] Portable Do Not Rescitate prepared for Provider review and signature  [] POLST/POST/MOLST/MOST prepared for Provider review and signature      Follow-up plan:    [] Schedule follow-up conversation to continue planning  [] Referred individual to Provider for additional questions/concerns   [] Advised patient/agent/surrogate to review completed ACP document and update if needed with changes in condition, patient preferences or care setting    [x] This note routed to one or more involved healthcare providers               Mery Burt RN, BSN, Case Management  Phone: [phone]  Electronically signed by Mery Burt RN on 9/17/2021 at 5:08 PM

## 2021-09-17 NOTE — PROGRESS NOTES
Occupational Therapy   Occupational Therapy Initial Assessment  Date: 2021   Patient Name: Julia Hardwick  MRN: [id number]     : 1995    Date of Service: 2021    Discharge Recommendations:  Continue to assess pending progress  OT Equipment Recommendations  Equipment Needed: No    Assessment   Performance deficits / Impairments: Decreased functional mobility ; Decreased safe awareness;Decreased balance;Decreased ADL status; Decreased high-level IADLs;Decreased endurance;Decreased cognition  Assessment: 32 y/o male admit 9/15/2021 with Polysubstance Abuse/Overdose. Pt to  ER 2 nights prior for Overdose. PMH as noted including IV Drug Abuse, Hep C, ADHD. Pt reports living with Uncle in 2 story house with steps to enter and 2nd floor bed/bath; independent daily care and functional mobility. Today, pt completed bed mobility and ADL transfers to [facility] with CGA. Pt slightly impulsive and required cues for safety. Anticipate pt will require CGA for ADLs at this time. pt returned to supine with 5 point restraints tied at end of session. Anticipate pt will make good progress to return home at discharge. Prognosis: Good  Decision Making: Medium Complexity  OT Education: Plan of Care;Transfer Training;OT Role  REQUIRES OT FOLLOW UP: Yes  Activity Tolerance  Activity Tolerance: Patient Tolerated treatment well  Safety Devices  Safety Devices in place: Yes  Type of devices: Nurse notified; Left in bed;Bed alarm in place;Call light within reach           Patient Diagnosis(es): The primary encounter diagnosis was Acute encephalopathy. Diagnoses of Elevated liver enzymes, Polysubstance abuse ([address] and Yeast cystitis were also pertinent to this visit. has a past medical history of ADHD (attention deficit hyperactivity disorder), Cyst, Depression, Drug abuse, IV ([address] Hemorrhoids, Hepatitis C, and Heroin use.   has a past surgical history that includes Nasal septum surgery;  Tonsillectomy; and Tympanostomy tube placement. Restrictions  Restrictions/Precautions  Restrictions/Precautions: Fall Risk  Position Activity Restriction  Other position/activity restrictions: H/O IV Drug Overdose : Currently Posey, UE/LE Restraints. Per Nurse : ok for brief eob activities. Subjective   General  Chart Reviewed: Yes  Patient assessed for rehabilitation services?: Yes  Additional Pertinent Hx: 30 y/o male admit 9/15/2021 with Polysubstance Abuse/Overdose. Pt to  ER 2 nights prior for Overdose. PMH as noted including IV Drug Abuse, Hep C, ADHD. Family / Caregiver Present: No  Referring Practitioner: Dr. Gabriele Marcos  Diagnosis: overdose  Subjective  Subjective: Pt seen bedside and agreeable to therapy. Pt slightly impulsive with OOB activities. Pt able to follow commands appropriately. General Comment  Comments: Per RN ok for therapy. Social/Functional History  Social/Functional History  Lives With: Family (Uncle.)  Type of Home: House  Home Layout: Two level, Bed/Bath upstairs  Home Access: Stairs to enter with rails (8 steps to enter)  Bathroom Shower/Tub: Tub/Shower unit  Bathroom Toilet: Standard  Bathroom Accessibility: Accessible  ADL Assistance: Independent  Ambulation Assistance: Independent  Transfer Assistance: Independent  Type of occupation: [employer] Objective   Vision: Within Functional Limits  Hearing: Within functional limits    Orientation  Overall Orientation Status: Within Functional Limits     Balance  Sitting Balance: Stand by assistance (EOB ~ 10 min)  Standing Balance: Contact guard assistance  Standing Balance  Time: stood briefly with prabha alonso  Functional Mobility  Functional Mobility Comments: defer ambulation per RN     ADL  Feeding: Setup (able to manage cup and bring to mouth- initial coughing and dripping from mouth.  RN aware)  Toileting: Dependent/Total (goodson)  Additional Comments: Anticipate pt will be min A for standing components of ADLs today based on balance and impulsiveness        Bed mobility  Supine to Sit: Contact guard assistance  Sit to Supine: Contact guard assistance  Transfers  Sit to stand: Contact guard assistance  Stand to sit: Contact guard assistance  Transfer Comments: to/from prabha alonso     Cognition  Overall Cognitive Status: Exceptions  Arousal/Alertness: Appropriate responses to stimuli  Following Commands: Follows one step commands with increased time  Safety Judgement: Decreased awareness of need for assistance;Decreased awareness of need for safety  Insights: Decreased awareness of deficits  Initiation: Requires cues for some  Sequencing: Requires cues for some  Perception  Overall Perceptual Status: WFL              LUE AROM (degrees)  LUE AROM : WFL  Left Hand AROM (degrees)  Left Hand AROM: WFL  RUE AROM (degrees)  RUE AROM : WFL  Right Hand AROM (degrees)  Right Hand AROM: WFL     Plan   Plan  Times per week: 3-5  Current Treatment Recommendations: Endurance Training, Strengthening, Balance Training, Gait Training, Functional Mobility Training, Self-Care / ADL    AM-PAC Score  AM-[facility] Inpatient Daily Activity Raw Score: 18 (09/17/21 1155)  AM-PAC Inpatient ADL T-Scale Score : 38.66 (09/17/21 1155)  ADL Inpatient CMS 0-100% Score: 46.65 (09/17/21 1155)  ADL Inpatient CMS G-Code Modifier : CK (09/17/21 1155)    Goals  Short term goals  Time Frame for Short term goals: Prior to DC:   Short term goal 1: Pt will complete ADL transfers/mobility with supervision  Short term goal 2: Pt will tolerate standing > 5 min for functional task with supervision  Short term goal 3: Pt will complete LB dressing with supervision  Short term goal 4: Pt will complete toileting with supervision  Patient Goals   Patient goals : \"to not do drugs any more\"       Therapy Time   Individual Concurrent Group Co-treatment   Time In 0800         Time Out 0840         Minutes 40         Timed Code Treatment Minutes: 25 Minutes     This note to serve as OT d/c summary if pt is d/c-ed prior to next therapy session.     Evelia Bolden, OTR/L

## 2021-09-17 NOTE — PROGRESS NOTES
Physician Progress Note      PATIENT:               Leila Dakins  CSN #:                  [id number]  :                       1995  ADMIT DATE:       9/15/2021 10:10 AM  DISCH DATE:  RESPONDING  PROVIDER #:        Kathy Roberts MD          QUERY TEXT:    Pt admitted with encephalopathy documented. If possible, please document in   progress notes and discharge summary further specificity regarding the type of   encephalopathy:    The medical record reflects the following:  Risk Factors: Polysubstance abuse  Clinical Indicators: per ED Provider \"Patient altered on arrival,  history   limited secondary to patient's mental status change. [place] \"  Treatment: In ED Geodon and Haldol IM, ICU Monitoring and Precedex infusion,   and 5 point restraints  Options provided:  -- Drug-induced encephalopathy due to Polysubstance abuse  -- Drug-induced encephalopathy due to, Please specify substance. -- Drug-induced encephalopathy, substance(s) unknown  -- Other - I will add my own diagnosis  -- Disagree - Not applicable / Not valid  -- Disagree - Clinically unable to determine / Unknown  -- Refer to Clinical Documentation Reviewer    PROVIDER RESPONSE TEXT:    This patient has drug-induced encephalopathy due to Polysubstance abuse .     Query created by: Will Stroud on 2021 8:49 AM      Electronically signed by:  Kathy Roberts MD 2021 1:47 PM

## 2021-09-17 NOTE — CARE COORDINATION
INITIAL CASE MANAGEMENT ASSESSMENT    Met with patient to assess possible discharge needs. Explained Case Management role/services. Living Situation: confirmed address, lives with uncle and uncle's partner in a 2 story home with 7 steps to enter and he has 2nd floor bed/bath    ADLs: independent     DME: none    PT/OT Recs: No needs anticipated     Active Services: none     Transportation: active , but does not have his license currently, his uncle transports     Medications: confirmed [place] and uses [facility] or [facility] in [place] without issues    PCP: does not have a PCP - appointment was made last visit and the patient did not follow up      HD/PD: N/A    PLAN/COMMENTS: Patient is agreeable to inpatient drug rehab. He is willing to go to any rehab that can accept him. He has lost many family and friends to Fentanyl and wants to get clean. Spoke with [facility] [place], , who will follow up with the patient in the morning as the patient remains on Precedex gtt at this time. Provided contact information for patient or family to call with any questions. Will follow and assist as needed.     Catherine Castro RN, BSN, Case Management  [phone]  Electronically signed by Catherine Castro RN on 9/17/2021 at 5:01 PM

## 2021-09-17 NOTE — PROGRESS NOTES
Physical Therapy    Facility/Department: [address] ICU  Initial Assessment    NAME: Luba Harris  : 1995  MRN: [id number]    Date of Service: 2021    Discharge Recommendations:  Continue to assess pending progress   PT Equipment Recommendations  Other: Will monitor for any equipt needs. Assessment   Body structures, Functions, Activity limitations: Decreased functional mobility ; Decreased safe awareness;Decreased endurance;Decreased balance  Assessment: 32 y/o male admit 9/15/2021 with Polysubstance Abuse/Overdose. Pt to  ER 2 nights prior for Overdose. PMH as noted including IV Drug Abuse, Hep C, ADHD. Pt reports living with Uncle in 2 story house with steps to enter and 2nd floor bed/bath; independent daily care and functional mobility. Do not anticipate need cont PT Services upon d/c. Will monitor pt's progress. Prognosis: Fair;Good  Decision Making: Low Complexity  History: 32 y/o male admit 9/15/2021 with Polysubstance Abuse/Overdose. Pt to  ER 2 nights prior for Overdose. PMH as noted including IV Drug Abuse, Hep C, ADHD. Exam: See above. Clinical Presentation: See above. Patient Education: Role of PT, POC, Need to call for assist.  Barriers to Learning: Safety awareness, alittle impulsive at times. REQUIRES PT FOLLOW UP: Yes  Activity Tolerance  Activity Tolerance: Patient Tolerated treatment well  Activity Tolerance: Limited due to current safety concerns. Patient Diagnosis(es): The primary encounter diagnosis was Acute encephalopathy. Diagnoses of Elevated liver enzymes, Polysubstance abuse ([address] and Yeast cystitis were also pertinent to this visit. has a past medical history of ADHD (attention deficit hyperactivity disorder), Cyst, Depression, Drug abuse, IV ([address] Hemorrhoids, Hepatitis C, and Heroin use.   has a past surgical history that includes Nasal septum surgery;  Tonsillectomy; and Tympanostomy tube placement. Restrictions  Restrictions/Precautions  Restrictions/Precautions: Fall Risk  Position Activity Restriction  Other position/activity restrictions: H/O IV Drug Overdose : Currently Posey, UE/LE Restraints. Per Nurse : ok for brief eob activities. Vision/Hearing  Vision: Within Functional Limits  Hearing: Within functional limits     Subjective  General  Chart Reviewed: Yes  Patient assessed for rehabilitation services?: Yes  Additional Pertinent Hx: 30 y/o male admit 9/15/2021 with Polysubstance Abuse/Overdose. Pt to  ER 2 nights prior for Overdose. PMH as noted including IV Drug Abuse, Hep C, ADHD. Family / Caregiver Present: No  Referring Practitioner: Dr. Katya Ortega  Diagnosis: Polysubstance Abuse/Overdose. Other (Comment): Pt follows simple commands. Subjective  Subjective: Pt agreeable to PT Eval/Rx. Requests something to drink. Pain Screening  Patient Currently in Pain: Denies          Orientation  Orientation  Overall Orientation Status: Within Functional Limits  Social/Functional History  Social/Functional History  Lives With: Family (Uncle.)  Type of Home: House  Home Layout: Two level, Bed/Bath upstairs  Home Access: Stairs to enter with rails (8 steps to enter)  Bathroom Shower/Tub: Tub/Shower unit  Bathroom Toilet: Standard  Bathroom Accessibility: Accessible  ADL Assistance: Independent  Ambulation Assistance: Independent  Transfer Assistance: Independent  Type of occupation: [employer]. Cognition   Cognition  Overall Cognitive Status: Exceptions  Arousal/Alertness: Appropriate responses to stimuli  Following Commands:  Follows one step commands with increased time  Safety Judgement: Decreased awareness of need for assistance;Decreased awareness of need for safety  Insights: Decreased awareness of deficits  Initiation: Requires cues for some  Sequencing: Requires cues for some    Objective          AROM RLE (degrees)  RLE AROM: WFL  AROM LLE (degrees)  LLE AROM : WFL  AROM RUE (degrees)  RUE AROM : WFL  AROM LUE (degrees)  LUE AROM : WFL  Strength RLE  Strength RLE: WFL  Strength LLE  Strength LLE: WFL  Strength RUE  Strength RUE: WFL  Strength LUE  Strength LUE: WFL        Bed mobility  Supine to Sit: Contact guard assistance  Sit to Supine: Contact guard assistance  Transfers  Sit to Stand: Contact guard assistance ([address]  Stand to sit: Contact guard assistance ([address]  Comment: Defer oob to chair due to safety concerns at this time. Ambulation  Ambulation?: No     Balance  Comments: EOB : SBA/CGA; alittle impulsive at times. Stand via [place] CGA. Pt able to lift each LE while stand CGA. Pt sat at EOB ~ 10 min SBA. Pt able to lift/manage use of Cup with assist.  (Cues for small/slow sips)        Plan   Plan  Times per week: 3-5x week while in acute care setting. Current Treatment Recommendations: Balance Training, Functional Mobility Training, Transfer Training, Gait Training, Stair training, Safety Education & Training, Patient/Caregiver Education & Training  Safety Devices  Type of devices: Bed alarm in place, Call light within reach, Left in bed, Nurse notified  Restraints  Initially in place: Yes  Restraints: Posey, B UE/LE Soft Restraints. AM-PAC Score  AM-PAC Inpatient Mobility Raw Score : 18 (09/17/21 1055)  AM-PAC Inpatient T-Scale Score : 43.63 (09/17/21 1055)  Mobility Inpatient CMS 0-100% Score: 46.58 (09/17/21 1055)  Mobility Inpatient CMS G-Code Modifier : CK (09/17/21 1055)          Goals  Short term goals  Time Frame for Short term goals: Upon d/c acute care setting. Short term goal 1: Bed Mob Independent. Short term goal 2: Transfers with/without assist device Supervision. Short term goal 3: Amb with/without assist device 200' Supervision. Patient Goals   Patient goals : Go home.        Therapy Time   Individual Concurrent Group Co-treatment   Time In 0800         Time Out 0840         Minutes [address] [facility] [place], [place]

## 2021-09-17 NOTE — PROGRESS NOTES
Pt's brother, Johanna Diego called and updated. All questions answered and informed about visiting hours. Pt's brother stated, will visit tomorrow morning. Pt has low Urine output. Pt has not been in IV fluids and UOA decreased since yesterday. Hospitalist perfectserved; Dr. Buddy Mejia covering and updated. Awaiting for response.    Electronically signed by Marlene Gallagher RN on 9/17/2021 at 3:57 AM

## 2021-09-17 NOTE — PROGRESS NOTES
Speech Language Pathology  Facility/Department: [address] ICU   CLINICAL BEDSIDE SWALLOW EVALUATION    NAME: Vincent Khan  : 1995  MRN: [id number]    ADMISSION DATE: 9/15/2021  ADMITTING DIAGNOSIS:  Encephalopathy [G93.40]  Polysubstance abuse (HCC) [F19.10]  Yeast cystitis [B37.41]  Elevated liver enzymes [R74.8]  Acute encephalopathy [G93.40] has Substance induced mood disorder ([facility] Opiate addiction ([facility] and Encephalopathy on their problem list.    ONSET DATE:   9/15/2021    CHART REVIEW:  9/15/2021 admitted s/p overdose: MD ADMISSION H&P HPI: 31 y.o. male with PMHx of ADHD, IVDA and Hep C presented to [facility] in transfer from [facility] ED for management of polysubstance overdose. Patient was found lying on the sidewalk flopping around by EMS. He was transported to [address] [place] [address] emergency department. He has known history of IV drug abuse with fentanyl and heroin. He reported last used Wednesday night. Urine drug screen positive for amphetamine and benzodiazepine. Patient has been uncooperative and required Geodon and Haldol in the emergency department. On arrival to ICU patient fighting with ICU staff interfering with medical treatment. Sedation required. HPI was obtained through chart review. Patient was in the emergency department at [facility] 2 nights ago for drug overdose. 9/15/2021 CXR: No acute process. 9/15/2021 CT HEAD: No acute intracranial abnormality.       Date of Eval: 2021  Evaluating Therapist: DAGOBERTO Gao    Current Diet level:  Current Diet : NPO  Current Liquid Diet : NPO      Primary Complaint  Patient Complaint: med team concern for dysphagia r/t AMS    Pain:  Pain Level: 0    Reason for Referral  Mack Tinajero was referred for a bedside swallow evaluation to assess the efficiency of his swallow function, identify signs and symptoms of aspiration and make recommendations regarding safe dietary consistencies, effective compensatory strategies, and safe eating environment. Impression  Dysphagia Diagnosis: Mild oral stage dysphagia  · Accepted and tolerated evaluation at bedside. · Patient alert but lethargic with genralized weakness, pleasant, cooperative, Ox4, follows dx; verbally responsive. · Mild oral stage dysphagia characterized by decreased mastication and decreased lingual manipulation resulting in prolonged but adeqaute bolus formation and movement with textured solids without apparent excess labor. · Pharyngeal phase appears grossly WFL. · Recommned regular/thin with ST follow-up to confirm tolerance. Dysphagia Outcome Severity Scale: Level 5: Mild dysphagia- Distant supervision. May need one diet consistency restricted     Treatment Plan  Requires SLP Intervention: Yes  Duration/Frequency of Treatment: ST to follow-up 1-3 times during acute admission  D/C Recommendations:  (suspect no skilled ST need for dysphagia upon discharge)    Recommended Diet and Intervention  Diet Solids Recommendation: Regular  Liquid Consistency Recommendation: Thin  Recommended Form of Meds: PO     Therapeutic Interventions: Diet tolerance monitoring;Patient/Family education    Compensatory Swallowing Strategies  Compensatory Swallowing Strategies: Upright as possible for all oral intake;Remain upright for 30-45 minutes after meals;Eat/Feed slowly; Small bites/sips    Treatment/Goals  Dysphagia Goals: The patient will tolerate recommended diet without observed clinical signs of aspiration; The patient/caregiver will demonstrate understanding of compensatory strategies for improved swallowing safety. General  Chart Reviewed: Yes  Behavior/Cognition: Alert; Cooperative;Pleasant mood; Lethargic; Impulsive  Communication Observation: Functional  Follows Directions: Simple  Dentition: Adequate  Patient Positioning: Upright in bed  Baseline Vocal Quality: Weak  Volitional Cough: Weak  Volitional Swallow:  (WFL)  Consistencies Administered: Dysphagia Pureed (Dysphagia I); Nectar - straw; Thin - straw;Dysphagia Soft and Bite-Sized (Dysphagia III); Reg solid    Vision/Hearing  Vision:  (adequate for assessment needs)  Hearing:  (adequate for assessment needs)    Oral Motor Deficits  Labial Strength: Reduced (d/t generalized weakness)  Lingual Strength: Reduced (d/t generalized weakness)    Oral Phase Dysfunction  Impaired Mastication: Soft Solid; Reg Solid (prolonged d/t generalized weakness; effort comparable across textures)  Decreased Anterior to Posterior Transit: Soft solid;Reg solid (prolonged d/t generalized weakness; effort comparable across textures)     Indicators of Pharyngeal Phase Dysfunction  Pharyngeal Phase: WFL    Prognosis  Prognosis for safe diet advancement: good  Consulted and agree with results and recommendations: Patient;RN    Education  Patient Education: Completed on results/recs/plan  Patient Education Response: Verbalizes understanding;Needs reinforcement         Therapy Time  SLP Individual Minutes  Time In: 0900  Time Out: 0920  Minutes: 20      Stacy Cohen, [address] [place] [address], [id number]  Speech-Language Pathologist  Portable phone: [phone]  9/17/2021 9:20 AM

## 2021-09-17 NOTE — PLAN OF CARE
Problem: Non-Violent Restraints  Goal: Removal from restraints as soon as assessed to be safe  9/17/2021 1633 by Jacklyn Jaquez RN  Outcome: Ongoing  9/17/2021 0730 by Andrew Alvarado RN  Outcome: Ongoing  Note: Upper extremity soft wrist restraints intact. No S/S of restraint related injury . ROM performed Q2HR. Goal: No harm/injury to patient while restraints in use  Outcome: Ongoing  Goal: Patient's dignity will be maintained  Outcome: Ongoing     Problem: Falls - Risk of:  Goal: Will remain free from falls  Description: Will remain free from falls  9/17/2021 1633 by Jacklyn Jaquez RN  Outcome: Ongoing  9/17/2021 0730 by Andrew Alvarado RN  Outcome: Ongoing  Note: Falling star program remains in place. Call light and personal belongings within reach. Frequent visual monitoring continues. Toileting program inplace. Patient assisted in turning/repositioning at least once every 2 hours, and on a prn basis. Goal: Absence of physical injury  Description: Absence of physical injury  Outcome: Ongoing     Problem: Pain:  Goal: Pain level will decrease  Description: Pain level will decrease  9/17/2021 1633 by Jacklyn Jaquez RN  Outcome: Ongoing  9/17/2021 0730 by Andrew Alvarado RN  Outcome: Ongoing  Note: Continuing to monitor pain and discomfort. Monitoring pain level on scale of 0-10. Non- pharmacological measures encouraged to reduce discomfort/pain. PRN pain meds administeration continues when/if applicable as ordered by physician.      Goal: Control of acute pain  Description: Control of acute pain  Outcome: Ongoing  Goal: Control of chronic pain  Description: Control of chronic pain  Outcome: Ongoing     Problem: Confusion - Acute:  Goal: Absence of continued neurological deterioration signs and symptoms  Description: Absence of continued neurological deterioration signs and symptoms  9/17/2021 1633 by Jacklyn Jaquez RN  Outcome: Ongoing  9/17/2021 0730 by Jose Jenkins Sera Collier RN  Outcome: Ongoing  Goal: Mental status will be restored to baseline  Description: Mental status will be restored to baseline  Outcome: Ongoing     Problem: Discharge Planning:  Goal: Ability to perform activities of daily living will improve  Description: Ability to perform activities of daily living will improve  9/17/2021 1633 by Mickie Mazariegos RN  Outcome: Ongoing  9/17/2021 0730 by Lila Mireles RN  Outcome: Ongoing  Goal: Participates in care planning  Description: Participates in care planning  Outcome: Ongoing     Problem: Injury - Risk of, Physical Injury:  Goal: Will remain free from falls  Description: Will remain free from falls  9/17/2021 1633 by Mickie Mazariegos RN  Outcome: Ongoing  9/17/2021 0730 by Lila Mireles RN  Outcome: Ongoing  Note: Falling star program remains in place. Call light and personal belongings within reach. Frequent visual monitoring continues. Toileting program inplace. Patient assisted in turning/repositioning at least once every 2 hours, and on a prn basis.     Goal: Absence of physical injury  Description: Absence of physical injury  Outcome: Ongoing     Problem: Mood - Altered:  Goal: Mood stable  Description: Mood stable  Outcome: Ongoing  Goal: Absence of abusive behavior  Description: Absence of abusive behavior  Outcome: Ongoing  Goal: Verbalizations of feeling emotionally comfortable while being cared for will increase  Description: Verbalizations of feeling emotionally comfortable while being cared for will increase  Outcome: Ongoing     Problem: Psychomotor Activity - Altered:  Goal: Absence of psychomotor disturbance signs and symptoms  Description: Absence of psychomotor disturbance signs and symptoms  9/17/2021 1633 by Mickie Mazariegos RN  Outcome: Ongoing  9/17/2021 0730 by Lila Mireles RN  Outcome: Ongoing     Problem: Sensory Perception - Impaired:  Goal: Demonstrations of improved sensory functioning will increase  Description: Demonstrations of improved sensory functioning will increase  9/17/2021 1633 by Arti Weber RN  Outcome: Ongoing  9/17/2021 0730 by Jose Angel Jones RN  Outcome: Ongoing  Goal: Decrease in sensory misperception frequency  Description: Decrease in sensory misperception frequency  Outcome: Ongoing  Goal: Able to refrain from responding to false sensory perceptions  Description: Able to refrain from responding to false sensory perceptions  Outcome: Ongoing  Goal: Demonstrates accurate environmental perceptions  Description: Demonstrates accurate environmental perceptions  Outcome: Ongoing  Goal: Able to distinguish between reality-based and nonreality-based thinking  Description: Able to distinguish between reality-based and nonreality-based thinking  Outcome: Ongoing  Goal: Able to interrupt nonreality-based thinking  Description: Able to interrupt nonreality-based thinking  Outcome: Ongoing     Problem: Sleep Pattern Disturbance:  Goal: Appears well-rested  Description: Appears well-rested  9/17/2021 1633 by Arti Weber RN  Outcome: Ongoing  9/17/2021 0730 by Jose Angel Jones RN  Outcome: Ongoing

## 2021-09-17 NOTE — PROGRESS NOTES
Hospitalist Progress Note      PCP: No primary care provider on file. Chief Complaint. Presented to hospital for drug overdose    Date of Admission: 9/15/2021    Subjective: No acute events reported overnight, he is confused, mentions there is \"monkey sitting on his back\"    Medications:  Reviewed    Infusion Medications    sodium chloride 100 mL/hr at 09/17/21 1525    sodium chloride      dexmedetomidine 0.8 mcg/kg/hr (09/17/21 1526)     Scheduled Medications    sodium chloride flush  5-40 mL IntraVENous 2 times per day    enoxaparin  40 mg SubCUTAneous Daily    melatonin  3 mg Oral Nightly     PRN Meds: sodium chloride flush, sodium chloride, ondansetron **OR** ondansetron, nicotine, dicyclomine, traZODone, midazolam, ziprasidone **AND** sterile water, LORazepam      Intake/Output Summary (Last 24 hours) at 9/17/2021 1622  Last data filed at 9/17/2021 1526  Gross per 24 hour   Intake 2111.37 ml   Output 260 ml   Net 1851.37 ml       Physical Exam Performed:    /74   Pulse 65   Temp 98.1 °F (36.7 °C) (Axillary)   Resp (!) 32   Ht 6' 1\" (1.854 m)   Wt 131 lb 6.3 oz (59.6 kg)   SpO2 100%   BMI 17.34 kg/m²     General appearance: NAD  HEENT:  Conjunctivae/corneas clear. Neck: Supple, with full range of motion. Respiratory:  Normal respiratory effort. Clear to auscultation, bilaterally without Rales/Wheezes/Rhonchi. Cardiovascular: Regular rate and rhythm with normal S1/S2 without murmurs or rubs  Abdomen: Soft, non-tender, non-distended, normal bowel sounds.   Musculoskeletal: No cyanosis or edema bilaterally  Neurologic: Not following commands or holding conversations  Psychiatric: Alert and oriented x0, Normal mood  Peripheral Pulses: +2 palpable, equal bilaterally       Labs:   Recent Labs     09/15/21  1130 09/16/21  0503 09/17/21  0510   WBC 11.4* 16.0* 15.3*   HGB 14.4 14.1 14.9   HCT 42.3 41.2 42.5    301 274     Recent Labs     09/15/21  1130 09/16/21  0503 09/17/21  0510  142 141   K 4.5 4.2 3.7    105 101   CO2 22 21 19*   BUN 16 26* 49*   CREATININE 0.7* 1.0 1.0   CALCIUM 10.3 10.1 9.6   PHOS  --  4.0 5.7*     Recent Labs     09/15/21  1130   AST 71*   ALT 71*   BILITOT 0.6   ALKPHOS 128     Recent Labs     09/15/21  1400   INR 1.30*     Recent Labs     09/15/21  1130 09/16/21  0503   CKTOTAL  --  1,311*   TROPONINI <0.01  --        Urinalysis:      Lab Results   Component Value Date    NITRU Negative 09/15/2021    WBCUA None seen 09/15/2021    BACTERIA 1+ 11/20/2013    RBCUA None seen 09/15/2021    BLOODU Negative 09/15/2021    SPECGRAV >=1.030 09/15/2021    GLUCOSEU Negative 09/15/2021       Radiology:  XR CHEST 1 VIEW   Final Result   No acute process. CT head without contrast   Final Result   No acute intracranial abnormality. Assessment/Plan:    Active Hospital Problems    Diagnosis     Encephalopathy [G93.40]       patient is a 34-year-old male with past medical history of ADHD, IVDA, hepatitis C who presented to hospital from [facility] ED for overdose with polysubstance abuse. Assessment  Overdose likely ketamine, benzodiazepines  Rhabdomyolysis    Plan  Continue IV fluids, consulted Psychiatry  Continue Precedex, wean as tolerated, started on Ativan as needed agitation, midazolam as needed agitation, nicotine patch, consulted   Continue DVT prophylaxis with Lovenox  Diet: ADULT DIET;  Regular  Code Status: Full Code    PT/OT Eval Status: ordered    Dispo -he is on Precedex in ICU, will transfer to floor once off    Keren Burr MD

## 2021-09-17 NOTE — PLAN OF CARE
Problem: Non-Violent Restraints  Goal: Removal from restraints as soon as assessed to be safe  Outcome: Ongoing  Note: Upper extremity soft wrist restraints intact. No S/S of restraint related injury . ROM performed Q2HR. Problem: Falls - Risk of:  Goal: Will remain free from falls  Description: Will remain free from falls  Outcome: Ongoing  Note: Falling star program remains in place. Call light and personal belongings within reach. Frequent visual monitoring continues. Toileting program inplace. Patient assisted in turning/repositioning at least once every 2 hours, and on a prn basis. Problem: Pain:  Description: Pain management should include both nonpharmacologic and pharmacologic interventions. Goal: Pain level will decrease  Description: Pain level will decrease  Outcome: Ongoing  Note: Continuing to monitor pain and discomfort. Monitoring pain level on scale of 0-10. Non- pharmacological measures encouraged to reduce discomfort/pain. PRN pain meds administeration continues when/if applicable as ordered by physician. Problem: Confusion - Acute:  Goal: Absence of continued neurological deterioration signs and symptoms  Description: Absence of continued neurological deterioration signs and symptoms  Outcome: Ongoing     Problem: Discharge Planning:  Goal: Ability to perform activities of daily living will improve  Description: Ability to perform activities of daily living will improve  Outcome: Ongoing     Problem: Injury - Risk of, Physical Injury:  Goal: Will remain free from falls  Description: Will remain free from falls  Outcome: Ongoing  Note: Falling star program remains in place. Call light and personal belongings within reach. Frequent visual monitoring continues. Toileting program inplace. Patient assisted in turning/repositioning at least once every 2 hours, and on a prn basis.        Problem: Psychomotor Activity - Altered:  Goal: Absence of psychomotor disturbance signs and symptoms  Description: Absence of psychomotor disturbance signs and symptoms  Outcome: Ongoing     Problem: Sensory Perception - Impaired:  Goal: Demonstrations of improved sensory functioning will increase  Description: Demonstrations of improved sensory functioning will increase  Outcome: Ongoing     Problem: Sleep Pattern Disturbance:  Goal: Appears well-rested  Description: Appears well-rested  Outcome: Ongoing

## 2021-09-18 VITALS
SYSTOLIC BLOOD PRESSURE: 148 MMHG | HEIGHT: 73 IN | BODY MASS INDEX: 18.17 KG/M2 | DIASTOLIC BLOOD PRESSURE: 125 MMHG | OXYGEN SATURATION: 100 % | TEMPERATURE: 98.1 F | WEIGHT: 137.13 LBS | RESPIRATION RATE: 19 BRPM | HEART RATE: 66 BPM

## 2021-09-18 LAB
ANION GAP SERPL CALCULATED.3IONS-SCNC: 11 MMOL/L (ref 3–16)
BUN BLDV-MCNC: 19 MG/DL (ref 7–20)
CALCIUM SERPL-MCNC: 8.7 MG/DL (ref 8.3–10.6)
CHLORIDE BLD-SCNC: 105 MMOL/L (ref 99–110)
CO2: 24 MMOL/L (ref 21–32)
CREAT SERPL-MCNC: 0.6 MG/DL (ref 0.9–1.3)
GFR AFRICAN AMERICAN: >60
GFR NON-AFRICAN AMERICAN: >60
GLUCOSE BLD-MCNC: 104 MG/DL (ref 70–99)
HCT VFR BLD CALC: 37.3 % (ref 40.5–52.5)
HEMOGLOBIN: 13 G/DL (ref 13.5–17.5)
MAGNESIUM: 2.1 MG/DL (ref 1.8–2.4)
MCH RBC QN AUTO: 28.2 PG (ref 26–34)
MCHC RBC AUTO-ENTMCNC: 35 G/DL (ref 31–36)
MCV RBC AUTO: 80.7 FL (ref 80–100)
PDW BLD-RTO: 13.7 % (ref 12.4–15.4)
PHOSPHORUS: 2.5 MG/DL (ref 2.5–4.9)
PLATELET # BLD: 239 K/UL (ref 135–450)
PMV BLD AUTO: 7.5 FL (ref 5–10.5)
POTASSIUM SERPL-SCNC: 3.4 MMOL/L (ref 3.5–5.1)
RBC # BLD: 4.62 M/UL (ref 4.2–5.9)
SODIUM BLD-SCNC: 140 MMOL/L (ref 136–145)
TOTAL CK: 737 U/L (ref 39–308)
WBC # BLD: 11.7 K/UL (ref 4–11)

## 2021-09-18 PROCEDURE — 02HV33Z INSERTION OF INFUSION DEVICE INTO SUPERIOR VENA CAVA, PERCUTANEOUS APPROACH: ICD-10-PCS | Performed by: INTERNAL MEDICINE

## 2021-09-18 PROCEDURE — 80048 BASIC METABOLIC PNL TOTAL CA: CPT

## 2021-09-18 PROCEDURE — 2580000003 HC RX 258: Performed by: INTERNAL MEDICINE

## 2021-09-18 PROCEDURE — 84100 ASSAY OF PHOSPHORUS: CPT

## 2021-09-18 PROCEDURE — 6360000002 HC RX W HCPCS: Performed by: INTERNAL MEDICINE

## 2021-09-18 PROCEDURE — 36569 INSJ PICC 5 YR+ W/O IMAGING: CPT

## 2021-09-18 PROCEDURE — 83735 ASSAY OF MAGNESIUM: CPT

## 2021-09-18 PROCEDURE — C1751 CATH, INF, PER/CENT/MIDLINE: HCPCS

## 2021-09-18 PROCEDURE — 85027 COMPLETE CBC AUTOMATED: CPT

## 2021-09-18 PROCEDURE — 2500000003 HC RX 250 WO HCPCS: Performed by: INTERNAL MEDICINE

## 2021-09-18 PROCEDURE — 6360000002 HC RX W HCPCS: Performed by: NURSE PRACTITIONER

## 2021-09-18 PROCEDURE — 76937 US GUIDE VASCULAR ACCESS: CPT

## 2021-09-18 PROCEDURE — 2000000000 HC ICU R&B

## 2021-09-18 PROCEDURE — 82550 ASSAY OF CK (CPK): CPT

## 2021-09-18 RX ORDER — SODIUM CHLORIDE 9 MG/ML
25 INJECTION, SOLUTION INTRAVENOUS PRN
Status: DISCONTINUED | OUTPATIENT
Start: 2021-09-18 | End: 2021-09-18 | Stop reason: SDUPTHER

## 2021-09-18 RX ORDER — SODIUM CHLORIDE 0.9 % (FLUSH) 0.9 %
5-40 SYRINGE (ML) INJECTION EVERY 12 HOURS SCHEDULED
Status: DISCONTINUED | OUTPATIENT
Start: 2021-09-18 | End: 2021-09-18 | Stop reason: SDUPTHER

## 2021-09-18 RX ORDER — SODIUM CHLORIDE 0.9 % (FLUSH) 0.9 %
5-40 SYRINGE (ML) INJECTION PRN
Status: DISCONTINUED | OUTPATIENT
Start: 2021-09-18 | End: 2021-09-18 | Stop reason: SDUPTHER

## 2021-09-18 RX ORDER — LIDOCAINE HYDROCHLORIDE 10 MG/ML
5 INJECTION, SOLUTION EPIDURAL; INFILTRATION; INTRACAUDAL; PERINEURAL ONCE
Status: COMPLETED | OUTPATIENT
Start: 2021-09-18 | End: 2021-09-18

## 2021-09-18 RX ADMIN — ONDANSETRON 4 MG: 2 INJECTION INTRAMUSCULAR; INTRAVENOUS at 03:20

## 2021-09-18 RX ADMIN — LIDOCAINE HYDROCHLORIDE 5 ML: 10 INJECTION, SOLUTION EPIDURAL; INFILTRATION; INTRACAUDAL; PERINEURAL at 03:13

## 2021-09-18 RX ADMIN — SODIUM CHLORIDE: 9 INJECTION, SOLUTION INTRAVENOUS at 05:07

## 2021-09-18 RX ADMIN — LORAZEPAM 2 MG: 2 INJECTION INTRAMUSCULAR; INTRAVENOUS at 03:32

## 2021-09-18 ASSESSMENT — PAIN SCALES - GENERAL
PAINLEVEL_OUTOF10: 0

## 2021-09-18 NOTE — CARE COORDINATION
ALIN was following this patient for rehab needs and was scheduled to meet with him this morning. Upon returning to work we learned that he signed himself out AMA. No further needs. Respectfully submitted,    MACK Piña  [facility]   [phone]    Electronically signed by MACK De La Torre on 9/18/2021 at 8:44 AM

## 2021-09-18 NOTE — PROGRESS NOTES
Upon arrival to place PICC line assessed chart for issues related to picc placement, check for consent, and did time out with [employer]. Pt. Tolerated PICC placement well, no difficulty accessing basilic vein and 3CG technology used to verify PICC tip placement. Positive P wave with no negative deflection. Printed wave form and placed In chart.  Reported off to [employer]

## 2021-09-18 NOTE — PLAN OF CARE
Problem: Non-Violent Restraints  Goal: Removal from restraints as soon as assessed to be safe  Outcome: Ongoing  Note: Upper extremity soft wrist restraints intact. No S/S of restraint related injury . ROM performed Q2HR. Problem: Falls - Risk of:  Goal: Will remain free from falls  Description: Will remain free from falls  Outcome: Ongoing  Note: Falling star program remains in place. Call light and personal belongings within reach. Frequent visual monitoring continues. Toileting program inplace. Patient assisted in turning/repositioning at least once every 2 hours, and on a prn basis. Problem: Discharge Planning:  Goal: Ability to perform activities of daily living will improve  Description: Ability to perform activities of daily living will improve  Outcome: Ongoing     Problem: Injury - Risk of, Physical Injury:  Goal: Will remain free from falls  Description: Will remain free from falls  Outcome: Ongoing  Note: Falling star program remains in place. Call light and personal belongings within reach. Frequent visual monitoring continues. Toileting program inplace. Patient assisted in turning/repositioning at least once every 2 hours, and on a prn basis.        Problem: Mood - Altered:  Goal: Mood stable  Description: Mood stable  Outcome: Ongoing     Problem: Sensory Perception - Impaired:  Goal: Decrease in sensory misperception frequency  Description: Decrease in sensory misperception frequency  Outcome: Ongoing     Problem: Sleep Pattern Disturbance:  Goal: Appears well-rested  Description: Appears well-rested  Outcome: Ongoing

## 2021-09-18 NOTE — PROGRESS NOTES
9691: pt requesting to leave AMA; Dr. Elsie Perez perfectserved and updated. Awaiting for response. 7816: Dr. Rosanne Aguila responded to check with pt  if pt can stay until pt can be evaluated by psych. Pt still insisting to leave AMA and Dr. Elsie Perez updated. 0650: pt signed AMA paper work; PICC line and goodson out.  continuous  NS stopped .    Electronically signed by Fabiola Danielson RN on 9/18/2021 at 8:04 AM

## 2021-09-18 NOTE — PROGRESS NOTES
2250:  pt started to complain pain in IV site; pt's hand swollen;  IV meds and fluids stopped and attempted to flush; Pt IV infiltrated; failed  three times ultrasound guided and three times IV finder attempts. Dr. Dejah Gallagher perfectserved and updated. Okay to hold precedex and order picc line. 0136: pt alert and orient X 4, picc consent signed by pt at this time. 0140: RN called to DIT and notify for the new picc order.    46: DIT RN at bedside placing PICC    Electronically signed by Amando Tao RN on 9/18/2021 at 5:11 AM

## 2021-10-10 ENCOUNTER — APPOINTMENT (OUTPATIENT)
Dept: CT IMAGING | Age: 26
DRG: 812 | End: 2021-10-10
Payer: MEDICAID

## 2021-10-10 ENCOUNTER — HOSPITAL ENCOUNTER (INPATIENT)
Age: 26
LOS: 2 days | Discharge: LEFT BEFORE DISCHARGE | DRG: 812 | End: 2021-10-12
Attending: EMERGENCY MEDICINE | Admitting: STUDENT IN AN ORGANIZED HEALTH CARE EDUCATION/TRAINING PROGRAM
Payer: MEDICAID

## 2021-10-10 ENCOUNTER — APPOINTMENT (OUTPATIENT)
Dept: GENERAL RADIOLOGY | Age: 26
DRG: 812 | End: 2021-10-10
Payer: MEDICAID

## 2021-10-10 DIAGNOSIS — T50.901A ACCIDENTAL DRUG OVERDOSE, INITIAL ENCOUNTER: Primary | ICD-10-CM

## 2021-10-10 DIAGNOSIS — F11.10 HEROIN ABUSE (HCC): ICD-10-CM

## 2021-10-10 DIAGNOSIS — R94.31 PROLONGED Q-T INTERVAL ON ECG: ICD-10-CM

## 2021-10-10 DIAGNOSIS — R00.1 BRADYCARDIA: ICD-10-CM

## 2021-10-10 PROBLEM — R55 SYNCOPE: Status: ACTIVE | Noted: 2021-10-10

## 2021-10-10 PROBLEM — R55 SYNCOPE AND COLLAPSE: Status: ACTIVE | Noted: 2021-10-10

## 2021-10-10 PROBLEM — R41.82 AMS (ALTERED MENTAL STATUS): Status: ACTIVE | Noted: 2021-10-10

## 2021-10-10 PROBLEM — F19.90 DRUG USE: Status: ACTIVE | Noted: 2021-10-10

## 2021-10-10 LAB
A/G RATIO: 1.4 (ref 1.1–2.2)
ACETAMINOPHEN LEVEL: <5 UG/ML (ref 10–30)
ALBUMIN SERPL-MCNC: 4.1 G/DL (ref 3.4–5)
ALP BLD-CCNC: 97 U/L (ref 40–129)
ALT SERPL-CCNC: 75 U/L (ref 10–40)
ANION GAP SERPL CALCULATED.3IONS-SCNC: 10 MMOL/L (ref 3–16)
AST SERPL-CCNC: 35 U/L (ref 15–37)
BASOPHILS ABSOLUTE: 0 K/UL (ref 0–0.2)
BASOPHILS RELATIVE PERCENT: 0.1 %
BILIRUB SERPL-MCNC: 0.4 MG/DL (ref 0–1)
BUN BLDV-MCNC: 8 MG/DL (ref 7–20)
CALCIUM SERPL-MCNC: 9.6 MG/DL (ref 8.3–10.6)
CHLORIDE BLD-SCNC: 97 MMOL/L (ref 99–110)
CO2: 30 MMOL/L (ref 21–32)
CREAT SERPL-MCNC: 0.7 MG/DL (ref 0.9–1.3)
EOSINOPHILS ABSOLUTE: 0 K/UL (ref 0–0.6)
EOSINOPHILS RELATIVE PERCENT: 0.1 %
ETHANOL: NORMAL MG/DL (ref 0–0.08)
GFR AFRICAN AMERICAN: >60
GFR NON-AFRICAN AMERICAN: >60
GLOBULIN: 2.9 G/DL
GLUCOSE BLD-MCNC: 81 MG/DL (ref 70–99)
HCT VFR BLD CALC: 38.4 % (ref 40.5–52.5)
HEMOGLOBIN: 12.9 G/DL (ref 13.5–17.5)
LYMPHOCYTES ABSOLUTE: 1.2 K/UL (ref 1–5.1)
LYMPHOCYTES RELATIVE PERCENT: 19.3 %
MAGNESIUM: 2.1 MG/DL (ref 1.8–2.4)
MCH RBC QN AUTO: 27.6 PG (ref 26–34)
MCHC RBC AUTO-ENTMCNC: 33.8 G/DL (ref 31–36)
MCV RBC AUTO: 81.6 FL (ref 80–100)
MONOCYTES ABSOLUTE: 0.7 K/UL (ref 0–1.3)
MONOCYTES RELATIVE PERCENT: 11.5 %
NEUTROPHILS ABSOLUTE: 4.2 K/UL (ref 1.7–7.7)
NEUTROPHILS RELATIVE PERCENT: 69 %
PDW BLD-RTO: 14.1 % (ref 12.4–15.4)
PLATELET # BLD: 251 K/UL (ref 135–450)
PMV BLD AUTO: 7.4 FL (ref 5–10.5)
POTASSIUM REFLEX MAGNESIUM: 4.6 MMOL/L (ref 3.5–5.1)
RBC # BLD: 4.7 M/UL (ref 4.2–5.9)
SALICYLATE, SERUM: <0.3 MG/DL (ref 15–30)
SODIUM BLD-SCNC: 137 MMOL/L (ref 136–145)
TOTAL PROTEIN: 7 G/DL (ref 6.4–8.2)
TROPONIN: <0.01 NG/ML
TROPONIN: <0.01 NG/ML
WBC # BLD: 6.2 K/UL (ref 4–11)

## 2021-10-10 PROCEDURE — 2580000003 HC RX 258: Performed by: PHYSICIAN ASSISTANT

## 2021-10-10 PROCEDURE — 93005 ELECTROCARDIOGRAM TRACING: CPT | Performed by: PHYSICIAN ASSISTANT

## 2021-10-10 PROCEDURE — 93005 ELECTROCARDIOGRAM TRACING: CPT | Performed by: STUDENT IN AN ORGANIZED HEALTH CARE EDUCATION/TRAINING PROGRAM

## 2021-10-10 PROCEDURE — 80143 DRUG ASSAY ACETAMINOPHEN: CPT

## 2021-10-10 PROCEDURE — 36415 COLL VENOUS BLD VENIPUNCTURE: CPT

## 2021-10-10 PROCEDURE — 83735 ASSAY OF MAGNESIUM: CPT

## 2021-10-10 PROCEDURE — 96361 HYDRATE IV INFUSION ADD-ON: CPT

## 2021-10-10 PROCEDURE — 82077 ASSAY SPEC XCP UR&BREATH IA: CPT

## 2021-10-10 PROCEDURE — 2060000000 HC ICU INTERMEDIATE R&B

## 2021-10-10 PROCEDURE — 99284 EMERGENCY DEPT VISIT MOD MDM: CPT

## 2021-10-10 PROCEDURE — 80179 DRUG ASSAY SALICYLATE: CPT

## 2021-10-10 PROCEDURE — 84484 ASSAY OF TROPONIN QUANT: CPT

## 2021-10-10 PROCEDURE — 85025 COMPLETE CBC W/AUTO DIFF WBC: CPT

## 2021-10-10 PROCEDURE — 70450 CT HEAD/BRAIN W/O DYE: CPT

## 2021-10-10 PROCEDURE — 2580000003 HC RX 258: Performed by: STUDENT IN AN ORGANIZED HEALTH CARE EDUCATION/TRAINING PROGRAM

## 2021-10-10 PROCEDURE — 96360 HYDRATION IV INFUSION INIT: CPT

## 2021-10-10 PROCEDURE — 71045 X-RAY EXAM CHEST 1 VIEW: CPT

## 2021-10-10 PROCEDURE — 80053 COMPREHEN METABOLIC PANEL: CPT

## 2021-10-10 RX ORDER — ONDANSETRON 4 MG/1
4 TABLET, ORALLY DISINTEGRATING ORAL EVERY 8 HOURS PRN
Status: DISCONTINUED | OUTPATIENT
Start: 2021-10-10 | End: 2021-10-12 | Stop reason: HOSPADM

## 2021-10-10 RX ORDER — POLYETHYLENE GLYCOL 3350 17 G/17G
17 POWDER, FOR SOLUTION ORAL DAILY PRN
Status: DISCONTINUED | OUTPATIENT
Start: 2021-10-10 | End: 2021-10-12 | Stop reason: HOSPADM

## 2021-10-10 RX ORDER — 0.9 % SODIUM CHLORIDE 0.9 %
500 INTRAVENOUS SOLUTION INTRAVENOUS ONCE
Status: COMPLETED | OUTPATIENT
Start: 2021-10-10 | End: 2021-10-10

## 2021-10-10 RX ORDER — SODIUM CHLORIDE 9 MG/ML
INJECTION, SOLUTION INTRAVENOUS CONTINUOUS
Status: DISCONTINUED | OUTPATIENT
Start: 2021-10-10 | End: 2021-10-12 | Stop reason: HOSPADM

## 2021-10-10 RX ORDER — ACETAMINOPHEN 325 MG/1
650 TABLET ORAL EVERY 6 HOURS PRN
Status: DISCONTINUED | OUTPATIENT
Start: 2021-10-10 | End: 2021-10-12 | Stop reason: HOSPADM

## 2021-10-10 RX ORDER — ACETAMINOPHEN 650 MG/1
650 SUPPOSITORY RECTAL EVERY 6 HOURS PRN
Status: DISCONTINUED | OUTPATIENT
Start: 2021-10-10 | End: 2021-10-12 | Stop reason: HOSPADM

## 2021-10-10 RX ORDER — SODIUM CHLORIDE 0.9 % (FLUSH) 0.9 %
5-40 SYRINGE (ML) INJECTION EVERY 12 HOURS SCHEDULED
Status: DISCONTINUED | OUTPATIENT
Start: 2021-10-10 | End: 2021-10-12 | Stop reason: HOSPADM

## 2021-10-10 RX ORDER — SODIUM CHLORIDE 9 MG/ML
25 INJECTION, SOLUTION INTRAVENOUS PRN
Status: DISCONTINUED | OUTPATIENT
Start: 2021-10-10 | End: 2021-10-12 | Stop reason: HOSPADM

## 2021-10-10 RX ORDER — ONDANSETRON 2 MG/ML
4 INJECTION INTRAMUSCULAR; INTRAVENOUS EVERY 6 HOURS PRN
Status: DISCONTINUED | OUTPATIENT
Start: 2021-10-10 | End: 2021-10-12 | Stop reason: HOSPADM

## 2021-10-10 RX ORDER — SODIUM CHLORIDE 0.9 % (FLUSH) 0.9 %
5-40 SYRINGE (ML) INJECTION PRN
Status: DISCONTINUED | OUTPATIENT
Start: 2021-10-10 | End: 2021-10-12 | Stop reason: HOSPADM

## 2021-10-10 RX ADMIN — SODIUM CHLORIDE: 9 INJECTION, SOLUTION INTRAVENOUS at 21:24

## 2021-10-10 RX ADMIN — SODIUM CHLORIDE 500 ML: 9 INJECTION, SOLUTION INTRAVENOUS at 19:20

## 2021-10-10 ASSESSMENT — PAIN SCALES - GENERAL: PAINLEVEL_OUTOF10: 0

## 2021-10-10 NOTE — ED PROVIDER NOTES
[address] [place] [address]  eMERGENCY dEPARTMENT eNCOUnter   Physician Attestation    Pt Name: Nita Valdes  MRN: [id number]  Nickgfrichard 1995  Date of evaluation: 10/10/21        Physician Note:    I havepersonally performed and/or participated in the history, exam and medical decision making and agree with all pertinent clinical information. I have also reviewed and agree with the past medical, family and social historyunless otherwise noted. I have personally performed a face to face diagnostic evaluation onthis patient. I have reviewed the mid-levels findings and agree. History: This is an unfortunate 65-year-old male with a history of chronic substance abuse. It does sound as if he has had a few potentially life-threatening events from it. He took some injectable drugs several hours ago. He states for about 3 hours he has no recollection of anything happening. When he woke up he started having trouble swallowing and he was concerned that he was going to get worse. Apparently in the past he had ended up intubated in intensive care. Physical Exam  Constitutional:       Appearance: He is well-developed and underweight. He is ill-appearing (Appears chronically ill). He is not diaphoretic. HENT:      Head: Normocephalic and atraumatic. Right Ear: External ear normal.      Left Ear: External ear normal.   Eyes:      General: No scleral icterus. Right eye: No discharge. Left eye: No discharge. Neck:      Thyroid: No thyromegaly. Vascular: No JVD. Trachea: No tracheal deviation. Cardiovascular:      Rate and Rhythm: Regular rhythm. Bradycardia present. Heart sounds: Normal heart sounds. No murmur heard. No friction rub. No gallop. Pulmonary:      Effort: Pulmonary effort is normal. No respiratory distress. Breath sounds: Normal breath sounds. No stridor. No wheezing or rales. Abdominal:      General: There is no distension.       Palpations: Abdomen is soft. Tenderness: There is no abdominal tenderness. There is no guarding or rebound. Musculoskeletal:         General: No tenderness. Cervical back: Normal range of motion. Skin:     General: Skin is warm and dry. Findings: No rash (On exposed body surfaces). Comments: He is multiple track marks but I do not see any signs of abscess cellulitis. Neurological:      Mental Status: He is alert and oriented to person, place, and time. Coordination: Coordination normal.   Psychiatric:         Behavior: Behavior normal.         Thought Content: Thought content normal.       EKG visualized preliminarily interpreted by myself. The rhythm is sinus bradycardia with a rate of 53. Axis is 89. He has diffuse repolarization changes but they do seem a bit more prominent in V3 and V4. I had to be concerned about possible acute injury. I discussed EKG with on-call cardiologist Dr. Leslie Bingham who felt we should just treat him clinically. The patient is not having active chest pain or presenting with an acute ST elevation clinical picture. EKG was done at 2051 hrs. Visualized and preliminarily interpreted by myself. Still sinus bradycardia. The axis is the same at 90. His QT is still prolonged. Similar repolarization and ST changes compared to previous. CRITICAL CARE: There was a high probability of clinically significant/life threatening deterioration in this patient's condition which required my urgent intervention. Total critical care time was 30 minutes. This excludes any time for separately reportable procedures. EKG findings in the setting of an overdose or just a little bit concerning enough to observe him overnight. 1. Accidental drug overdose, initial encounter    2. Heroin abuse ([facility] [address]    3. Prolonged Q-T interval on ECG    4. Bradycardia          DISPOSITION/PLAN  PATIENT REFERRED TO:  No follow-up provider specified.   DISCHARGE MEDICATIONS:  New Prescriptions No medications on file         MD Amaris Pascal MD  10/10/21 1395

## 2021-10-11 LAB
A/G RATIO: 1.4 (ref 1.1–2.2)
ALBUMIN SERPL-MCNC: 3.9 G/DL (ref 3.4–5)
ALP BLD-CCNC: 92 U/L (ref 40–129)
ALT SERPL-CCNC: 65 U/L (ref 10–40)
AMPHETAMINE SCREEN, URINE: NORMAL
ANION GAP SERPL CALCULATED.3IONS-SCNC: 12 MMOL/L (ref 3–16)
AST SERPL-CCNC: 31 U/L (ref 15–37)
BACTERIA: ABNORMAL /HPF
BARBITURATE SCREEN URINE: NORMAL
BASOPHILS ABSOLUTE: 0 K/UL (ref 0–0.2)
BASOPHILS RELATIVE PERCENT: 0.4 %
BENZODIAZEPINE SCREEN, URINE: NORMAL
BILIRUB SERPL-MCNC: 0.5 MG/DL (ref 0–1)
BILIRUBIN URINE: NEGATIVE
BLOOD, URINE: NEGATIVE
BUN BLDV-MCNC: 8 MG/DL (ref 7–20)
CALCIUM SERPL-MCNC: 9.1 MG/DL (ref 8.3–10.6)
CANNABINOID SCREEN URINE: NORMAL
CHLORIDE BLD-SCNC: 102 MMOL/L (ref 99–110)
CLARITY: CLEAR
CO2: 25 MMOL/L (ref 21–32)
COCAINE METABOLITE SCREEN URINE: NORMAL
COLOR: YELLOW
CREAT SERPL-MCNC: 0.6 MG/DL (ref 0.9–1.3)
EKG ATRIAL RATE: 40 BPM
EKG ATRIAL RATE: 45 BPM
EKG ATRIAL RATE: 53 BPM
EKG ATRIAL RATE: 53 BPM
EKG DIAGNOSIS: NORMAL
EKG P AXIS: 67 DEGREES
EKG P AXIS: 73 DEGREES
EKG P AXIS: 73 DEGREES
EKG P AXIS: 81 DEGREES
EKG P-R INTERVAL: 130 MS
EKG P-R INTERVAL: 132 MS
EKG P-R INTERVAL: 136 MS
EKG P-R INTERVAL: 138 MS
EKG Q-T INTERVAL: 498 MS
EKG Q-T INTERVAL: 528 MS
EKG Q-T INTERVAL: 546 MS
EKG Q-T INTERVAL: 546 MS
EKG QRS DURATION: 80 MS
EKG QRS DURATION: 82 MS
EKG QRS DURATION: 82 MS
EKG QRS DURATION: 88 MS
EKG QTC CALCULATION (BAZETT): 444 MS
EKG QTC CALCULATION (BAZETT): 467 MS
EKG QTC CALCULATION (BAZETT): 472 MS
EKG QTC CALCULATION (BAZETT): 495 MS
EKG R AXIS: 84 DEGREES
EKG R AXIS: 89 DEGREES
EKG R AXIS: 90 DEGREES
EKG R AXIS: 91 DEGREES
EKG T AXIS: 71 DEGREES
EKG T AXIS: 78 DEGREES
EKG T AXIS: 80 DEGREES
EKG T AXIS: 84 DEGREES
EKG VENTRICULAR RATE: 40 BPM
EKG VENTRICULAR RATE: 45 BPM
EKG VENTRICULAR RATE: 53 BPM
EKG VENTRICULAR RATE: 53 BPM
EOSINOPHILS ABSOLUTE: 0 K/UL (ref 0–0.6)
EOSINOPHILS RELATIVE PERCENT: 0.6 %
EPITHELIAL CELLS, UA: 0 /HPF (ref 0–5)
GFR AFRICAN AMERICAN: >60
GFR NON-AFRICAN AMERICAN: >60
GLOBULIN: 2.8 G/DL
GLUCOSE BLD-MCNC: 95 MG/DL (ref 70–99)
GLUCOSE URINE: NEGATIVE MG/DL
HCT VFR BLD CALC: 36.6 % (ref 40.5–52.5)
HEMOGLOBIN: 12.3 G/DL (ref 13.5–17.5)
HYALINE CASTS: 0 /LPF (ref 0–8)
KETONES, URINE: NEGATIVE MG/DL
LEUKOCYTE ESTERASE, URINE: ABNORMAL
LV EF: 58 %
LVEF MODALITY: NORMAL
LYMPHOCYTES ABSOLUTE: 1.2 K/UL (ref 1–5.1)
LYMPHOCYTES RELATIVE PERCENT: 22.8 %
Lab: NORMAL
MAGNESIUM: 1.9 MG/DL (ref 1.8–2.4)
MCH RBC QN AUTO: 27.5 PG (ref 26–34)
MCHC RBC AUTO-ENTMCNC: 33.5 G/DL (ref 31–36)
MCV RBC AUTO: 82 FL (ref 80–100)
METHADONE SCREEN, URINE: NORMAL
MICROSCOPIC EXAMINATION: YES
MONOCYTES ABSOLUTE: 0.4 K/UL (ref 0–1.3)
MONOCYTES RELATIVE PERCENT: 7.7 %
NEUTROPHILS ABSOLUTE: 3.7 K/UL (ref 1.7–7.7)
NEUTROPHILS RELATIVE PERCENT: 68.5 %
NITRITE, URINE: NEGATIVE
OPIATE SCREEN URINE: NORMAL
OXYCODONE URINE: NORMAL
PDW BLD-RTO: 13.6 % (ref 12.4–15.4)
PH UA: 8.5
PH UA: 8.5 (ref 5–8)
PHENCYCLIDINE SCREEN URINE: NORMAL
PLATELET # BLD: 205 K/UL (ref 135–450)
PMV BLD AUTO: 7.7 FL (ref 5–10.5)
POTASSIUM SERPL-SCNC: 4.4 MMOL/L (ref 3.5–5.1)
PROPOXYPHENE SCREEN: NORMAL
PROTEIN UA: NEGATIVE MG/DL
RBC # BLD: 4.46 M/UL (ref 4.2–5.9)
RBC UA: 0 /HPF (ref 0–4)
SODIUM BLD-SCNC: 139 MMOL/L (ref 136–145)
SPECIFIC GRAVITY UA: 1.01 (ref 1–1.03)
TOTAL PROTEIN: 6.7 G/DL (ref 6.4–8.2)
TROPONIN: <0.01 NG/ML
URINE REFLEX TO CULTURE: ABNORMAL
URINE TYPE: ABNORMAL
UROBILINOGEN, URINE: 4 E.U./DL
WBC # BLD: 5.5 K/UL (ref 4–11)
WBC UA: 8 /HPF (ref 0–5)

## 2021-10-11 PROCEDURE — 80307 DRUG TEST PRSMV CHEM ANLYZR: CPT

## 2021-10-11 PROCEDURE — 6370000000 HC RX 637 (ALT 250 FOR IP): Performed by: INTERNAL MEDICINE

## 2021-10-11 PROCEDURE — 94760 N-INVAS EAR/PLS OXIMETRY 1: CPT

## 2021-10-11 PROCEDURE — 80053 COMPREHEN METABOLIC PANEL: CPT

## 2021-10-11 PROCEDURE — 05HC33Z INSERTION OF INFUSION DEVICE INTO LEFT BASILIC VEIN, PERCUTANEOUS APPROACH: ICD-10-PCS | Performed by: STUDENT IN AN ORGANIZED HEALTH CARE EDUCATION/TRAINING PROGRAM

## 2021-10-11 PROCEDURE — 2500000003 HC RX 250 WO HCPCS: Performed by: INTERNAL MEDICINE

## 2021-10-11 PROCEDURE — 93010 ELECTROCARDIOGRAM REPORT: CPT | Performed by: INTERNAL MEDICINE

## 2021-10-11 PROCEDURE — 2580000003 HC RX 258: Performed by: STUDENT IN AN ORGANIZED HEALTH CARE EDUCATION/TRAINING PROGRAM

## 2021-10-11 PROCEDURE — 36415 COLL VENOUS BLD VENIPUNCTURE: CPT

## 2021-10-11 PROCEDURE — 6360000002 HC RX W HCPCS

## 2021-10-11 PROCEDURE — 85025 COMPLETE CBC W/AUTO DIFF WBC: CPT

## 2021-10-11 PROCEDURE — 93321 DOPPLER ECHO F-UP/LMTD STD: CPT

## 2021-10-11 PROCEDURE — 93325 DOPPLER ECHO COLOR FLOW MAPG: CPT

## 2021-10-11 PROCEDURE — 6360000002 HC RX W HCPCS: Performed by: INTERNAL MEDICINE

## 2021-10-11 PROCEDURE — 81001 URINALYSIS AUTO W/SCOPE: CPT

## 2021-10-11 PROCEDURE — 83735 ASSAY OF MAGNESIUM: CPT

## 2021-10-11 PROCEDURE — 2000000000 HC ICU R&B

## 2021-10-11 PROCEDURE — 93308 TTE F-UP OR LMTD: CPT

## 2021-10-11 PROCEDURE — 93005 ELECTROCARDIOGRAM TRACING: CPT | Performed by: STUDENT IN AN ORGANIZED HEALTH CARE EDUCATION/TRAINING PROGRAM

## 2021-10-11 RX ORDER — LORAZEPAM 2 MG/ML
2 INJECTION INTRAMUSCULAR EVERY 4 HOURS
Status: DISCONTINUED | OUTPATIENT
Start: 2021-10-11 | End: 2021-10-11

## 2021-10-11 RX ORDER — LORAZEPAM 2 MG/ML
2 INJECTION INTRAMUSCULAR EVERY 4 HOURS PRN
Status: DISCONTINUED | OUTPATIENT
Start: 2021-10-11 | End: 2021-10-11

## 2021-10-11 RX ORDER — LORAZEPAM 1 MG/1
4 TABLET ORAL
Status: DISCONTINUED | OUTPATIENT
Start: 2021-10-11 | End: 2021-10-12 | Stop reason: HOSPADM

## 2021-10-11 RX ORDER — LORAZEPAM 2 MG/ML
4 INJECTION INTRAMUSCULAR
Status: DISCONTINUED | OUTPATIENT
Start: 2021-10-11 | End: 2021-10-12 | Stop reason: HOSPADM

## 2021-10-11 RX ORDER — BUPRENORPHINE 2 MG/1
2 TABLET SUBLINGUAL PRN
Status: DISCONTINUED | OUTPATIENT
Start: 2021-10-11 | End: 2021-10-12 | Stop reason: HOSPADM

## 2021-10-11 RX ORDER — LORAZEPAM 2 MG/ML
2 INJECTION INTRAMUSCULAR
Status: DISCONTINUED | OUTPATIENT
Start: 2021-10-11 | End: 2021-10-12 | Stop reason: HOSPADM

## 2021-10-11 RX ORDER — DEXMEDETOMIDINE HYDROCHLORIDE 4 UG/ML
.2-1.4 INJECTION, SOLUTION INTRAVENOUS CONTINUOUS
Status: DISCONTINUED | OUTPATIENT
Start: 2021-10-11 | End: 2021-10-12 | Stop reason: HOSPADM

## 2021-10-11 RX ORDER — SODIUM CHLORIDE 9 MG/ML
25 INJECTION, SOLUTION INTRAVENOUS PRN
Status: DISCONTINUED | OUTPATIENT
Start: 2021-10-11 | End: 2021-10-12 | Stop reason: HOSPADM

## 2021-10-11 RX ORDER — LORAZEPAM 1 MG/1
3 TABLET ORAL
Status: DISCONTINUED | OUTPATIENT
Start: 2021-10-11 | End: 2021-10-12 | Stop reason: HOSPADM

## 2021-10-11 RX ORDER — CLONIDINE 0.1 MG/24H
1 PATCH, EXTENDED RELEASE TRANSDERMAL WEEKLY
Status: DISCONTINUED | OUTPATIENT
Start: 2021-10-11 | End: 2021-10-12 | Stop reason: HOSPADM

## 2021-10-11 RX ORDER — LORAZEPAM 1 MG/1
2 TABLET ORAL
Status: DISCONTINUED | OUTPATIENT
Start: 2021-10-11 | End: 2021-10-12 | Stop reason: HOSPADM

## 2021-10-11 RX ORDER — LORAZEPAM 2 MG/ML
3 INJECTION INTRAMUSCULAR
Status: DISCONTINUED | OUTPATIENT
Start: 2021-10-11 | End: 2021-10-12 | Stop reason: HOSPADM

## 2021-10-11 RX ORDER — CLONIDINE HYDROCHLORIDE 0.1 MG/1
0.1 TABLET ORAL PRN
Status: DISCONTINUED | OUTPATIENT
Start: 2021-10-11 | End: 2021-10-12 | Stop reason: HOSPADM

## 2021-10-11 RX ORDER — LORAZEPAM 2 MG/ML
INJECTION INTRAMUSCULAR
Status: COMPLETED
Start: 2021-10-11 | End: 2021-10-11

## 2021-10-11 RX ORDER — LORAZEPAM 2 MG/ML
1 INJECTION INTRAMUSCULAR
Status: DISCONTINUED | OUTPATIENT
Start: 2021-10-11 | End: 2021-10-12 | Stop reason: HOSPADM

## 2021-10-11 RX ORDER — LORAZEPAM 1 MG/1
1 TABLET ORAL
Status: DISCONTINUED | OUTPATIENT
Start: 2021-10-11 | End: 2021-10-12 | Stop reason: HOSPADM

## 2021-10-11 RX ORDER — LORAZEPAM 2 MG/ML
2 INJECTION INTRAMUSCULAR ONCE
Status: COMPLETED | OUTPATIENT
Start: 2021-10-11 | End: 2021-10-11

## 2021-10-11 RX ORDER — SODIUM CHLORIDE 0.9 % (FLUSH) 0.9 %
5-40 SYRINGE (ML) INJECTION PRN
Status: DISCONTINUED | OUTPATIENT
Start: 2021-10-11 | End: 2021-10-12 | Stop reason: HOSPADM

## 2021-10-11 RX ADMIN — BUPRENORPHINE 2 MG: 2 TABLET SUBLINGUAL at 09:23

## 2021-10-11 RX ADMIN — LORAZEPAM: 2 INJECTION INTRAMUSCULAR at 10:50

## 2021-10-11 RX ADMIN — DEXMEDETOMIDINE HYDROCHLORIDE 0.2 MCG/KG/HR: 4 INJECTION, SOLUTION INTRAVENOUS at 20:55

## 2021-10-11 RX ADMIN — BUPRENORPHINE 2 MG: 2 TABLET SUBLINGUAL at 11:13

## 2021-10-11 RX ADMIN — SODIUM CHLORIDE: 9 INJECTION, SOLUTION INTRAVENOUS at 13:30

## 2021-10-11 RX ADMIN — LORAZEPAM 4 MG: 2 INJECTION INTRAMUSCULAR; INTRAVENOUS at 21:32

## 2021-10-11 RX ADMIN — LORAZEPAM: 2 INJECTION INTRAMUSCULAR; INTRAVENOUS at 10:50

## 2021-10-11 RX ADMIN — LORAZEPAM 4 MG: 2 INJECTION INTRAMUSCULAR; INTRAVENOUS at 19:55

## 2021-10-11 RX ADMIN — LORAZEPAM 4 MG: 2 INJECTION INTRAMUSCULAR; INTRAVENOUS at 13:29

## 2021-10-11 RX ADMIN — BUPRENORPHINE 2 MG: 2 TABLET SUBLINGUAL at 10:43

## 2021-10-11 RX ADMIN — CLONIDINE HYDROCHLORIDE 0.1 MG: 0.1 TABLET ORAL at 20:02

## 2021-10-11 RX ADMIN — SODIUM CHLORIDE: 9 INJECTION, SOLUTION INTRAVENOUS at 07:58

## 2021-10-11 RX ADMIN — Medication 10 ML: at 21:04

## 2021-10-11 RX ADMIN — LORAZEPAM 4 MG: 2 INJECTION INTRAMUSCULAR; INTRAVENOUS at 15:01

## 2021-10-11 RX ADMIN — CLONIDINE HYDROCHLORIDE 0.1 MG: 0.1 TABLET ORAL at 09:23

## 2021-10-11 RX ADMIN — SODIUM CHLORIDE: 9 INJECTION, SOLUTION INTRAVENOUS at 21:04

## 2021-10-11 RX ADMIN — LORAZEPAM 4 MG: 1 TABLET ORAL at 17:59

## 2021-10-11 RX ADMIN — CLONIDINE HYDROCHLORIDE 0.1 MG: 0.1 TABLET ORAL at 17:58

## 2021-10-11 RX ADMIN — BUPRENORPHINE 2 MG: 2 TABLET SUBLINGUAL at 17:58

## 2021-10-11 ASSESSMENT — PAIN SCALES - GENERAL
PAINLEVEL_OUTOF10: 10
PAINLEVEL_OUTOF10: 8
PAINLEVEL_OUTOF10: 7
PAINLEVEL_OUTOF10: 0
PAINLEVEL_OUTOF10: 7
PAINLEVEL_OUTOF10: 0

## 2021-10-11 ASSESSMENT — ENCOUNTER SYMPTOMS
ABDOMINAL PAIN: 0
CHEST TIGHTNESS: 0
SHORTNESS OF BREATH: 0

## 2021-10-11 NOTE — PROGRESS NOTES
1200 : Patient transferred to ICU to keep a closer eye on during withdrawal period. VSS, call light with in reach, will continue to monitor, critical care made aware. 1800: Patient attempting to get out of bed, pulled IV out, states\" put me to sleep or I'm signing out AMA. \" Multiple attempts for IV access, with no success, order for midline received. 1900: Midline being placed.

## 2021-10-11 NOTE — PROGRESS NOTES
4 Eyes Skin Assessment     NAME:  Mack Delacruz  YOB: 1995  MEDICAL RECORD NUMBER:  [id number]    The patient is being assess for  Admission    I agree that 2 RN's have performed a thorough Head to Toe Skin Assessment on the patient. ALL assessment sites listed below have been assessed. Areas assessed by both nurses:    Head, Face, Ears, Shoulders, Back, Chest, Arms, Elbows, Hands, Sacrum. Buttock, Coccyx, Ischium and Legs. Feet and Heels        Does the Patient have a Wound? Yes wound(s) were present on assessment.  LDA wound assessment was Initiated and completed   Pt has abrasions on his upper back, and L leg, injection marks from drug abuse  Thad Prevention initiated:  No   Wound Care Orders initiated:  No    Pressure Injury (Stage 3,4, Unstageable, DTI, NWPT, and Complex wounds) if present place consult order under [de-identified] No    New and Established Ostomies if present place consult order under : No      Nurse 1 eSignature: Electronically signed by Isai Astorga RN on 10/11/21 at 1:10 AM EDT    **SHARE this note so that the co-signing nurse is able to place an eSignature**    Nurse 2 eSignature: Electronically signed by Gunner Mercedes RN on 10/11/21 at 6:41 AM EDT

## 2021-10-11 NOTE — PLAN OF CARE
will improve  Outcome: Ongoing     Problem: Mood - Altered:  Goal: Mood stable  Description: Mood stable  Outcome: Ongoing     Problem: Violence - Risk of, Self/Other-Directed:  Goal: Knowledge of developmental care interventions  Description: Absence of violence  Outcome: Ongoing

## 2021-10-11 NOTE — PROGRESS NOTES
Pt arrived via stretcher from ED to room 5121. Heart monitor connected and verified with CMU box 37, Sinus Wallace. VS, assessment, and admission complete. 4 eyes assessment complete. Pt oriented to unit and room. Call light and bedside table in reach. All questions answered. Pt resting quietly in bed with no complaints or voiced needs at this time. Will continue to monitor.

## 2021-10-11 NOTE — PROGRESS NOTES
At the start of my shift patient was alert and oriented. Sitting up in the bed talking and appropriate with staff. Patient stated  he can feel the withdrawal coming on. He is feeling nauseated and having some GI upset. Physician notified. COWS protocol initiated. Patient given subutex and clonidine, see eMAR. Upon reevaluating patient is profusely sweating, thrashing around in the bed. subutex given for the second time. Patient refused clonidine at this time. Patient stated he cannot swallow a pill. Physician notified. Ativan ordered and given to patient. Upon another reevaluation patient given subutex then spit it back out. Patient again refusing clonidine. Physician notified and patient to transfer to ICU. Patient requested his brother Charmaine Klinefelter be called. I called, no answer and voice mailbox was not set up. Report given to ICU RN. Patient transferred to ICU with RN x2 on telemetry monitor.      Electronically signed by Dereck Freeman RN on 10/11/2021 at 12:11 PM

## 2021-10-11 NOTE — H&P
Hospital Medicine History & Physical      PCP: No primary care provider on file. Date of Admission: 10/10/2021    Date of Service: Pt seen/examined on 10/10/2021 and Admitted to Inpatient     Chief Complaint: Inappropriate drug use, syncope      History Of Present Illness: The patient is a 31 y.o. male with past medical history as below who presents to [facility] with concern that he had a syncopal episode in his uncle's truck after apparently shooting up with some possible heroin/fentanyl mixed with an unknown substance, he feels that his bath salts. After using it, he feels that after about 5 minutes he apparently passed out but he is uncertain as to how long. He feels that it could have been upwards of 3 hours. His uncle found him passed out in the car. He notes that his uncle was working in the car at the time but he does not feel that this was due to fuel or fume exposure. Patient denies any other recent symptoms of fever, chills, dizziness, syncope, chest pain, shortness of breath, dysuria, palpitations, leg swelling, rashes, sick contacts, dental pain/nausea/vomiting/diarrhea. Patient seems fatigued at this time, but he notes he has never had any other previous infections of note related to his IV drug abuse. He has never had any cardiological issues in the past either. Past Medical History:        Diagnosis Date    ADHD (attention deficit hyperactivity disorder)     Cyst     testicles    Depression     Drug abuse, IV ([facility] [address]     heroine    Hemorrhoids     Hepatitis C     Heroin use        Past Surgical History:        Procedure Laterality Date    NASAL SEPTUM SURGERY      TONSILLECTOMY      TYMPANOSTOMY TUBE PLACEMENT         Medications Prior to Admission:    Prior to Admission medications    Medication Sig Start Date End Date Taking? Authorizing Provider   ibuprofen (IBU) 400 MG tablet Take 1 tablet by mouth every 6 hours as needed for Pain 2/08/40   Pascual Licea MD       Allergies:  Clindamycin, Clindamycin/lincomycin, and Azithromycin    Social History:  The patient currently lives home    TOBACCO:   reports that he has been smoking cigarettes. He has a 6.00 pack-year smoking history. His smokeless tobacco use includes chew. ETOH:   reports no history of alcohol use. Family History:  Reviewed in detail and negative for DM, Early CAD, Cancer, CVA. Positive as follows:        Problem Relation Age of Onset    Diabetes Mother     Depression Mother     Substance Abuse Mother     High Blood Pressure Maternal Grandmother     Mental Illness Father     Substance Abuse Brother        REVIEW OF SYSTEMS:    as noted in the HPI. All other systems reviewed and negative. PHYSICAL EXAM:    /67   Pulse (!) 44   Temp 97.5 °F (36.4 °C) (Axillary)   Resp 14   Ht 5' 10.5\" (1.791 m)   Wt 130 lb 15.3 oz (59.4 kg)   SpO2 98%   BMI 18.52 kg/m²     General appearance: Fatigued appearing, somewhat ill-appearing but not toxic, no acute respiratory distress, alert and oriented x4 but seems fatigued  HEENT Normal cephalic, atraumatic without obvious deformity. Pupils equal, round, and reactive to light. Extra ocular muscles intact. Conjunctivae/corneas clear. Dry mucous membrane  Neck: Supple, no JVD, no bruit  Lungs: Clear to auscultation, bilaterally without Rales/Wheezes/Rhonchi with good respiratory effort. Heart: Bradycardic, seems regular, no murmurs noted, S1-S2 noted  Abdomen: Soft, non-tender or non-distended without rigidity or guarding and positive bowel sounds all four quadrants. Extremities: No edema  Skin: No rashes, no areas of abscesses noted  Neurologic: Grossly intact neurologic  Mental status: Alert, oriented, thought content appropriate.   Capillary Refill: Acceptable  < 3 seconds  Peripheral Pulses: +3 Easily felt, not easily obliterated with pressure    Chest x-ray: No acute process    EKG: Multiple EKG showed some J-point elevation in leads V2/V3 and V4, do not feel that these are ST elevations at this time, repeat EKGs show consistency in these areas, there is some flipping of the T wave in V1 but again overall EKG seems stable  CBC   Recent Labs     10/10/21  1756   WBC 6.2   HGB 12.9*   HCT 38.4*         RENAL  Recent Labs     10/10/21  1756      K 4.6   CL 97*   CO2 30   BUN 8   CREATININE 0.7*     LFT'S  Recent Labs     10/10/21  1756   AST 35   ALT 75*   BILITOT 0.4   ALKPHOS 97     COAG  No results for input(s): INR in the last 72 hours.   CARDIAC ENZYMES  Recent Labs     10/10/21  1756 10/10/21  2055 10/10/21  2342   TROPONINI <0.01 <0.01 <0.01       U/A:    Lab Results   Component Value Date    COLORU Yellow 09/15/2021    WBCUA None seen 09/15/2021    RBCUA None seen 09/15/2021    MUCUS 2+ 09/15/2021    BACTERIA 1+ 11/20/2013    CLARITYU Clear 09/15/2021    SPECGRAV >=1.030 09/15/2021    LEUKOCYTESUR Negative 09/15/2021    BLOODU Negative 09/15/2021    GLUCOSEU Negative 09/15/2021       ABG  No results found for: EHN6BWE, BEART, U4VOJUVT, PHART, THGBART, EYC1IRP, PO2ART, VHN0GCY        Active Hospital Problems    Diagnosis Date Noted    Acute electrocardiogram changes [R94.31] 10/10/2021    Drug use [F19.90] 10/10/2021    AMS (altered mental status) [R41.82] 10/10/2021    Syncope [R55] 10/10/2021    Syncope and collapse [R55] 10/10/2021         PHYSICIANS CERTIFICATION:    I certify that Jay Tay is expected to be hospitalized for greater than 2 midnights based on the following assessment and plan:      ASSESSMENT/PLAN:  · Altered mental status  · Syncope  · Drug use  · Acute EKG changes    Plan:  · At this time uncertain as to what patient shot up with into his veins, he feels that it might have been a combination of fentanyl and bath salts which he notes he never is used before, syncopal episodes of uncertain have noted concerning EKG findings but negative troponins  · At this time unable to contact poison control for further guidance due to uncertainty of what patient may have taken  · Keeping patient on telemetry at this time and repeating EKGs throughout the night. Patient seems to have bradycardia but it seems to be responding to stressful situation, soft blood pressures noted but they seem to be stable  · Trending troponins although currently negative and without any noted associated chest pain  · Start patient on normal saline at 100/h for IV fluid hydration  · Continue to monitor telemetry changes in blood pressure changes, patient notably borderline hypotensive and somewhat bradycardic at times but does not seem to be in any major distress  · Ordering transthoracic echo in the morning for further evaluation for EKG changes and syncope  · Repeat labs in the morning    DVT Prophylaxis: Lovenox  Diet: ADULT DIET; Regular  Code Status: Full Code  PT/OT Eval Status: Ambulatory    Dispo -pending clinical course       John Faith, DO    Thank you No primary care provider on file. for the opportunity to be involved in this patient's care. If you have any questions or concerns please feel free to contact me at [phone].

## 2021-10-11 NOTE — CONSULTS
Consult order was from Dr. Courtney Del Real in ED. Dr. Courtney Del Real spoke to Dr. Michelle Lynn, cardiology will not follow unless requested by hospitalist as inpatient.   Clarita Mcintosh MSN, RN [facility] [phone]

## 2021-10-11 NOTE — PROGRESS NOTES
Hospitalist Progress Note      PCP: No primary care provider on file. Date of Admission: 10/10/2021    Chief Complaint:   Drug overdose/ Polysubstance abuse    Hospital Course: The patient is a 31 y.o. male with past medical history as below who presents to [facility] with concern that he had a syncopal episode in his uncle's truck after apparently shooting up with some possible heroin/fentanyl mixed with an unknown substance, he feels that his bath salts. After using it, he feels that after about 5 minutes he apparently passed out but he is uncertain as to how long. He feels that it could have been upwards of 3 hours. His uncle found him passed out in the car. He notes that his uncle was working in the car at the time but he does not feel that this was due to fuel or fume exposure. Patient denies any other recent symptoms of fever, chills, dizziness, syncope, chest pain, shortness of breath, dysuria, palpitations, leg swelling, rashes, sick contacts, dental pain/nausea/vomiting/diarrhea. Patient seems fatigued at this time, but he notes he has never had any other previous infections of note related to his IV drug abuse. He has never had any cardiological issues in the past either.     Subjective:   Says he is feeling drowsy  Sedated currently    Medications:  Reviewed    Infusion Medications    sodium chloride 100 mL/hr at 10/11/21 0759    sodium chloride       Scheduled Medications    sodium chloride flush  5-40 mL IntraVENous 2 times per day    enoxaparin  40 mg SubCUTAneous Daily     PRN Meds: buprenorphine **AND** cloNIDine, sodium chloride flush, sodium chloride, ondansetron **OR** ondansetron, polyethylene glycol, acetaminophen **OR** acetaminophen, perflutren lipid microspheres      Intake/Output Summary (Last 24 hours) at 10/11/2021 1120  Last data filed at 10/11/2021 0759  Gross per 24 hour   Intake 1207.22 ml   Output --   Net 1207.22 ml       Physical Exam Performed:    BP 118/62   Pulse 68   Temp 97.2 °F (36.2 °C) (Oral)   Resp 20   Ht 5' 10.5\" (1.791 m)   Wt 130 lb 15.3 oz (59.4 kg)   SpO2 98%   BMI 18.52 kg/m²     General appearance: No apparent distress, appears stated age and cooperative. Hypersomnolent  HEENT: Pupils equal, round, and reactive to light. Conjunctivae/corneas clear. Neck: Supple, with full range of motion. No jugular venous distention. Trachea midline. Respiratory:  Normal respiratory effort. Clear to auscultation, bilaterally without Rales/Wheezes/Rhonchi. Cardiovascular: Regular rate and rhythm with normal S1/S2 without murmurs, rubs or gallops. Abdomen: Soft, non-tender, non-distended with normal bowel sounds. Musculoskeletal: No clubbing, cyanosis or edema bilaterally. Full range of motion without deformity. Skin: Skin color, texture, turgor normal.  No rashes or lesions. Neurologic:  Neurovascularly intact without any focal sensory/motor deficits. Cranial nerves: II-XII intact, grossly non-focal.  Psychiatric: Alert and oriented, thought content appropriate, normal insight  Capillary Refill: Brisk,3 seconds, normal   Peripheral Pulses: +2 palpable, equal bilaterally       Labs:   Recent Labs     10/10/21  1756 10/11/21  0843   WBC 6.2 5.5   HGB 12.9* 12.3*   HCT 38.4* 36.6*    205     Recent Labs     10/10/21  1756 10/11/21  0843    139   K 4.6 4.4   CL 97* 102   CO2 30 25   BUN 8 8   CREATININE 0.7* 0.6*   CALCIUM 9.6 9.1     Recent Labs     10/10/21  1756 10/11/21  0843   AST 35 31   ALT 75* 65*   BILITOT 0.4 0.5   ALKPHOS 97 92     No results for input(s): INR in the last 72 hours.   Recent Labs     10/10/21  1756 10/10/21  2055 10/10/21  2342   TROPONINI <0.01 <0.01 <0.01       Urinalysis:      Lab Results   Component Value Date    NITRU Negative 09/15/2021    WBCUA None seen 09/15/2021    BACTERIA 1+ 11/20/2013    RBCUA None seen 09/15/2021    BLOODU Negative 09/15/2021    SPECGRAV >=1.030 09/15/2021    GLUCOSEU Negative 09/15/2021       Radiology:  CT HEAD WO CONTRAST   Final Result   No acute intracranial abnormality. XR CHEST PORTABLE   Final Result   No radiographic evidence of acute cardiopulmonary disease. ASSESSMENT:  · Acute metabolic encephalopathy / altered mental status secondary to polysubstance abuse  · Syncope secondary to #1  · Drug use/polysubstance abuse-long-term  · Acute EKG changes- bradycardia with prolonged QTc     PLAN:  · Transfer to ICU  · For possible precedex if very agitated  · COWS protocol  · Tele monitoring  · EKG  · Echo  · Cardiology consult for prolonged QTc/Wallace post polysubstance abuse  · Trending troponins  · Cont normal saline at 100/h for IV fluid hydration     DVT Prophylaxis: Lovenox  Diet: ADULT DIET;  Regular  Code Status: Full Code  PT/OT Eval Status: Ambulatory     Dispo -pending clinical course     Sneha Wright MD

## 2021-10-11 NOTE — PROGRESS NOTES
Patients heart rate has remained Bradycardic throughout night fluctuating between 39-50 bpm.  ordered EKG and was aware of the results, he also wanted a manual BP and was sent the results as requested. Patient remains asymptomatic and with no complaints throughout night.

## 2021-10-11 NOTE — ED PROVIDER NOTES
[facility] 2 PROGRESSIVE CARE  [address] [place] [address]  Dept: [phone]  Loc: [phone]  eMERGENCYdEPARTMENT eNCOUnter      Pt Name: Nory Ordoñez  MRN: [id number]  Nathalia 1995  Date of evaluation: 10/10/2021  Provider:Debbie Gonzalez PA-C    CHIEF COMPLAINT       Chief Complaint   Patient presents with    Drug Overdose     states he thinks he got some bad heroin this am       CRITICAL CARE TIME   Total Critical Care time was 34 minutes, excluding separately reportable procedures. There was a high probability of clinically significant/life threatening deterioration in the patient's condition which required my urgentintervention. HISTORY OF PRESENT ILLNESS  (Location/Symptom, Timing/Onset, Context/Setting, Quality, Duration,Modifying Factors, Severity.)   Mack Garzon is a 31 y.o. male who presents to the emergency department by EMS for drug overdose. Patient has history of heroin abuse. States he injected heroin around 11 AM this morning. He went down to have a 3-hour \"blackout\". He has no recollection of events occurred in this time. When he came to he was sweating and felt extremely hot. He has progressively began having difficulty swallowing and speaking. He was concerned symptoms were progressing and this prompted him to seek evaluation in the ED. He is concerned heroin was laced with unknown substance. He denies any other drug use or alcohol use today. States he had episode of chest discomfort earlier today while having a panic attack, this has resolved. No other complaints this time. Nursing Notes were reviewedand agreed with or any disagreements were addressed in the HPI. REVIEW OF SYSTEMS    (2-9 systems for level 4, 10 or more for level 5)     Review of Systems   Constitutional: Positive for diaphoresis. Negative for chills and fever. HENT: Negative. Respiratory: Negative for chest tightness and shortness of breath. Cardiovascular: Positive for chest pain. Gastrointestinal: Negative for abdominal pain. Genitourinary: Negative. Musculoskeletal: Negative for arthralgias and myalgias. Neurological: Positive for speech difficulty. Negative for dizziness and light-headedness. Psychiatric/Behavioral: Negative for behavioral problems and confusion. Except as noted above the remainder of the review of systems was reviewed and negative. PAST MEDICAL HISTORY         Diagnosis Date    ADHD (attention deficit hyperactivity disorder)     Cyst     testicles    Depression     Drug abuse, IV ([address]     heroine    Hemorrhoids     Hepatitis C     Heroin use        SURGICAL HISTORY           Procedure Laterality Date    NASAL SEPTUM SURGERY      TONSILLECTOMY      TYMPANOSTOMY TUBE PLACEMENT         CURRENT MEDICATIONS     [unfilled]    ALLERGIES     Clindamycin, Clindamycin/lincomycin, and Azithromycin    FAMILY HISTORY           Problem Relation Age of Onset    Diabetes Mother     Depression Mother     Substance Abuse Mother     High Blood Pressure Maternal Grandmother     Mental Illness Father     Substance Abuse Brother      Family Status   Relation Name Status    Mother  (Not Specified)    MGM  (Not Specified)    Father  (Not Specified)    Brother  (Not Specified)        SOCIAL HISTORY      reports that he has been smoking cigarettes. He has a 6.00 pack-year smoking history. His smokeless tobacco use includes chew. He reports current drug use. Frequency: 7.00 times per week. Drugs: Marijuana, IV, and Opiates . He reports that he does not drink alcohol.     PHYSICAL EXAM    (up to 7 for level 4, 8 or more for level 5)     ED Triage Vitals [10/10/21 4788]   Enc Vitals Group      /61      Pulse 62      Resp 18      Temp 97.4 °F (36.3 °C)      Temp Source Oral      SpO2 98 %      Weight 137 lb 12.6 oz (62.5 kg)      Height 5' 10.5\" (1.791 m)      Head Circumference       Peak Flow       Pain Score       Pain Loc       Pain Edu? Excl. in [address]? Physical Exam  Vitals reviewed. HENT:      Head: Normocephalic and atraumatic. Cardiovascular:      Rate and Rhythm: Regular rhythm. Bradycardia present. Pulmonary:      Effort: Pulmonary effort is normal. No respiratory distress. Breath sounds: Normal breath sounds. Abdominal:      Palpations: Abdomen is soft. Tenderness: There is no abdominal tenderness. Musculoskeletal:         General: Normal range of motion. Cervical back: Normal range of motion and neck supple. Skin:     General: Skin is warm. Neurological:      General: No focal deficit present. Mental Status: He is alert and oriented to person, place, and time. Cranial Nerves: No cranial nerve deficit. Coordination: Coordination normal.      Comments: Mild dysarthria   Psychiatric:         Mood and Affect: Mood normal.           DIAGNOSTIC RESULTS     EKG: All EKG's are interpreted by the Emergency Department Physician who either signs or Co-signs this chart in the absence of a cardiologist.    RADIOLOGY:   Non-plain film images such as CT, Ultrasound and MRI are read by the radiologist. Plain radiographic images are visualized and preliminarilyinterpreted by the emergency physician with the below findings:    Interpretation per the Radiologist below,if available at the time of this note:    CT HEAD WO CONTRAST   Final Result   No acute intracranial abnormality. XR CHEST PORTABLE   Final Result   No radiographic evidence of acute cardiopulmonary disease.                LABS:  Labs Reviewed   CBC WITH AUTO DIFFERENTIAL - Abnormal; Notable for the following components:       Result Value    Hemoglobin 12.9 (*)     Hematocrit 38.4 (*)     All other components within normal limits    Narrative:     Performed at:  [address] [address]   Phone [phone]   COMPREHENSIVE METABOLIC PANEL W/ REFLEX TO MG FOR LOW K - Abnormal; Notable for the following components:    Chloride 97 (*)     CREATININE 0.7 (*)     ALT 75 (*)     All other components within normal limits    Narrative:     Performed at:  [address] [facility] [address]   Phone [phone]   SALICYLATE LEVEL - Abnormal; Notable for the following components:    Salicylate, Serum <3.9 (*)     All other components within normal limits    Narrative:     Performed at:  [address] [facility] [address]   Phone [phone]   ACETAMINOPHEN LEVEL - Abnormal; Notable for the following components:    Acetaminophen Level <5 (*)     All other components within normal limits    Narrative:     Performed at:  [address] [facility] [address]   Phone [phone]   TROPONIN    Narrative:     Performed at:  [facility]  [address] [place] [address] [facility] [address]   Phone [phone]   ETHANOL    Narrative:     Performed at:  [facility]  [address] [place] [address] [facility] [address]   Phone [phone]   TROPONIN    Narrative:     Performed at:  [facility]  [address] [place] [address] [facility] [place] [address]   Phone [phone]   MAGNESIUM    Narrative:     Performed at:  [facility]  [address] [place] [address] [facility] [address]   Phone [phone]   TROPONIN    Narrative:     Performed at:  [facility]  [address] [place] [address] [facility] [address]   Phone [phone]   URINE RT REFLEX TO CULTURE   URINE DRUG SCREEN   CBC WITH AUTO DIFFERENTIAL   COMPREHENSIVE METABOLIC PANEL   MAGNESIUM       All other labs were within normal range or not returned as of this dictation.     EMERGENCY DEPARTMENT COURSE and DIFFERENTIAL DIAGNOSIS/MDM:   Vitals:    Vitals:    10/10/21 1901 10/10/21 2000 10/10/21 2133 10/10/21 2236   BP: (!) 90/50 (!) 94/51 (!) 96/52 (!) 84/51   Pulse: (!) 46 (!) 47 (!) 43 54   Resp: 15 12 12 12   Temp:   97.8 °F (36.6 °C) 97.3 °F (36.3 °C)   TempSrc:   Oral Oral   SpO2: 99% 98% 98%    Weight:    130 lb 15.3 oz (59.4 kg)   Height:    5' 10.5\" (1.791 m)       MDM     Patient presents ED with HPI noted above. Given history and symptoms, broad work-up obtained. EKG showed diffuse repolarization changes, most prominent in V3/V4. My attending spoke with on-call cardiologist, Dr. Michelle Lynn who suggest we clinically treat him at this time. Patient does not have any active chest pain or symptoms consistent with ACS at this time. Troponin less than 0.01. We will continue to monitor closely. Throughout ED course patient gradually became hypotensive and had progressive decreased heart rate. IV fluid given. We uncertain of what patient may have taken. Given all this to believe he warrants further inpatient treatment, work-up and evaluation. Remainder of labs as above. CT head obtained without contrast which showed no acute intracranial abnormality. Consultation made hospitalist regarding admission. Dr. Rafia Nuñez kindly admitted patient. CONSULTS:  IP CONSULT TO CARDIOLOGY    PROCEDURES:  Procedures    FINAL IMPRESSION      1. Accidental drug overdose, initial encounter    2. Heroin abuse ([facility] [address]    3. Prolonged Q-T interval on ECG    4. Bradycardia          DISPOSITION/PLAN   @[facility]@    PATIENT REFERRED TO:  No follow-up provider specified.     DISCHARGE MEDICATIONS:  Current Discharge Medication List          (Please note that portions of this note were completed with a voice recognition program.  Efforts were made to edit the dictations but occasionally words are mis-transcribed.)    [facility]          [address] [place] [address]  10/11/21 1917

## 2021-10-11 NOTE — ED NOTES
ED SBAR report provider to Ghassan Flores RN. Patient to be transported to Room 5121 via stretcher by transport tech. Patient transported with bedside cardiac monitor and with IV medications infusing. IV site clean, dry, and intact. MEWS score and pain assessed as denies and documented. Updated patient on plan of care.      Neda Ganser, RN  10/10/21 7295

## 2021-10-12 VITALS
SYSTOLIC BLOOD PRESSURE: 127 MMHG | TEMPERATURE: 98.6 F | DIASTOLIC BLOOD PRESSURE: 71 MMHG | WEIGHT: 130.95 LBS | BODY MASS INDEX: 18.75 KG/M2 | OXYGEN SATURATION: 100 % | RESPIRATION RATE: 17 BRPM | HEART RATE: 52 BPM | HEIGHT: 70 IN

## 2021-10-12 LAB
A/G RATIO: 1.4 (ref 1.1–2.2)
ALBUMIN SERPL-MCNC: 3.7 G/DL (ref 3.4–5)
ALP BLD-CCNC: 88 U/L (ref 40–129)
ALT SERPL-CCNC: 52 U/L (ref 10–40)
ANION GAP SERPL CALCULATED.3IONS-SCNC: 11 MMOL/L (ref 3–16)
AST SERPL-CCNC: 28 U/L (ref 15–37)
BASOPHILS ABSOLUTE: 0 K/UL (ref 0–0.2)
BASOPHILS RELATIVE PERCENT: 0.2 %
BILIRUB SERPL-MCNC: 0.5 MG/DL (ref 0–1)
BUN BLDV-MCNC: 10 MG/DL (ref 7–20)
CALCIUM SERPL-MCNC: 8.9 MG/DL (ref 8.3–10.6)
CHLORIDE BLD-SCNC: 103 MMOL/L (ref 99–110)
CO2: 24 MMOL/L (ref 21–32)
CREAT SERPL-MCNC: 0.6 MG/DL (ref 0.9–1.3)
EOSINOPHILS ABSOLUTE: 0 K/UL (ref 0–0.6)
EOSINOPHILS RELATIVE PERCENT: 0.4 %
GFR AFRICAN AMERICAN: >60
GFR NON-AFRICAN AMERICAN: >60
GLOBULIN: 2.7 G/DL
GLUCOSE BLD-MCNC: 105 MG/DL (ref 70–99)
HCT VFR BLD CALC: 34.8 % (ref 40.5–52.5)
HEMOGLOBIN: 11.8 G/DL (ref 13.5–17.5)
LYMPHOCYTES ABSOLUTE: 1.6 K/UL (ref 1–5.1)
LYMPHOCYTES RELATIVE PERCENT: 20.3 %
MAGNESIUM: 1.8 MG/DL (ref 1.8–2.4)
MCH RBC QN AUTO: 27.3 PG (ref 26–34)
MCHC RBC AUTO-ENTMCNC: 34 G/DL (ref 31–36)
MCV RBC AUTO: 80.2 FL (ref 80–100)
MONOCYTES ABSOLUTE: 0.7 K/UL (ref 0–1.3)
MONOCYTES RELATIVE PERCENT: 8.9 %
NEUTROPHILS ABSOLUTE: 5.4 K/UL (ref 1.7–7.7)
NEUTROPHILS RELATIVE PERCENT: 70.2 %
PDW BLD-RTO: 13.3 % (ref 12.4–15.4)
PLATELET # BLD: 226 K/UL (ref 135–450)
PMV BLD AUTO: 7.5 FL (ref 5–10.5)
POTASSIUM SERPL-SCNC: 3.8 MMOL/L (ref 3.5–5.1)
RBC # BLD: 4.33 M/UL (ref 4.2–5.9)
SODIUM BLD-SCNC: 138 MMOL/L (ref 136–145)
TOTAL PROTEIN: 6.4 G/DL (ref 6.4–8.2)
WBC # BLD: 7.7 K/UL (ref 4–11)

## 2021-10-12 PROCEDURE — 83735 ASSAY OF MAGNESIUM: CPT

## 2021-10-12 PROCEDURE — 80053 COMPREHEN METABOLIC PANEL: CPT

## 2021-10-12 PROCEDURE — 85025 COMPLETE CBC W/AUTO DIFF WBC: CPT

## 2021-10-12 PROCEDURE — 6360000002 HC RX W HCPCS: Performed by: INTERNAL MEDICINE

## 2021-10-12 PROCEDURE — 36415 COLL VENOUS BLD VENIPUNCTURE: CPT

## 2021-10-12 PROCEDURE — 2500000003 HC RX 250 WO HCPCS: Performed by: INTERNAL MEDICINE

## 2021-10-12 PROCEDURE — 6370000000 HC RX 637 (ALT 250 FOR IP): Performed by: INTERNAL MEDICINE

## 2021-10-12 RX ADMIN — LORAZEPAM 4 MG: 2 INJECTION INTRAMUSCULAR; INTRAVENOUS at 03:46

## 2021-10-12 RX ADMIN — CLONIDINE HYDROCHLORIDE 0.1 MG: 0.1 TABLET ORAL at 09:55

## 2021-10-12 RX ADMIN — DEXMEDETOMIDINE HYDROCHLORIDE 0.6 MCG/KG/HR: 4 INJECTION, SOLUTION INTRAVENOUS at 03:20

## 2021-10-12 RX ADMIN — LORAZEPAM 4 MG: 2 INJECTION INTRAMUSCULAR; INTRAVENOUS at 02:00

## 2021-10-12 RX ADMIN — BUPRENORPHINE 2 MG: 2 TABLET SUBLINGUAL at 09:55

## 2021-10-12 ASSESSMENT — PAIN SCALES - GENERAL
PAINLEVEL_OUTOF10: 0

## 2021-10-12 NOTE — CARE COORDINATION
DISCHARGE PLAN: Undetermined. Pt is currently in restraints and agitated. Will need to talk with pt about [facility]/In pt Drug treatment once he is alert and oriented. Pt is homeless. ___________________________________  INITIAL ASSESSMENT  Unable to assess pt as pt is currently in restraints d/t agitation and aggression towards staff. SW attempted to contact pts brotherOpal Favorite. No VM has been established. SW contacted pts \"uncle\" Phani Hurt to address barriers to dc. Phani Licha explained that he was a previous neighbor of this pt and when pts mother overdosed from drugs, he allowed this pt to live with him for a period of time until pt began using drugs. Phani Hurt stated that this pt has been homeless for the past 4 years. HOME: Per Phani Hurt, pt is homeless and \"couch surfs\" or sleeps on the streets. Disease Specific: Unintentional drug overdose. Per chart, pt admitted to using heroin 4-5 times a day for the past 10 years. Phani Hurt stated that pts drug of choice is Xanax. COVID Vaccination: Yes    DME/O2: None noted. ACTIVE SERVICES: No active services in place. Phani Hurt stated that this pt has been to drug treatment 3 times in the past but has not been successful with sustained sobriety. Phani Hurt stated that he has remained in contact with this pt, allowing mail to come to his home and providing transport to pt when he is able. Phani Hurt stated that he sees this pt on average 1x a week, seeing pt last Friday. Phani Hurt stated that the Quick Response Team had contacted this pt after his last hospital discharge in an attempt to get him into drug treatment. Once pt is awake and able to participate in meaningful conversation, the [address] information can again be presented [phone] and a referral (if pt is agreeable) can be made to [facility] [phone]. TRANSPORTATION: Pt has been relying on Phani Hurt for his transportation needs.      PHARMACY: Phani Hurt does not feel that this pt follows up with any prescribed medications. PCP: No PCP    DEMOGRAPHICS: Verified address/phone number as correct    INSURANCE:  [facility]  PRESCRIPTION COVERAGE: [facility] Medicaid    HD/PD: No    THERAPY RECS Not ordered      Discharge planning team will remain available for needs. Please consult for any specifics not addressed in this note.     Miguel Lutz  [phone]  Electronically signed by Juan Jack on 10/12/2021 at 9:59 AM

## 2021-10-12 NOTE — DISCHARGE SUMMARY
Hospital Discharge Summary    Patient's PCP: No primary care provider on file. Admit Date: 10/10/2021   Discharge Date: 10/12/2021 LEFT AMA    Admitting Physician: Dr. Tim Odonnell DO  Discharge Physician: Dr. Antoni Garcia MD   Consults: none    Brief HPI:     31 y. o. male with past medical history as below who presents to [facility] with concern that he had a syncopal episode in his uncle's truck after apparently shooting up with some possible heroin/fentanyl mixed with an unknown substance, he feels that his bath salts.  After using it, he feels that after about 5 minutes he apparently passed out but he is uncertain as to how long. Finesse Valenzuela feels that it could have been upwards of 3 hours.  His uncle found him passed out in the car. Finesse Valenzuela notes that his uncle was working in the car at the time but he does not feel that this was due to fuel or fume exposure. Mateus Reaves denies any other recent symptoms of fever, chills, dizziness, syncope, chest pain, shortness of breath, dysuria, palpitations, leg swelling, rashes, sick contacts, dental pain/nausea/vomiting/diarrhea. . Patient was admitted to the ICU for    Brief hospital course: The patient was admitted to the ICU with acute toxic encephalopathy, agitation associated polysubstance abuse requiring sedation with Precedex and physical restraints. .. This was weaned off within 24 hours. ... Patient left AMA and declined referrals to drug rehab    Invasive procedures:      Discharge Diagnoses: Active Problems:    Acute toxic encephalopathy associated with polysubstance abuse    Acute electrocardiogram changes    Drug use    AMS (altered mental status)    Syncope    Syncope and collapse  Resolved Problems:    * No resolved hospital problems.  *      Physical Exam: /71   Pulse 52   Temp 98.6 °F (37 °C) (Oral)   Resp 17   Ht 5' 10\" (1.778 m)   Wt 130 lb 15.3 oz (59.4 kg)   SpO2 100%   BMI 18.79 kg/m²   Gen/overall appearance: Not in acute distress. Alert. Head: Normocephalic, atraumatic  Eyes: EOMI, good acuity  ENT:- Oral mucosa moist  Neck: No JVD, thyromegaly  CVS: Nml S1S2, no MRG, RRR  Pulm: Clear bilaterally. No crackles/wheezes  Gastrointestinal: Soft, NT/ND, +BS  Musculoskeletal: No edema. Warm  Neuro: No focal deficit. Moves extremity spontaneously. Psychiatry: Appropriate affect. Not agitated. Skin: Warm, dry with normal turgor. No rash        Significant Diagnostic Studies:    See above        Treatments: As above. Discharge Medications:     Medication List      STOP taking these medications    ibuprofen 400 MG tablet  Commonly known as: IBU            Activity: activity as tolerated  Diet: ADULT DIET; Regular      Disposition LEFT AMA  Discharged Condition: Stable  Follow Up:   No follow-up provider specified. Code status:  Full Code         Total time spent on discharge, finalizing medications, referrals and arranging outpatient follow up was more than 45 minutes      Thank you Dr. Ahumada primary care provider on file. for the opportunity to be involved in this patients care.

## 2021-10-12 NOTE — FLOWSHEET NOTE
Readmission Assessment  Number of Days since last admission?: (P) 8-30 days  Previous Disposition: (P) Other (comment) (HOMELESS)  Who is being Interviewed: (P)  (Pts friend, Harinder Delunatirso)  What was the patient's/caregiver's perception as to why they think they needed to return back to the hospital?: (P)  (Pt overdosed on heroin while sitting in a care. Pt was worried that the heroin was laced something.)  Did you visit your Primary Care Physician after you left the hospital, before you returned this time?: (P) No  Why weren't you able to visit your PCP?: (P) Other (Comment) (Pt does not have a PCP)  Did you see a specialist, such as Cardiac, Pulmonary, Orthopedic Physician, etc. after you left the hospital?: (P) No  Who advised the patient to return to the hospital?: (P) Self-referral  Does the patient report anything that got in the way of taking their medications?: (P) Yes  What reasons did they give?: (P) Other (Comment) (Pt does not take any prescripbed medications.   Pts health is impaired by his substance abuse.)  Miguel Kitchen  [phone]  Electronically signed by Norman Mccoy on 10/12/2021 at 10:03 AM

## 2021-10-12 NOTE — PROGRESS NOTES
Pt started on precedex drip and being combative.  Perfect serve sent to hospitalist and pt put in leather restraints

## 2021-10-12 NOTE — PROGRESS NOTES
18- Report received from Anjali Estes. Patient in 4 point roger, agitated screaming and yelling at time of report. 0000- Upon assessment patient is alert and oriented x4. Patient states that he does heroin 4-5 times per day, everyday. He states he has done heroin for 10 years, but would like to stop. 0030-  Precedex titrated appropriately based on patient's agitation. Patient sinus izabella on the monitor from 38-55BPM.     0139- Patient begins screaming out, pulls off his external catheter and begis openly peeing in the bed. He states \"I'm being molested! I'm being raped! \" This RN attempts to get patient covered with a towel. Patient rips off leads and begins biting IV tubing. Wali Delgado RN helps this RN to readjust and resecure leather restraints. 0200- 4mg ativan given. Violent restraint order renewed. 5252- Patient still agitated. 0- Patient wakes up from brief nap to yelling and screaming. Patient yelling Vlad Backbone are you doing this to me bro! \" and \"How do I get out?!\"     [phone]- Patient yelling, biting at staff, being extremely combative. This RN gives 4mg ativan. Patient biting IV tubing attempting to pull out midline. 0400- IV pole repositioned to head of bed to decrease chances of patient pulling IV lines. Midline has been rapped with coban, however patient has attempted to bite and remove coban from around midline. 0405- Patient begins urinating on the bed while yelling. Dr. Rosa Dave perfectserved regarding placing a goodson catheter. 65- Dr. Rosa Dave orders goodson. 0600- Patient remains verbally abusive, constantly asking and attempting to get out or to be taken out of his restraints. Tells this RN he will rip out his midline with his teeth if I do not remove his restraints. 0630- Patient resting calmly covered in blankets asleep.     0700- Report given to [employer].

## 2021-10-12 NOTE — CARE COORDINATION
Discharge Planning:  Pt is refusing any drug rehab/resources and is requesting a LYFT as he is signing out AMA. LYFT arranged: \"Renan Garza is arriving at pickup in 7 min  [phone] [facility]    Schedule return    Edit destination    Marcio ride  Manoj  Full name  [employer] phone number  [phone] type  Lyft (3 seats)  [address] Entrance  Drop-off  [facility] [address], [address] may vary from the original request due to minor pickup and drop-off changes. For example, an address may vary if a rider is picked up across the street.     Name and number  Michelle Villagomez,  [phone]  Make and Model  [place] [address] [employer]  License plate  [id number]  Note to   Come down the hill, turn left at the roundabout-to the main entrance of the hospital.\"  Isabelle Aldrich, [place]  [phone]  Electronically signed by Ale Bowman on 10/12/2021 at 11:22 AM

## 2021-10-13 NOTE — PROGRESS NOTES
This RN administered full 4mg of ativan that was pulled from omnicell by this RN during shift based on elevated CIWA score.      Electronically signed by Saskia Frank RN on 10/13/2021 at 8:13 AM

## 2021-10-18 NOTE — PROGRESS NOTES
Physician Progress Note      PATIENT:               Sony Winslow  CSN #:                  [id number]  :                       1995  ADMIT DATE:       10/10/2021 5:30 PM  Chelsey Neal [place] DATE:        10/12/2021 11:30 AM  RESPONDING  PROVIDER #:        Carito John MD          QUERY TEXT:    Patient admitted with toxic encephalopathy after injecting heroin that he   believed was laced with another substance. Noted documentation of drug   overdose by ED provider, drug use documented in H&P, drug use/polysubstance   abuse documented in progress note on 10/11, and acute toxic encephalopathy   associated with polysubstance abuse documented in Discharge Summary. If possible, please document in progress notes and discharge summary if you   are evaluating and /or treating any of the following: The medical record reflects the following:  Risk Factors: drug use  Clinical Indicators: documentation of drug overdose by ED provider, drug use   documented in H&P, drug use/polysubstance abuse documented in progress note on   10/11, and acute toxic encephalopathy associated with polysubstance abuse   documented in Discharge Summary  Treatment: received IV fluids prior to leaving AMA    Thank you,  Rosita Fabian RN, BSN, CCDS  [email or web address]  Options provided:  -- Drug overdose  -- Polysubstance abuse  -- Other - I will add my own diagnosis  -- Disagree - Not applicable / Not valid  -- Disagree - Clinically unable to determine / Unknown  -- Refer to Clinical Documentation Reviewer    PROVIDER RESPONSE TEXT:    This patient had a drug overdose.     Query created by: Da Tamayo on 10/18/2021 8:31 AM      Electronically signed by:  Carito John MD 10/18/2021 3:22 PM

## 2021-10-20 NOTE — DISCHARGE SUMMARY
Hospital Medicine Discharge Summary    Patient ID: Marilyn Franklin      Patient's PCP: No primary care provider on file. Admit Date: 9/15/2021     Discharge Date: 10/20/2021    Admitting Physician: Amparo Ricardo MD     Discharge Physician: Marcio Gutierrez MD     Discharge Diagnoses: Active Hospital Problems    Diagnosis Date Noted    Encephalopathy [G93.40] 09/15/2021       The patient was seen and examined on day of discharge and this discharge summary is in conjunction with any daily progress note from day of discharge. Condition at discharge - stable    Hospital Course:  patient is a 25-year-old male with past medical history of ADHD, IVDA, hepatitis C who presented to hospital from [facility] ED for overdose with polysubstance abuse.     Assessment  Overdose likely ketamine, benzodiazepines  Rhabdomyolysis     Plan  Continue IV fluids, consulted Psychiatry  Continue Precedex, wean as tolerated, started on Ativan as needed agitation, midazolam as needed agitation, nicotine patch, consulted   Continue DVT prophylaxis with Lovenox    Patient left AMA          Exam:     BP (!) 148/125   Pulse 66   Temp 98.1 °F (36.7 °C) (Axillary)   Resp 19   Ht 6' 1\" (1.854 m)   Wt 137 lb 2 oz (62.2 kg)   SpO2 100%   BMI 18.09 kg/m²     Patient left AMA    Consults:     IP CONSULT TO SOCIAL WORK  IP CONSULT TO PSYCHIATRY      Code Status:  Prior    Activity: activity as tolerated    Labs:  For convenience and continuity at follow-up the following most recent labs are provided:      CBC:    Lab Results   Component Value Date    WBC 7.7 10/12/2021    HGB 11.8 10/12/2021    HCT 34.8 10/12/2021     10/12/2021       Renal:    Lab Results   Component Value Date     10/12/2021    K 3.8 10/12/2021    K 4.6 10/10/2021     10/12/2021    CO2 24 10/12/2021    BUN 10 10/12/2021    CREATININE 0.6 10/12/2021    CALCIUM 8.9 10/12/2021    PHOS 2.5 09/18/2021       Discharge Medications: Discharge Medication List as of 9/18/2021  8:25 AM           Details   ibuprofen (IBU) 400 MG tablet Take 1 tablet by mouth every 6 hours as needed for Pain, Disp-120 tablet, R-0Print             Time Spent on discharge is more than 30 mints in the examination, evaluation, counseling and review of medications and discharge plan. Signed:    Duc Nelson MD   10/20/2021      Thank you No primary care provider on file. for the opportunity to be involved in this patient's care. If you have any questions or concerns please feel free to contact me at [phone].

## 2021-10-22 NOTE — DISCHARGE SUMMARY
DOPPLER LIMITED, CARDIAC COLOR  FLOW TC.  Procedure Date Date: 10/11/2021 Start: 02:04 PM Study Location: [facility] Technical Quality: Limited visualization due to combative patient. Indications:Chest pain. Additional Indications:Polysubstance abuse. Patient Status: Routine Height: 70 inches Weight: 137 pounds BSA: 1.78 m2 BMI: 19.66 kg/m2 HR: 51 bpm BP: 124/60 mmHg  Conclusions   Summary  Limited echo. Normal left ventricle size, wall thickness, and systolic function with an  estimated ejection fraction of 55-60%. No regional wall motion abnormalities  are seen. The right ventricle is not well visualized. Signature   ------------------------------------------------------------------  Electronically signed by Johnathan Camarena MD  (Interpreting physician) on 10/11/2021 at 04:13 PM  ------------------------------------------------------------------   Findings   Left Ventricle  Limited echo. Normal left ventricle size, wall thickness, and systolic function with an  estimated ejection fraction of 55-60%. No regional wall motion abnormalities  are seen. Difficult echo to complete due to poor patient compliance and positioning. Mitral Valve  No evidence of mitral regurgitation or stenosis. Aortic Valve  No vegetation or masses are seen on the aortic valve. Right Ventricle  The right ventricle is not well visualized. Tricuspid Valve  No tricuspid valve vegetation or masses visualized. Pulmonic Valve  No mass or vegetation imaged on the valve.   M-Mode/2D Measurements (cm)   LV Diastolic Dimension: 2.75 cm LV Systolic Dimension: 0.55 cm  LV Septum Diastolic: 8.58 cm  LV PW Diastolic: 2.44 cm        AO Root Dimension: 3.1 cm  Doppler Measurements   PV Peak Velocity: 129 cm/s  PV Peak Gradient: 6.66 mmHg  Aorta   Aortic Root: 3.1 cm      CT HEAD WO CONTRAST    Result Date: 10/10/2021  EXAMINATION: CT OF THE HEAD WITHOUT CONTRAST  10/10/2021 6:02 pm TECHNIQUE: CT of the head was performed without the administration of intravenous contrast. Dose modulation, iterative reconstruction, and/or weight based adjustment of the mA/kV was utilized to reduce the radiation dose to as low as reasonably achievable. COMPARISON: 09/15/2021 HISTORY: ORDERING SYSTEM PROVIDED HISTORY: drug od TECHNOLOGIST PROVIDED HISTORY: Reason for exam:->drug od Has a \"code stroke\" or \"stroke alert\" been called? ->No Decision Support Exception - unselect if not a suspected or confirmed emergency medical condition->Emergency Medical Condition (MA) Reason for Exam: Drug Overdose Acuity: Acute Type of Exam: Initial FINDINGS: BRAIN/VENTRICLES: There is no acute intracranial hemorrhage, mass effect or midline shift. No abnormal extra-axial fluid collection. The gray-white differentiation is maintained without evidence of an acute infarct. There is no evidence of hydrocephalus. ORBITS: The visualized portion of the orbits demonstrate no acute abnormality. SINUSES: The visualized paranasal sinuses and mastoid air cells demonstrate no acute abnormality. SOFT TISSUES/SKULL:  No acute abnormality of the visualized skull or soft tissues. No acute intracranial abnormality. XR CHEST PORTABLE    Result Date: 10/10/2021  EXAMINATION: ONE XRAY VIEW OF THE CHEST 10/10/2021 6:02 pm COMPARISON: Chest 09/15/2021 HISTORY: ORDERING SYSTEM PROVIDED HISTORY: Drug Overdose Acuity: Acute Type of Exam: Initial FINDINGS: The cardiomediastinal and hilar silhouettes appear unremarkable. The lungs appear clear. No pleural effusion evident. No pneumothorax is seen. No acute osseous abnormality is identified. No radiographic evidence of acute cardiopulmonary disease. EKG-None        The patient was seen and examined on day of discharge and this discharge summary is in conjunction with any daily progress note from day of discharge. Time Spent on discharge is 30 minutes  in the examination, evaluation, counseling and review of medications and discharge plan.      Note that more than 30 minutes was spent in preparing discharge papers, discussing discharge with patient, medication review, etc.       Signed:    Tye Harris MD   10/22/2021      Thank you No primary care provider on file. for the opportunity to be involved in this patient's care.  If you have any questions or concerns please feel free to contact me at [facility]

## 2021-12-23 ENCOUNTER — HOSPITAL ENCOUNTER (EMERGENCY)
Age: 26
Discharge: HOME | End: 2021-12-23
Attending: STUDENT IN AN ORGANIZED HEALTH CARE EDUCATION/TRAINING PROGRAM
Payer: MEDICAID

## 2021-12-23 ENCOUNTER — APPOINTMENT (OUTPATIENT)
Dept: GENERAL RADIOLOGY | Age: 26
End: 2021-12-23
Payer: MEDICAID

## 2021-12-23 VITALS
SYSTOLIC BLOOD PRESSURE: 123 MMHG | OXYGEN SATURATION: 98 % | HEIGHT: 69 IN | BODY MASS INDEX: 21.22 KG/M2 | HEART RATE: 72 BPM | TEMPERATURE: 97.7 F | RESPIRATION RATE: 18 BRPM | WEIGHT: 143.3 LBS | DIASTOLIC BLOOD PRESSURE: 78 MMHG

## 2021-12-23 DIAGNOSIS — M79.661 PAIN OF RIGHT LOWER LEG: Primary | ICD-10-CM

## 2021-12-23 LAB
A/G RATIO: 1.4 (ref 1.1–2.2)
ALBUMIN SERPL-MCNC: 4 G/DL (ref 3.4–5)
ALP BLD-CCNC: 97 U/L (ref 40–129)
ALT SERPL-CCNC: 8 U/L (ref 10–40)
ANION GAP SERPL CALCULATED.3IONS-SCNC: 11 MMOL/L (ref 3–16)
AST SERPL-CCNC: 16 U/L (ref 15–37)
BASOPHILS ABSOLUTE: 0 K/UL (ref 0–0.2)
BASOPHILS RELATIVE PERCENT: 0.4 %
BILIRUB SERPL-MCNC: 0.4 MG/DL (ref 0–1)
BUN BLDV-MCNC: 9 MG/DL (ref 7–20)
C-REACTIVE PROTEIN: 18.3 MG/L (ref 0–5.1)
CALCIUM SERPL-MCNC: 9.2 MG/DL (ref 8.3–10.6)
CHLORIDE BLD-SCNC: 100 MMOL/L (ref 99–110)
CO2: 28 MMOL/L (ref 21–32)
CREAT SERPL-MCNC: 0.8 MG/DL (ref 0.9–1.3)
EOSINOPHILS ABSOLUTE: 0.1 K/UL (ref 0–0.6)
EOSINOPHILS RELATIVE PERCENT: 1.1 %
GFR AFRICAN AMERICAN: >60
GFR NON-AFRICAN AMERICAN: >60
GLUCOSE BLD-MCNC: 135 MG/DL (ref 70–99)
HCT VFR BLD CALC: 38.1 % (ref 40.5–52.5)
HEMOGLOBIN: 13.1 G/DL (ref 13.5–17.5)
LYMPHOCYTES ABSOLUTE: 1.3 K/UL (ref 1–5.1)
LYMPHOCYTES RELATIVE PERCENT: 16.8 %
MCH RBC QN AUTO: 27.1 PG (ref 26–34)
MCHC RBC AUTO-ENTMCNC: 34.3 G/DL (ref 31–36)
MCV RBC AUTO: 79 FL (ref 80–100)
MONOCYTES ABSOLUTE: 0.5 K/UL (ref 0–1.3)
MONOCYTES RELATIVE PERCENT: 6.9 %
NEUTROPHILS ABSOLUTE: 6 K/UL (ref 1.7–7.7)
NEUTROPHILS RELATIVE PERCENT: 74.8 %
PDW BLD-RTO: 14 % (ref 12.4–15.4)
PLATELET # BLD: 213 K/UL (ref 135–450)
PMV BLD AUTO: 7.6 FL (ref 5–10.5)
POTASSIUM REFLEX MAGNESIUM: 4.7 MMOL/L (ref 3.5–5.1)
RBC # BLD: 4.83 M/UL (ref 4.2–5.9)
SEDIMENTATION RATE, ERYTHROCYTE: 13 MM/HR (ref 0–15)
SODIUM BLD-SCNC: 139 MMOL/L (ref 136–145)
TOTAL PROTEIN: 6.9 G/DL (ref 6.4–8.2)
WBC # BLD: 8 K/UL (ref 4–11)

## 2021-12-23 PROCEDURE — 36415 COLL VENOUS BLD VENIPUNCTURE: CPT

## 2021-12-23 PROCEDURE — 85652 RBC SED RATE AUTOMATED: CPT

## 2021-12-23 PROCEDURE — 6360000002 HC RX W HCPCS: Performed by: STUDENT IN AN ORGANIZED HEALTH CARE EDUCATION/TRAINING PROGRAM

## 2021-12-23 PROCEDURE — 85025 COMPLETE CBC W/AUTO DIFF WBC: CPT

## 2021-12-23 PROCEDURE — 2580000003 HC RX 258: Performed by: STUDENT IN AN ORGANIZED HEALTH CARE EDUCATION/TRAINING PROGRAM

## 2021-12-23 PROCEDURE — 96366 THER/PROPH/DIAG IV INF ADDON: CPT

## 2021-12-23 PROCEDURE — 80053 COMPREHEN METABOLIC PANEL: CPT

## 2021-12-23 PROCEDURE — 86140 C-REACTIVE PROTEIN: CPT

## 2021-12-23 PROCEDURE — 96365 THER/PROPH/DIAG IV INF INIT: CPT

## 2021-12-23 PROCEDURE — 99283 EMERGENCY DEPT VISIT LOW MDM: CPT

## 2021-12-23 PROCEDURE — 73630 X-RAY EXAM OF FOOT: CPT

## 2021-12-23 PROCEDURE — 87040 BLOOD CULTURE FOR BACTERIA: CPT

## 2021-12-23 RX ORDER — CEPHALEXIN 500 MG/1
500 CAPSULE ORAL 2 TIMES DAILY
Qty: 20 CAPSULE | Refills: 0 | Status: SHIPPED | OUTPATIENT
Start: 2021-12-23 | End: 2022-01-02

## 2021-12-23 RX ORDER — SULFAMETHOXAZOLE AND TRIMETHOPRIM 800; 160 MG/1; MG/1
1 TABLET ORAL 2 TIMES DAILY
Qty: 20 TABLET | Refills: 0 | Status: SHIPPED | OUTPATIENT
Start: 2021-12-23 | End: 2022-01-02

## 2021-12-23 RX ADMIN — VANCOMYCIN HYDROCHLORIDE 1250 MG: 1 INJECTION, POWDER, LYOPHILIZED, FOR SOLUTION INTRAVENOUS at 14:20

## 2021-12-23 ASSESSMENT — PAIN DESCRIPTION - PAIN TYPE: TYPE: ACUTE PAIN

## 2021-12-23 ASSESSMENT — PAIN DESCRIPTION - DESCRIPTORS: DESCRIPTORS: ACHING;THROBBING

## 2021-12-23 ASSESSMENT — PAIN DESCRIPTION - PROGRESSION: CLINICAL_PROGRESSION: GRADUALLY WORSENING

## 2021-12-23 ASSESSMENT — PAIN SCALES - GENERAL: PAINLEVEL_OUTOF10: 8

## 2021-12-23 ASSESSMENT — PAIN DESCRIPTION - ORIENTATION: ORIENTATION: RIGHT

## 2021-12-23 ASSESSMENT — PAIN DESCRIPTION - ONSET: ONSET: PROGRESSIVE

## 2021-12-23 ASSESSMENT — PAIN DESCRIPTION - LOCATION: LOCATION: FOOT

## 2021-12-23 NOTE — ED PROVIDER NOTES
Primary Care Physician: No primary care provider on file. Attending Physician: No att. providers found     History   Chief Complaint   Patient presents with    Foot Pain     rt foot pain with redness and swelling past 24hrs  admits to shooting up drugs yesterday in foot        HPI   Mack Vaca is a 31 y.o. male history of hepatitis C, IV drug use, with multiple staph infections who presents complaining of right leg swelling and pain. Patient states that he injected himself and developed redness and is concerned that he might develop MRSA. He stated in the past he has been given Keflex and Bactrim. Denies any fevers chills nausea vomiting chest pain or shortness of breath. He states that he does have to go to court on 7 January and from there he believes he might be in penitentiary for 1 month. Only after then will he start the process of detox.     Past Medical History:   Diagnosis Date    ADHD (attention deficit hyperactivity disorder)     Cyst     testicles    Depression     Drug abuse, IV ([address]     heroine    Hemorrhoids     Hepatitis C     Heroin use         Past Surgical History:   Procedure Laterality Date    NASAL SEPTUM SURGERY      TONSILLECTOMY      TYMPANOSTOMY TUBE PLACEMENT          Family History   Problem Relation Age of Onset    Diabetes Mother     Depression Mother     Substance Abuse Mother     High Blood Pressure Maternal Grandmother     Mental Illness Father     Substance Abuse Brother         Social History     Socioeconomic History    Marital status: Single     Spouse name: None    Number of children: None    Years of education: None    Highest education level: None   Occupational History    Occupation: unemployed   Tobacco Use    Smoking status: Current Every Day Smoker     Packs/day: 1.00     Years: 6.00     Pack years: 6.00     Types: Cigarettes    Smokeless tobacco: Current User     Types: Chew   Vaping Use    Vaping Use: Never used   Substance and Sexual Activity    Alcohol use: No     Comment: social    Drug use: Yes     Frequency: 7.0 times per week     Types: Marijuana (Judith Croft), IV, Opiates      Comment: fentynal    Sexual activity: Yes   Other Topics Concern    None   Social History Narrative    None     Social Determinants of Health     Financial Resource Strain:     Difficulty of Paying Living Expenses: Not on file   Food Insecurity:     Worried About Running Out of Food in the Last Year: Not on file    Mallorie of Food in the Last Year: Not on file   Transportation Needs:     Lack of Transportation (Medical): Not on file    Lack of Transportation (Non-Medical):  Not on file   Physical Activity:     Days of Exercise per Week: Not on file    Minutes of Exercise per Session: Not on file   Stress:     Feeling of Stress : Not on file   Social Connections:     Frequency of Communication with Friends and Family: Not on file    Frequency of Social Gatherings with Friends and Family: Not on file    Attends [employer] Services: Not on file    Active Member of [address] [place] [address] or Organizations: Not on file    Attends Club or Organization Meetings: Not on file    Marital Status: Not on file   Intimate Partner Violence:     Fear of Current or Ex-Partner: Not on file    Emotionally Abused: Not on file    Physically Abused: Not on file    Sexually Abused: Not on file   Housing Stability:     Unable to Pay for Housing in the Last Year: Not on file    Number of [place] in the Last Year: Not on file    Unstable Housing in the Last Year: Not on file        Review of Systems   10 total systems reviewed and found to be negative unless otherwise noted in HPI     Physical Exam   /78   Pulse 72   Temp 97.7 °F (36.5 °C) (Oral)   Resp 18   Ht 5' 9\" (1.753 m)   Wt 143 lb 4.8 oz (65 kg)   SpO2 98%   BMI 21.16 kg/m²      CONSTITUTIONAL: Well appearing, in no acute distress   HEAD: atraumatic, normocephalic   EYES: PERRL, No injection, discharge or scleral icterus. ENT: Moist mucous membranes. NECK: Normal ROM, NO LAD   CARDIOVASCULAR: Regular rate and rhythm. No murmurs or gallop. PULMONARY/CHEST: Airway patent. No retractions. Breath sounds clear with good air entry bilaterally. ABDOMEN: Soft, Non-distended and non-tender, without guarding or rebound. SKIN: Acyanotic, warm, dry   MUSCULOSKELETAL: Right leg is swollen, red and warm to touch but pulses intact  NEUROLOGICAL: Awake and oriented x 3. Pulses intact. Grossly nonfocal   Nursing note and vitals reviewed. ED Course & Medical Decision Making   Medications   vancomycin (VANCOCIN) 1,250 mg in dextrose 5 % 250 mL IVPB (0 mg IntraVENous Stopped 12/23/21 1626)      Labs Reviewed   CBC WITH AUTO DIFFERENTIAL - Abnormal; Notable for the following components:       Result Value    Hemoglobin 13.1 (*)     Hematocrit 38.1 (*)     MCV 79.0 (*)     All other components within normal limits    Narrative:     Performed at:  [facility]  [address] [facility], [facility]   Phone [phone]   COMPREHENSIVE METABOLIC PANEL W/ REFLEX TO MG FOR LOW K - Abnormal; Notable for the following components:    Glucose 135 (*)     CREATININE 0.8 (*)     ALT 8 (*)     All other components within normal limits    Narrative:     Performed at:  [facility]  [address] [facility], [facility]   Phone [phone]   CULTURE, BLOOD 1   CULTURE, BLOOD 2   SEDIMENTATION RATE    Narrative:     Performed at:  [facility]  [address], [facility]   Phone [phone]   C-REACTIVE PROTEIN      XR FOOT RIGHT (MIN 3 VIEWS)   Final Result   No acute osseous findings.               PROCEDURES:   Procedures    ASSESSMENT AND PLAN:  Bridget Cota is a 31 y.o. male history of hepatitis C, IV drug use, with multiple staph infections who presents complaining of right leg swelling and pain. Patient states that he injected himself and developed redness and is concerned that he might develop MRSA. He stated in the past he has been given Keflex and Bactrim. Denies any fevers chills nausea vomiting chest pain or shortness of breath. He states that he does have to go to court on 7 January and from there he believes he might be in longterm for 1 month. Only after then will he start the process of detox. Right foot looks swollen warm tender to touch but pulses intact. His presentation was concerning for cellulitis secondary to IV drug use. Labs obtained and showed no white count normal sed rate. At this time I do not think that his symptoms are systemic. Patient was given a dose of vancomycin in the emergency room discharged home with Bactrim and Keflex with recommendation to follow-up with primary care doctor is    ClINICAL IMPRESSION:  1. Pain of right lower leg          PATIENT REFERRED TO:  Tricia Miguelsheri  [phone]  Schedule an appointment as soon as possible for a visit in 2 days        DISCHARGE MEDICATIONS:  Discharge Medication List as of 12/23/2021  4:31 PM      START taking these medications    Details   sulfamethoxazole-trimethoprim (BACTRIM DS;SEPTRA DS) 800-160 MG per tablet Take 1 tablet by mouth 2 times daily for 10 days, Disp-20 tablet, R-0Print      cephALEXin (KEFLEX) 500 MG capsule Take 1 capsule by mouth 2 times daily for 10 days, Disp-20 capsule, R-0Print           DISCONTINUED MEDICATIONS:  Discharge Medication List as of 12/23/2021  4:31 PM        DISPOSITION    Discharge  -We have instructed the patient, (Mack Simmons) to return to the ED or call his PCP if his pain/symptoms worsen. -Findings and recommendations explained to patient.  He expressed understanding and agreed with the plan.    ___________________________________________________________________________________  _________________________________________________________________________________________  This record is transcribed utilizing voice recognition technology. There are inherent limitations in this technology. In addition, there may be limitations in editing of this report. If there are any discrepancies, please contact me directly.         Magan Miramontes MD  12/23/21 9640

## 2021-12-27 LAB
BLOOD CULTURE, ROUTINE: NORMAL
CULTURE, BLOOD 2: NORMAL

## 2022-03-03 ENCOUNTER — HOSPITAL ENCOUNTER (EMERGENCY)
Age: 27
Discharge: HOME | End: 2022-03-03
Attending: EMERGENCY MEDICINE
Payer: MEDICAID

## 2022-03-03 VITALS
OXYGEN SATURATION: 100 % | BODY MASS INDEX: 20.05 KG/M2 | SYSTOLIC BLOOD PRESSURE: 102 MMHG | RESPIRATION RATE: 16 BRPM | WEIGHT: 135.8 LBS | TEMPERATURE: 97.7 F | HEART RATE: 58 BPM | DIASTOLIC BLOOD PRESSURE: 52 MMHG

## 2022-03-03 DIAGNOSIS — R79.89 ELEVATED LFTS: ICD-10-CM

## 2022-03-03 DIAGNOSIS — F19.90 IV DRUG USER: ICD-10-CM

## 2022-03-03 DIAGNOSIS — L03.119 CELLULITIS OF LOWER EXTREMITY, UNSPECIFIED LATERALITY: Primary | ICD-10-CM

## 2022-03-03 DIAGNOSIS — F11.29 OPIOID DEPENDENCE WITH OPIOID-INDUCED DISORDER (HCC): Chronic | ICD-10-CM

## 2022-03-03 LAB
A/G RATIO: 1.4 (ref 1.1–2.2)
ALBUMIN SERPL-MCNC: 3.8 G/DL (ref 3.4–5)
ALP BLD-CCNC: 139 U/L (ref 40–129)
ALT SERPL-CCNC: 214 U/L (ref 10–40)
ANION GAP SERPL CALCULATED.3IONS-SCNC: 17 MMOL/L (ref 3–16)
AST SERPL-CCNC: 102 U/L (ref 15–37)
BASOPHILS ABSOLUTE: 0 K/UL (ref 0–0.2)
BASOPHILS RELATIVE PERCENT: 0.5 %
BILIRUB SERPL-MCNC: 0.5 MG/DL (ref 0–1)
BUN BLDV-MCNC: 17 MG/DL (ref 7–20)
CALCIUM SERPL-MCNC: 9.5 MG/DL (ref 8.3–10.6)
CHLORIDE BLD-SCNC: 99 MMOL/L (ref 99–110)
CO2: 20 MMOL/L (ref 21–32)
CREAT SERPL-MCNC: 1.1 MG/DL (ref 0.9–1.3)
EOSINOPHILS ABSOLUTE: 0.1 K/UL (ref 0–0.6)
EOSINOPHILS RELATIVE PERCENT: 2 %
GFR AFRICAN AMERICAN: >60
GFR NON-AFRICAN AMERICAN: >60
GLUCOSE BLD-MCNC: 166 MG/DL (ref 70–99)
HCT VFR BLD CALC: 35.6 % (ref 40.5–52.5)
HEMOGLOBIN: 12.1 G/DL (ref 13.5–17.5)
LACTIC ACID, SEPSIS: 2.1 MMOL/L (ref 0.4–1.9)
LYMPHOCYTES ABSOLUTE: 1.1 K/UL (ref 1–5.1)
LYMPHOCYTES RELATIVE PERCENT: 24.4 %
MCH RBC QN AUTO: 27.9 PG (ref 26–34)
MCHC RBC AUTO-ENTMCNC: 34.1 G/DL (ref 31–36)
MCV RBC AUTO: 81.6 FL (ref 80–100)
MONOCYTES ABSOLUTE: 0.6 K/UL (ref 0–1.3)
MONOCYTES RELATIVE PERCENT: 14.5 %
NEUTROPHILS ABSOLUTE: 2.6 K/UL (ref 1.7–7.7)
NEUTROPHILS RELATIVE PERCENT: 58.6 %
PDW BLD-RTO: 14 % (ref 12.4–15.4)
PLATELET # BLD: 125 K/UL (ref 135–450)
PMV BLD AUTO: 7.6 FL (ref 5–10.5)
POTASSIUM REFLEX MAGNESIUM: 4.1 MMOL/L (ref 3.5–5.1)
RBC # BLD: 4.36 M/UL (ref 4.2–5.9)
SODIUM BLD-SCNC: 136 MMOL/L (ref 136–145)
TOTAL PROTEIN: 6.5 G/DL (ref 6.4–8.2)
WBC # BLD: 4.4 K/UL (ref 4–11)

## 2022-03-03 PROCEDURE — 6370000000 HC RX 637 (ALT 250 FOR IP): Performed by: EMERGENCY MEDICINE

## 2022-03-03 PROCEDURE — 80053 COMPREHEN METABOLIC PANEL: CPT

## 2022-03-03 PROCEDURE — 99283 EMERGENCY DEPT VISIT LOW MDM: CPT

## 2022-03-03 PROCEDURE — 85025 COMPLETE CBC W/AUTO DIFF WBC: CPT

## 2022-03-03 PROCEDURE — 83605 ASSAY OF LACTIC ACID: CPT

## 2022-03-03 PROCEDURE — 36415 COLL VENOUS BLD VENIPUNCTURE: CPT

## 2022-03-03 RX ORDER — SULFAMETHOXAZOLE AND TRIMETHOPRIM 800; 160 MG/1; MG/1
1 TABLET ORAL 2 TIMES DAILY
Qty: 14 TABLET | Refills: 0 | Status: SHIPPED | OUTPATIENT
Start: 2022-03-03 | End: 2022-03-10

## 2022-03-03 RX ORDER — CEPHALEXIN 500 MG/1
500 CAPSULE ORAL 4 TIMES DAILY
Qty: 28 CAPSULE | Refills: 0 | Status: SHIPPED | OUTPATIENT
Start: 2022-03-03 | End: 2022-03-10

## 2022-03-03 RX ORDER — CEPHALEXIN 250 MG/1
500 CAPSULE ORAL ONCE
Status: COMPLETED | OUTPATIENT
Start: 2022-03-03 | End: 2022-03-03

## 2022-03-03 RX ORDER — SULFAMETHOXAZOLE AND TRIMETHOPRIM 800; 160 MG/1; MG/1
1 TABLET ORAL ONCE
Status: COMPLETED | OUTPATIENT
Start: 2022-03-03 | End: 2022-03-03

## 2022-03-03 RX ADMIN — SULFAMETHOXAZOLE AND TRIMETHOPRIM 1 TABLET: 800; 160 TABLET ORAL at 02:47

## 2022-03-03 RX ADMIN — CEPHALEXIN 500 MG: 250 CAPSULE ORAL at 02:47

## 2022-03-03 NOTE — DISCHARGE INSTR - COC
Continuity of Care Form    Patient Name: Bertha Acuna   :  1995  MRN:  [id number]    Admit date:  3/3/2022  Discharge date:  ***    Code Status Order: Prior   Advance Directives:      Admitting Physician:  No admitting provider for patient encounter. PCP: No primary care provider on file. Discharging Nurse: [facility] Unit/Room#: QC   Discharging Unit Phone Number: ***    Emergency Contact:   Extended Emergency Contact Information  Primary Emergency Contact: [address] [place] [address] Phone: [phone]  Relation: Aunt/Uncle   needed?  No  Secondary Emergency Contact: Allyson Hurtado Phone: [phone]  Mobile Phone: [phone]  Relation: Brother/Sister    Past Surgical History:  Past Surgical History:   Procedure Laterality Date    NASAL SEPTUM SURGERY      TONSILLECTOMY      TYMPANOSTOMY TUBE PLACEMENT         Immunization History:   Immunization History   Administered Date(s) Administered    COVID-19, J&J, PF, 0.5 mL 2021    Tdap (Boostrix, Adacel) 2021       Active Problems:  Patient Active Problem List   Diagnosis Code    Substance induced mood disorder ([facility] F19.94    Opiate addiction ([facility] F11.20    Encephalopathy G93.40    Acute electrocardiogram changes R94.31    Drug use F19.90    AMS (altered mental status) R41.82    Syncope R55    Syncope and collapse R55       Isolation/Infection:   Isolation            No Isolation          Patient Infection Status       None to display            Nurse Assessment:  Last Vital Signs: BP (!) 108/42   Pulse 54   Temp 97.7 °F (36.5 °C) (Oral)   Resp 14   Wt 135 lb 12.9 oz (61.6 kg)   SpO2 98%   BMI 20.05 kg/m²     Last documented pain score (0-10 scale):    Last Weight:   Wt Readings from Last 1 Encounters:   22 135 lb 12.9 oz (61.6 kg)     Mental Status:  {IP PT MENTAL STATUS:}    IV Access:  { MICHAEL IV ACCESS:544364615}    Nursing Mobility/ADLs:  Walking   {[facility] DME WTGC:167767656}  Transfer  {[facility] DME TFOI:505358254}  Bathing  {[facility] DME ZSGW:307182057}  Dressing  {[facility] DME FPXL:733007897}  Toileting  {[facility] DME NNUB:488005197}  Feeding  {[facility] DME ALSJ:371931779}  Med Admin  {[facility] DME IRFH:695394669}  Med Delivery   { MICHAEL MED Delivery:169032456}    Wound Care Documentation and Therapy:  Wound 10/10/21 Back Medial;Upper (Active)   Number of days: 143       Wound 10/10/21 Pretibial Distal;Left (Active)   Number of days: 143        Elimination:  Continence: Bowel: {YES / CK:82815}  Bladder: {YES / KV:42511}  Urinary Catheter: {Urinary Catheter:172093414}   Colostomy/Ileostomy/Ileal Conduit: {YES / NJ:18048}       Date of Last BM: ***  No intake or output data in the 24 hours ending 22 0439  No intake/output data recorded.     Safety Concerns:     [employer] Safety Concerns:207330627}    Impairments/Disabilities:      [employer] Impairments/Disabilities:602011541}    Nutrition Therapy:  Current Nutrition Therapy:   [employer] Diet List:403268944}    Routes of Feeding: {[facility] DME Other Feedings:645601166}  Liquids: {Slp liquid thickness:56392}  Daily Fluid Restriction: {[facility] DME Yes amt example:973824081}  Last Modified Barium Swallow with Video (Video Swallowing Test): {Done Not Done VKTK:932210587}    Treatments at the Time of Hospital Discharge:   Respiratory Treatments: ***  Oxygen Therapy:  {Therapy; copd oxygen:44715}  Ventilator:    {[facility] Vent NPOV:879757080}    Rehab Therapies: {THERAPEUTIC INTERVENTION:7612816161}  Weight Bearing Status/Restrictions: [employer] Weight [employer]}  Other Medical Equipment (for information only, NOT a DME order):  {EQUIPMENT:710253893}  Other Treatments: ***    Patient's personal belongings (please select all that are sent with patient):  {[facility] DME Belongings:065688103}    RN SIGNATURE:  {Esignature:546637718}    CASE MANAGEMENT/SOCIAL WORK SECTION    Inpatient Status Date: ***    Readmission Risk Assessment Score:  Readmission Risk              Risk of Unplanned Readmission:  0 Discharging to Facility/ Agency   Name:   Address:  Phone:  Fax:    Dialysis Facility (if applicable)   Name:  Address:  Dialysis Schedule:  Phone:  Fax:    / signature: {Esignature:081234204}    PHYSICIAN SECTION    Prognosis: {Prognosis:8917880063}    Condition at Discharge: Juancarlos Sahni Patient Condition:481113886}    Rehab Potential (if transferring to Rehab): {Prognosis:9738917953}    Recommended Labs or Other Treatments After Discharge: ***    Physician Certification: I certify the above information and transfer of Mack Hardwick  is necessary for the continuing treatment of the diagnosis listed and that he requires {Admit to Appropriate Level of Care:84409} for {GREATER/LESS:672516306} 30 days.      Update Admission H&P: {[facility] DME Changes in BUWMY:126500031}    PHYSICIAN SIGNATURE:  {Esignature:606705908}

## 2022-03-03 NOTE — ED PROVIDER NOTES
CHIEF COMPLAINT  Wound Check (wounds on his feet)      HISTORY OF PRESENT ILLNESS  Mack Prather is a 31 y.o. male presents to the ED with foot wounds from shooting up IV drugs, worsening the past few days, no purulent drainage, but multiple open sores, states they were opiates he injected, he typically uses heroin, does have history of hep C, no known fevers, states he went to  they tried to admit him to the hospital, he wants to come in today for IV antibiotics and go home with oral antibiotics, no nausea/vomiting/diarrhea, no abdominal pain, no headache or neck stiffness, no urinary changes, no other complaints, modifying factors or associated symptoms. I have reviewed the following from the nursing documentation.     Past Medical History:   Diagnosis Date    ADHD (attention deficit hyperactivity disorder)     Cyst     testicles    Depression     Drug abuse, IV ([address]     heroine    Hemorrhoids     Hepatitis C     Heroin use      Past Surgical History:   Procedure Laterality Date    NASAL SEPTUM SURGERY      TONSILLECTOMY      TYMPANOSTOMY TUBE PLACEMENT       Family History   Problem Relation Age of Onset    Diabetes Mother     Depression Mother     Substance Abuse Mother     High Blood Pressure Maternal Grandmother     Mental Illness Father     Substance Abuse Brother      Social History     Socioeconomic History    Marital status: Single     Spouse name: Not on file    Number of children: Not on file    Years of education: Not on file    Highest education level: Not on file   Occupational History    Occupation: unemployed   Tobacco Use    Smoking status: Current Every Day Smoker     Packs/day: 1.00     Years: 6.00     Pack years: 6.00     Types: Cigarettes    Smokeless tobacco: Current User     Types: Chew   Vaping Use    Vaping Use: Never used   Substance and Sexual Activity    Alcohol use: No     Comment: social    Drug use: Yes     Frequency: 7.0 times per week     Types: Marijuana (Rosaura Mad), IV, Opiates      Comment: fentynal    Sexual activity: Yes   Other Topics Concern    Not on file   Social History Narrative    Not on file     Social Determinants of Health     Financial Resource Strain:     Difficulty of Paying Living Expenses: Not on file   Food Insecurity:     Worried About Running Out of Food in the Last Year: Not on file    Mallorie of Food in the Last Year: Not on file   Transportation Needs:     Lack of Transportation (Medical): Not on file    Lack of Transportation (Non-Medical): Not on file   Physical Activity:     Days of Exercise per Week: Not on file    Minutes of Exercise per Session: Not on file   Stress:     Feeling of Stress : Not on file   Social Connections:     Frequency of Communication with Friends and Family: Not on file    Frequency of Social Gatherings with Friends and Family: Not on file    Attends Worship Services: Not on file    Active Member of [address] [place] [address] or Organizations: Not on file    Attends Club or Organization Meetings: Not on file    Marital Status: Not on file   Intimate Partner Violence:     Fear of Current or Ex-Partner: Not on file    Emotionally Abused: Not on file    Physically Abused: Not on file    Sexually Abused: Not on file   Housing Stability:     Unable to Pay for Housing in the Last Year: Not on file    Number of [place] in the Last Year: Not on file    Unstable Housing in the Last Year: Not on file     No current facility-administered medications for this encounter.      Current Outpatient Medications   Medication Sig Dispense Refill    cephALEXin (KEFLEX) 500 MG capsule Take 1 capsule by mouth 4 times daily for 7 days 28 capsule 0    sulfamethoxazole-trimethoprim (BACTRIM DS) 800-160 MG per tablet Take 1 tablet by mouth 2 times daily for 7 days 14 tablet 0     Allergies   Allergen Reactions    Clindamycin     Clindamycin/Lincomycin Itching    Azithromycin Itching       REVIEW OF SYSTEMS  10 systems reviewed, pertinent positives per HPI otherwise noted to be negative. PHYSICAL EXAM  BP (!) 102/52   Pulse 58   Temp 97.7 °F (36.5 °C) (Oral)   Resp 16   Wt 135 lb 12.9 oz (61.6 kg)   SpO2 100%   BMI 20.05 kg/m²   GENERAL APPEARANCE: Awake and alert. Cooperative. No acute distress, appears older than stated age, appears fatigued  HEAD: Normocephalic. Atraumatic. EYES: PERRL. EOM's grossly intact. ENT: Mucous membranes are moist.  Airway patent, no stridor  NECK: Supple. No rigidity  HEART: RRR. No murmurs  LUNGS: Respirations unlabored. Lungs are clear to auscultation bilaterally. ABDOMEN: Soft. Non-distended. Non-tender. No guarding or rebound. EXTREMITIES: No peripheral edema. Moves all extremities equally. All extremities neurovascularly intact. 2+ DP PT pulses, distal sensation intact in all digits, less than 2-second cap refill all digits, sores/healing ulcerations/erythematous areas on the dorsal and medial feet, no significant tenderness to palpation  SKIN: Warm and dry. No acute rashes but, skin lesions, appears to be skin picking as well. See photo below  NEUROLOGICAL: Alert and oriented. No gross facial drooping. Normal speech, steady gait  PSYCHIATRIC: Normal mood and affect. Right foot        Left foot        LABS  I have reviewed all labs for this visit.    Results for orders placed or performed during the hospital encounter of 03/03/22   CBC with Auto Differential   Result Value Ref Range    WBC 4.4 4.0 - 11.0 K/uL    RBC 4.36 4.20 - 5.90 M/uL    Hemoglobin 12.1 (L) 13.5 - 17.5 g/dL    Hematocrit 35.6 (L) 40.5 - 52.5 %    MCV 81.6 80.0 - 100.0 fL    MCH 27.9 26.0 - 34.0 pg    MCHC 34.1 31.0 - 36.0 g/dL    RDW 14.0 12.4 - 15.4 %    Platelets 650 (L) 944 - 450 K/uL    MPV 7.6 5.0 - 10.5 fL    Neutrophils % 58.6 %    Lymphocytes % 24.4 %    Monocytes % 14.5 %    Eosinophils % 2.0 %    Basophils % 0.5 %    Neutrophils Absolute 2.6 1.7 - 7.7 K/uL    Lymphocytes Absolute 1.1 1.0 - 5.1 K/uL    Monocytes Absolute 0.6 0.0 - 1.3 K/uL    Eosinophils Absolute 0.1 0.0 - 0.6 K/uL    Basophils Absolute 0.0 0.0 - 0.2 K/uL   Lactate, Sepsis   Result Value Ref Range    Lactic Acid, Sepsis 2.1 (H) 0.4 - 1.9 mmol/L   Comprehensive Metabolic Panel w/ Reflex to MG   Result Value Ref Range    Sodium 136 136 - 145 mmol/L    Potassium reflex Magnesium 4.1 3.5 - 5.1 mmol/L    Chloride 99 99 - 110 mmol/L    CO2 20 (L) 21 - 32 mmol/L    Anion Gap 17 (H) 3 - 16    Glucose 166 (H) 70 - 99 mg/dL    BUN 17 7 - 20 mg/dL    CREATININE 1.1 0.9 - 1.3 mg/dL    GFR Non-African American >60 >60    GFR African American >60 >60    Calcium 9.5 8.3 - 10.6 mg/dL    Total Protein 6.5 6.4 - 8.2 g/dL    Albumin 3.8 3.4 - 5.0 g/dL    Albumin/Globulin Ratio 1.4 1.1 - 2.2    Total Bilirubin 0.5 0.0 - 1.0 mg/dL    Alkaline Phosphatase 139 (H) 40 - 129 U/L     (H) 10 - 40 U/L     (H) 15 - 37 U/L         ED COURSE/MDM  Patient seen and evaluated. Old records reviewed. Labs and imaging reviewed and results discussed with patient.    50-year-old male with IV drug use and injection site infections and wounds with poor healing, who was agreeable to do lab work, white count normal, he is afebrile nontoxic-appearing, able to tolerate p.o., given primary care follow-up for recheck, started Bactrim/Keflex, since he stated he was supposed to be admitted, I discussed this option with him, But he did not want to stay in the hospital, extremities neurovascularly intact, no visible/obvious abscesses to drain, I explained that one-time doses of IV antibiotics have not been shown to be beneficial over starting p.o. promptly, elevated LFTs, he has known history of IV drug use, hep C, opioid dependence, low suspicion for significant cellulitis/nec fasc, No Current Suspicion for Sepsis, strict return precautions given, he states he cannot miss work, all questions answered, will return if any worsening symptoms or new concerns, see AVS for further discharge information, patient verbalized understanding of plan, felt comfortable going home. Orders Placed This Encounter   Procedures    CBC with Auto Differential    Comprehensive Metabolic Panel w/ Reflex to MG    Referral for No Primary Care Physician - Urgent     Orders Placed This Encounter   Medications    sulfamethoxazole-trimethoprim (BACTRIM DS;SEPTRA DS) 800-160 MG per tablet 1 tablet     Order Specific Question:   Antimicrobial Indications     Answer:   Skin and Soft Tissue Infection    cephALEXin (KEFLEX) capsule 500 mg     Order Specific Question:   Antimicrobial Indications     Answer:   Skin and Soft Tissue Infection    cephALEXin (KEFLEX) 500 MG capsule     Sig: Take 1 capsule by mouth 4 times daily for 7 days     Dispense:  28 capsule     Refill:  0    sulfamethoxazole-trimethoprim (BACTRIM DS) 800-160 MG per tablet     Sig: Take 1 tablet by mouth 2 times daily for 7 days     Dispense:  14 tablet     Refill:  0            CLINICAL IMPRESSION  1. Cellulitis of lower extremity, unspecified laterality    2. IV drug user    3. Opioid dependence with opioid-induced disorder (HCC)    4. Elevated LFTs        Blood pressure (!) 102/52, pulse 58, temperature 97.7 °F (36.5 °C), temperature source Oral, resp. rate 16, weight 135 lb 12.9 oz (61.6 kg), SpO2 100 %. Arvid Nations was discharged to home in stable condition.                    Garett Philip,   03/09/22 5505

## 2022-03-03 NOTE — ED NOTES
Patient presents to ED with multiple foot wounds. He states he last shot heroin in his foot yesterday. Patient is afebrile. Calm and cooperative with care.       Eloina Nichols RN  03/03/22 2338

## 2022-03-28 ENCOUNTER — HOSPITAL ENCOUNTER (EMERGENCY)
Age: 27
Discharge: HOME | End: 2022-03-28
Attending: EMERGENCY MEDICINE
Payer: MEDICAID

## 2022-03-28 ENCOUNTER — APPOINTMENT (OUTPATIENT)
Dept: GENERAL RADIOLOGY | Age: 27
End: 2022-03-28
Payer: MEDICAID

## 2022-03-28 VITALS
DIASTOLIC BLOOD PRESSURE: 83 MMHG | TEMPERATURE: 98.8 F | OXYGEN SATURATION: 99 % | SYSTOLIC BLOOD PRESSURE: 139 MMHG | RESPIRATION RATE: 17 BRPM | HEART RATE: 76 BPM

## 2022-03-28 DIAGNOSIS — L03.115 CELLULITIS OF FOOT, RIGHT: Primary | ICD-10-CM

## 2022-03-28 PROCEDURE — 99284 EMERGENCY DEPT VISIT MOD MDM: CPT

## 2022-03-28 PROCEDURE — 6370000000 HC RX 637 (ALT 250 FOR IP): Performed by: EMERGENCY MEDICINE

## 2022-03-28 PROCEDURE — 73630 X-RAY EXAM OF FOOT: CPT

## 2022-03-28 RX ORDER — CEPHALEXIN 250 MG/1
1000 CAPSULE ORAL ONCE
Status: COMPLETED | OUTPATIENT
Start: 2022-03-28 | End: 2022-03-28

## 2022-03-28 RX ORDER — SULFAMETHOXAZOLE AND TRIMETHOPRIM 800; 160 MG/1; MG/1
1 TABLET ORAL 2 TIMES DAILY
Qty: 14 TABLET | Refills: 0 | Status: SHIPPED | OUTPATIENT
Start: 2022-03-28 | End: 2022-03-31 | Stop reason: SDUPTHER

## 2022-03-28 RX ORDER — CEPHALEXIN 500 MG/1
500 CAPSULE ORAL 3 TIMES DAILY
Qty: 30 CAPSULE | Refills: 0 | Status: SHIPPED | OUTPATIENT
Start: 2022-03-28 | End: 2022-04-07

## 2022-03-28 RX ORDER — IBUPROFEN 800 MG/1
800 TABLET ORAL ONCE
Status: COMPLETED | OUTPATIENT
Start: 2022-03-28 | End: 2022-03-28

## 2022-03-28 RX ADMIN — IBUPROFEN 800 MG: 800 TABLET, FILM COATED ORAL at 08:57

## 2022-03-28 RX ADMIN — CEPHALEXIN 1000 MG: 250 CAPSULE ORAL at 08:57

## 2022-03-28 ASSESSMENT — PAIN DESCRIPTION - DESCRIPTORS
DESCRIPTORS: CONSTANT
DESCRIPTORS: CONSTANT;THROBBING

## 2022-03-28 ASSESSMENT — PAIN SCALES - GENERAL
PAINLEVEL_OUTOF10: 5

## 2022-03-28 ASSESSMENT — PAIN DESCRIPTION - LOCATION: LOCATION: FOOT

## 2022-03-28 ASSESSMENT — PAIN DESCRIPTION - PAIN TYPE: TYPE: ACUTE PAIN

## 2022-03-28 ASSESSMENT — PAIN DESCRIPTION - ORIENTATION: ORIENTATION: RIGHT

## 2022-03-28 ASSESSMENT — PAIN DESCRIPTION - PROGRESSION: CLINICAL_PROGRESSION: NOT CHANGED

## 2022-03-28 NOTE — ED PROVIDER NOTES
eMERGENCY dEPARTMENT eNCOUnter      Pt Name: Rocío Godinez  MRN: [id number]  Nickgfrichard 1995  Date of evaluation: 3/28/2022  Provider: Raquel Tenorio MD     [address] [place] [address]       Chief Complaint   Patient presents with    Foot Pain     +drug abuse. states shot up in foot and now having pain and swelling         HISTORY OF PRESENT ILLNESS   (Location/Symptom, Timing/Onset,Context/Setting, Quality, Duration, Modifying Factors, Severity) Note limiting factors. HPI    Mack Pulido is a 28 y.o. male who presents to the emergency department with right foot pain. Patient is a IV drug user and has been injecting into the foot vein. It is red and swollen and tender to touch. Patient has frequent abscesses. Patient has multiple marks throughout his body. Patient has track marks. Wants to get to the cat house. However has been doing this for about 7 to 8 years. He has multiple cellulitis episodes and infections. Just finished a course of antibiotics about couple weeks ago. He developed more cellulitis and abscess on his right foot. Nursing Notes were reviewed. REVIEW OFSYSTEMS    (2+ for level 4; 10+ for level 5)   Review of Systems    General: No fevers, chills or night sweats, No weight loss    Head:  No Sore throat,  No Ear Pain    Chest:  Nontender. No Cough, No SOB,  Chest Pain    GI: No abdominal pain or vomiting    : No dysuria or hematuria    Musculoskeletal: No unrelenting pain or night pain    Neurologic: No bowel or bladder incontinence, No saddle anesthesia, No leg weakness    All other systems reviewed and are negative.         PAST MEDICAL HISTORY     Past Medical History:   Diagnosis Date    ADHD (attention deficit hyperactivity disorder)     Cyst     testicles    Depression     Drug abuse, IV ([address]     heroine    Hemorrhoids     Hepatitis C     Heroin use        SURGICAL HISTORY       Past Surgical History:   Procedure Laterality Date    NASAL SEPTUM SURGERY      TONSILLECTOMY      TYMPANOSTOMY TUBE PLACEMENT         CURRENT MEDICATIONS       Previous Medications    No medications on file       ALLERGIES     Clindamycin, Clindamycin/lincomycin, and Azithromycin    FAMILY HISTORY       Family History   Problem Relation Age of Onset    Diabetes Mother     Depression Mother     Substance Abuse Mother     High Blood Pressure Maternal Grandmother     Mental Illness Father     Substance Abuse Brother         SOCIAL HISTORY       Social History     Socioeconomic History    Marital status: Single     Spouse name: None    Number of children: None    Years of education: None    Highest education level: None   Occupational History    Occupation: unemployed   Tobacco Use    Smoking status: Current Every Day Smoker     Packs/day: 0.50     Years: 6.00     Pack years: 3.00     Types: Cigarettes    Smokeless tobacco: Current User     Types: Chew   Vaping Use    Vaping Use: Never used   Substance and Sexual Activity    Alcohol use: No     Comment: social    Drug use: Yes     Frequency: 7.0 times per week     Types: Marijuana (Rosezella Dubin), IV, Opiates      Comment: fentynal    Sexual activity: Yes   Other Topics Concern    None   Social History Narrative    None     Social Determinants of Health     Financial Resource Strain:     Difficulty of Paying Living Expenses: Not on file   Food Insecurity:     Worried About Running Out of Food in the Last Year: Not on file    Mallorie of Food in the Last Year: Not on file   Transportation Needs:     Lack of Transportation (Medical): Not on file    Lack of Transportation (Non-Medical):  Not on file   Physical Activity:     Days of Exercise per Week: Not on file    Minutes of Exercise per Session: Not on file   Stress:     Feeling of Stress : Not on file   Social Connections:     Frequency of Communication with Friends and Family: Not on file    Frequency of Social Gatherings with Friends and Family: Not on file    Attends Je[employer] witness Services: Not on file    Active Member of Clubs or Organizations: Not on file    Attends Club or Organization Meetings: Not on file    Marital Status: Not on file   Intimate Partner Violence:     Fear of Current or Ex-Partner: Not on file    Emotionally Abused: Not on file    Physically Abused: Not on file    Sexually Abused: Not on file   Housing Stability:     Unable to Pay for Housing in the Last Year: Not on file    Number of [place] in the Last Year: Not on file    Unstable Housing in the Last Year: Not on file       SCREENINGS           PHYSICAL EXAM    (up to 7 for level 4, 8 or more for level 5)     ED Triage Vitals   BP Temp Temp src Pulse Resp SpO2 Height Weight   -- -- -- -- -- -- -- --       Physical Exam    General: Alert and awake ×3. Nontoxic appearance. Well-developed well-nourished chronically ill 19-year-old in no acute distress  HEENT: Normocephalic atraumatic. Neck is supple. Airway intact. No adenopathy  Cardiac: Regular rate and rhythm with no murmurs rubs or gallops  Pulmonary: Lungs are clear in all lung fields. No wheezing. No Rales. Abdomen: Soft and nontender. Negative hepatosplenomegaly. Bowel sounds are active  Extremities: Moving all extremities. No calf tenderness. Peripheral pulses all intact. Right foot is red swollen and dorsal aspect. He is lots of scar was noted with warm to touch. Neurovascular exam was normal.  Good artery. Cap refill is less than 2 seconds. Does have a lot of scar tissues and old track marks throughout his veins. Skin: No skin lesions. No rashes  Neurologic: Cranial nerves II through XII was grossly intact. Nonfocal neurological exam  Psychiatric: Patient is pleasant. Mood is appropriate. DIAGNOSTIC RESULTS     EKG (Per Emergency Physician):       RADIOLOGY (Per Emergency Physician):        Interpretation per the Radiologist below, if available at the time of this note:  XR FOOT RIGHT (MIN 3 VIEWS)    Result Date: 3/28/2022  EXAMINATION: 3 XRAY VIEWS OF THE RIGHT FOOT 3/28/2022 8:04 am COMPARISON: December 23, 2021 HISTORY: ORDERING SYSTEM PROVIDED HISTORY: PAIN, INFECTION TECHNOLOGIST PROVIDED HISTORY: Reason for exam:->PAIN, INFECTION Reason for Exam: PT. STATES HE SHOT UP DRUGS INTO RT. FOOT NOW C/O RADIATING PAIN WITH SWELLING , AND WOUNDS ALL OVER RT. FOOT FINDINGS: There is no evidence of acute fracture. There is normal alignment of the tarsometatarsal joints. No acute joint abnormality. No focal osseous lesion. No focal soft tissue abnormality. No acute osseous abnormality. ED BEDSIDE ULTRASOUND:   Performed by ED Physician - none    LABS:  Labs Reviewed - No data to display     All other labs were within normal range or not returned as of this dictation. Procedures      EMERGENCY DEPARTMENT COURSE and DIFFERENTIAL DIAGNOSIS/MDM:   Vitals:    Vitals:    03/28/22 0830   BP: 139/83   Pulse: 76   Resp: 17   Temp: 98.8 °F (37.1 °C)   TempSrc: Oral   SpO2: 99%       Medications   ibuprofen (ADVIL;MOTRIN) tablet 800 mg (800 mg Oral Given 3/28/22 0857)   cephALEXin (KEFLEX) capsule 1,000 mg (1,000 mg Oral Given 3/28/22 0857)       MDM. Patient is a 70-year-old IV drug user multiple track marks now has a cellulitis on the dorsal aspect of the foot. Patient been injecting heroin and fentanyl there. Last dose was about 2 days ago. He has a cellulitis x-ray reveals no osteomyelitis. Patient placed on Keflex and Bactrim. First dose given in ED. Patient placed on ibuprofen only. Since he is a drug user did not feel comfortable giving her any narcotics. Patient will be discharged follow-up with a [facility] for further treatment for detox. REVAL:         CRITICAL CARE TIME   Total CriticalCare time was 0 minutes, excluding separately reportable procedures.   There was a high probability of clinically significant/life threatening deterioration in the patient's condition which required my urgent intervention. CONSULTS:  None    PROCEDURES:  Unless otherwise noted below, none     [unfilled]    FINAL IMPRESSION      1. Cellulitis of foot, right          DISPOSITION/PLAN   DISPOSITION        PATIENT REFERRED TO:  Family physician    Schedule an appointment as soon as possible for a visit in 1 week  If symptoms worsen      DISCHARGE MEDICATIONS:  New Prescriptions    CEPHALEXIN (KEFLEX) 500 MG CAPSULE    Take 1 capsule by mouth 3 times daily for 10 days    SULFAMETHOXAZOLE-TRIMETHOPRIM (BACTRIM DS) 800-160 MG PER TABLET    Take 1 tablet by mouth 2 times daily for 7 days          (Please note:  Portions of this note were completed with a voice recognition program.Efforts were made to edit the dictations but occasionally words and phrases are mis-transcribed.)  Form v2016. J.5-cn    Susanne JIMENES MD (electronically signed)  Emergency Medicine Provider        Hector Torres MD  03/28/22 0411

## 2022-03-28 NOTE — ED NOTES
Pt discharged from ED to home. Pt verbalizes understanding to discharge instructions, teach back successful. Pt denies questions at this time. No acute distress noted. Resp even and unlabored. A/ox4. Pt instructed to follow-up as noted - return to ED if symptoms worsen. Pt verbalizes understanding. Discharged per ED MD with discharge instructions. Pt refuses ambulatory assistance to lobby and walks with steady gait.         Sophie Tarango RN  03/28/22 9205

## 2022-03-28 NOTE — ED TRIAGE NOTES
Pt presents to ED with c/o of worsening right foot pain. States \"shot up heroin\" in foot and now having increase in pain and swelling. No fever. VSS. States has happened before and needed abx. Redness and swelling noted. pt reports increase pain with palpation and ambulation. Resp even and unlabored. A/ox4. No acute distress noted. Denies any need at this time. Call light within reach. Bed in lowest position. Will continue to monitor.

## 2022-03-30 NOTE — ED NOTES
Pt called and stated he lost prescriptions. Discharge instructions and prescriptions reviewed.  Prescriptions called in to [facility] on [address] per pt request.     Александр Juarez RN  03/30/22 3448

## 2022-03-31 ENCOUNTER — HOSPITAL ENCOUNTER (EMERGENCY)
Age: 27
Discharge: LEFT BEFORE DISCHARGE | End: 2022-03-31
Attending: EMERGENCY MEDICINE
Payer: MEDICAID

## 2022-03-31 VITALS
TEMPERATURE: 98.3 F | HEIGHT: 69 IN | BODY MASS INDEX: 19.92 KG/M2 | WEIGHT: 134.48 LBS | HEART RATE: 80 BPM | SYSTOLIC BLOOD PRESSURE: 120 MMHG | RESPIRATION RATE: 14 BRPM | DIASTOLIC BLOOD PRESSURE: 70 MMHG | OXYGEN SATURATION: 97 %

## 2022-03-31 DIAGNOSIS — L03.115 CELLULITIS OF RIGHT LOWER EXTREMITY: Primary | ICD-10-CM

## 2022-03-31 DIAGNOSIS — L02.91 ABSCESS: ICD-10-CM

## 2022-03-31 PROCEDURE — 99284 EMERGENCY DEPT VISIT MOD MDM: CPT

## 2022-03-31 PROCEDURE — 6370000000 HC RX 637 (ALT 250 FOR IP): Performed by: EMERGENCY MEDICINE

## 2022-03-31 PROCEDURE — 96372 THER/PROPH/DIAG INJ SC/IM: CPT

## 2022-03-31 PROCEDURE — 6360000002 HC RX W HCPCS: Performed by: EMERGENCY MEDICINE

## 2022-03-31 RX ORDER — CEFAZOLIN SODIUM 1 G/3ML
1000 INJECTION, POWDER, FOR SOLUTION INTRAMUSCULAR; INTRAVENOUS ONCE
Status: COMPLETED | OUTPATIENT
Start: 2022-03-31 | End: 2022-03-31

## 2022-03-31 RX ORDER — PREDNISONE 20 MG/1
60 TABLET ORAL ONCE
Status: COMPLETED | OUTPATIENT
Start: 2022-03-31 | End: 2022-03-31

## 2022-03-31 RX ORDER — SULFAMETHOXAZOLE AND TRIMETHOPRIM 800; 160 MG/1; MG/1
1 TABLET ORAL 2 TIMES DAILY
Qty: 20 TABLET | Refills: 0 | Status: SHIPPED | OUTPATIENT
Start: 2022-03-31 | End: 2022-04-10 | Stop reason: ALTCHOICE

## 2022-03-31 RX ORDER — KETOROLAC TROMETHAMINE 30 MG/ML
15 INJECTION, SOLUTION INTRAMUSCULAR; INTRAVENOUS ONCE
Status: COMPLETED | OUTPATIENT
Start: 2022-03-31 | End: 2022-03-31

## 2022-03-31 RX ORDER — NAPROXEN 500 MG/1
500 TABLET ORAL 2 TIMES DAILY
Qty: 15 TABLET | Refills: 0 | Status: SHIPPED | OUTPATIENT
Start: 2022-03-31 | End: 2022-04-10 | Stop reason: ALTCHOICE

## 2022-03-31 RX ADMIN — CEFAZOLIN SODIUM 1000 MG: 1 INJECTION, POWDER, FOR SOLUTION INTRAMUSCULAR; INTRAVENOUS at 08:42

## 2022-03-31 RX ADMIN — PREDNISONE 60 MG: 20 TABLET ORAL at 06:41

## 2022-03-31 RX ADMIN — KETOROLAC TROMETHAMINE 15 MG: 30 INJECTION, SOLUTION INTRAMUSCULAR at 06:41

## 2022-03-31 ASSESSMENT — PAIN DESCRIPTION - PROGRESSION: CLINICAL_PROGRESSION: RAPIDLY WORSENING

## 2022-03-31 ASSESSMENT — PAIN DESCRIPTION - LOCATION
LOCATION: FOOT
LOCATION: FOOT

## 2022-03-31 ASSESSMENT — PAIN - FUNCTIONAL ASSESSMENT: PAIN_FUNCTIONAL_ASSESSMENT: 0-10

## 2022-03-31 ASSESSMENT — PAIN DESCRIPTION - FREQUENCY
FREQUENCY: CONTINUOUS
FREQUENCY: CONTINUOUS

## 2022-03-31 ASSESSMENT — PAIN SCALES - GENERAL
PAINLEVEL_OUTOF10: 10
PAINLEVEL_OUTOF10: 10
PAINLEVEL_OUTOF10: 0

## 2022-03-31 ASSESSMENT — PAIN DESCRIPTION - DESCRIPTORS
DESCRIPTORS: THROBBING
DESCRIPTORS: ACHING;SORE

## 2022-03-31 ASSESSMENT — PAIN DESCRIPTION - ORIENTATION
ORIENTATION: LEFT
ORIENTATION: RIGHT

## 2022-03-31 ASSESSMENT — PAIN DESCRIPTION - ONSET: ONSET: PROGRESSIVE

## 2022-03-31 ASSESSMENT — ENCOUNTER SYMPTOMS: COLOR CHANGE: 1

## 2022-03-31 ASSESSMENT — PAIN DESCRIPTION - PAIN TYPE
TYPE: CHRONIC PAIN
TYPE: ACUTE PAIN

## 2022-03-31 NOTE — ED NOTES
EMMA Aguilar and this RN recommending admission. Pt asking if he can go home and return for admission after work Friday. Educated on risks of not being admitted today. States he needs to call and speak to his boss. Calling boss @[phone].       Juan Antonio Cisneros [facility]  03/31/22 4364

## 2022-03-31 NOTE — ED PROVIDER NOTES
Continuity of Care Note:    The patient was seen and examined earlier by Dr. Martin Guzman. At the time of change of shift, test results and final disposition were pending. I was asked to follow-up on test results and make final disposition of the patient. I also completed a face-to-face examination. HPI: Dagoberto Courtney is a 28 y.o. male who presents to the emergency department with right foot pain and swelling. In summary, the patient has a history of IV drug use with a history of injecting into the foot. He is currently taking Bactrim and Keflex but has had no improvement. He denies any fever, chills, nausea vomiting or diarrhea. He denies any chest pain or shortness of breath. He denies any headache neck pain or back pain. He denies any focal weakness or numbness. He denies any trauma or injury to the area. The patient was initially seen and examined by Dr. Martin Guzman earlier this morning who recommended IV antibiotics, and admission, laboratory testing, x-rays, etc.  The patient stated that he simply wanted something for pain and additional antibiotics and had to go to work and could not be admitted. He declined any testing. He did eventually agree to contact his boss at 7 AM to see if he could undergo testing and admission. At the time of change of shift, the patient's final disposition was pending the patient's decision if he wanted to proceed with any work-up. Physical Exam:     Constitutional: No apparent distress. Nontoxic. Not ill-appearing. Head: No visible evidence of trauma. Normocephalic. Eyes: Pupils equal and reactive. No photophobia. Conjunctiva normal.    HENT: Oral mucosa moist.  Airway patent. Neck:  Soft and supple. Nontender. Heart:  Regular rate and rhythm. No murmur. Lungs:  Clear to auscultation. No wheezes, rales, or ronchi. No conversational dyspnea or accessory muscle use. Abdomen:  Soft, non-distended. Nontender. No guarding rigidity or rebound. Musculoskeletal: Extremities non-tender with full range of motion with the exception of the right foot. There is soft tissue swelling, erythema, warmth, and induration to the dorsal aspect of the right foot. There is a large open wound that has recently been draining measuring approximately 2 cm in diameter over the mid dorsal foot. No crepitance. No visible foreign body. Intertriginous spaces contain some dirt but no erythema or open wounds. Plantar surface was normal.  Capillary refill less than 2 seconds in all digits. I was able to palpate a dorsalis pedis and posterior tibial pulse. No groin thigh knee or calf pain. Hip and knee were nontender reformed motion. The patient is able to bear weight with mild discomfort in the right foot. Neurological: Alert and oriented x 3. Speech clear. No acute focal motor or sensory deficits. No facial droop. No pronator drift. Skin: Skin is warm and dry. No rash. Multiple track marks and old appearing scars and scattered healing wounds to the upper extremities, dorsal hands, and dorsal feet bilaterally. No other areas of secondary infection erythema or induration. Psychiatric: Normal mood and affect. Behavior is normal.     DIAGNOSTIC RESULTS     EKG: All EKG's are interpreted by the Emergency Department Physician who either signs or Co-signs this chart in the absence of a cardiologist.        RADIOLOGY:   Non-plain film images such as CT, Ultrasound and MRI are read by the radiologist. Plain radiographic images are visualized and preliminarily interpreted by the emergency physician with the below findings:        Interpretation per the Radiologist below, if available at the time of this note:    No orders to display         ED BEDSIDE ULTRASOUND:   Performed by ED Physician - none    LABS:  Labs Reviewed - No data to display    All other labs were within normal range or not returned as of this dictation.     EMERGENCY DEPARTMENT COURSE and DIFFERENTIAL DIAGNOSIS/MDM:   Vitals:    Vitals:    03/31/22 0634 03/31/22 0916   BP: 125/87 120/70   Pulse: 104 80   Resp: 20 14   Temp: 98.3 °F (36.8 °C) 98.3 °F (36.8 °C)   TempSrc: Oral Oral   SpO2: 97%    Weight: 134 lb 7.7 oz (61 kg)    Height: 5' 9\" (1.753 m)        Patient presents with a cellulitis to his right foot as a result of IV drug use as noted above. He does have an abscess to the foot which is spontaneously draining. I advised the patient to be admitted for IV antibiotics, orthopedic or podiatrist evaluation for potential incision and drainage and washout. He is hemodynamically stable. Initial heart rate was 104 but his heart rate currently is 89. He is nontoxic in appearance. The infection does appear to be confined to the dorsal aspect of the right foot. The remainder of the right lower extremity is uninvolved. No groin thigh knee calf, or ankle pain. He is fully awake alert and oriented x3. He is competent to make this decision. He demonstrates appropriate decision-making capacity. He states that he is waiting on his employer to call back. 8:22 AM: The patient talked to his boss. The patient states that he has to go to work and cannot miss work or he will lose his job. He declined laboratory studies and testing, x-rays, but is willing to take additional antibiotics. I will add Augmentin. He is allergic to clindamycin. Advised him to continue Bactrim. He will also be given a prescription for naproxen. He states that he may return this evening after he gets off work. I advised him that if he decides that he does want to be admitted he should go directly to [facility] emergency department for evaluation and admission knowing that that is where he would be admitted. He was advised of the risk of leaving [address] including but not limited the possibility of delaying diagnosis, worsening of his condition, permanent disability, loss of limb, abscess, and/or death.   He verbalized understanding of the risk. He will also be given a sample of chlorhexidine and was advised to clean the area twice daily with warm soapy water. He will be given a referral to orthopedics. Advised him to follow-up in 1 to 2 days for reexamination. If his condition worsens or new symptoms develop, he was advised to return immediately to the emergency department. I also advised him to follow-up with a primary care physician in 1 to 2 days. MDM      CRITICAL CARE TIME   Total Critical Care time was 0 minutes, excluding separately reportable procedures. There was a high probability of clinically significant/life threatening deterioration in the patient's condition which required my urgent intervention. CONSULTS:  None    PROCEDURES:  Unless otherwise noted below, none     Procedures        FINAL IMPRESSION      1. Cellulitis of right lower extremity    2. Abscess          DISPOSITION/PLAN   DISPOSITION [place] 03/31/2022 08:27:57 AM      PATIENT REFERRED TO:  Chey Bland MD  [address] [place] [address] Drive  [address] [place] [address]  [phone]    Call today      [phone]  Referral  Call [phone] for an appointment  Call today        DISCHARGE MEDICATIONS:  Discharge Medication List as of 3/31/2022  8:31 AM      START taking these medications    Details   !! sulfamethoxazole-trimethoprim (BACTRIM DS) 800-160 MG per tablet Take 1 tablet by mouth 2 times daily for 10 days, Disp-20 tablet, R-0Normal      naproxen (NAPROSYN) 500 MG tablet Take 1 tablet by mouth 2 times daily, Disp-15 tablet, R-0Normal       !! - Potential duplicate medications found. Please discuss with provider. Controlled Substances Monitoring:     No flowsheet data found. (Please note that portions of this note were completed with a voice recognition program.  Efforts were made to edit the dictations but occasionally words are mis-transcribed. )    [address],  (electronically signed)  Attending Emergency Physician       Feliciano Cobb,   03/31/22 3165

## 2022-03-31 NOTE — Clinical Note
Mabelene Litten was seen and treated in our emergency department on 3/31/2022. He may return to work on 04/01/2022. If you have any questions or concerns, please don't hesitate to call.       Kory Hoyt, DO

## 2022-03-31 NOTE — LETTER
[facility] [address]  Phone: [phone]               March 31, 2022    Patient: Luba Harris   YOB: 1995   Date of Visit: 3/31/2022       To Whom It May Concern:    Morgan Myers was seen and treated in our emergency department on 3/31/2022. He may return to work on March 31st.  [facility].       Sincerely,       Bridget Mahoney RN         Signature:__________________________________

## 2022-03-31 NOTE — ED NOTES
Dc'd to home  After ancef was given in left gluteal area as requested  He is currently calling [facility] daily to try and get admitted  Denies need for any other information for rehab  Wounds cleaned and dressed  Aware to change dressing daily  He is aware we want to admit him and at this time he is refusing this  I did give him verbal and written instructions  He will not stay for admission      Jey Grullon RN  03/31/22 2794

## 2022-03-31 NOTE — ED PROVIDER NOTES
[address] [facility]      Pt Name: Celso Pennington  MRN: [id number]  [place] 1995  Date ofevaluation: 3/31/2022  Provider: Fredy Licea MD    CHIEF COMPLAINT     No chief complaint on file. HISTORY OF PRESENT ILLNESS   (Location/Symptom, Timing/Onset,Context/Setting, Quality, Duration, Modifying Factors, Severity)  Note limiting factors. Celso Pennington is a 28 y.o. male  who  has a past medical history of ADHD (attention deficit hyperactivity disorder), Cyst, Depression, Drug abuse, IV ([address] Hemorrhoids, Hepatitis C, and Heroin use. who presents to the emergency department evaluation of swelling of the right foot. Patient reports a history of IV drug use and injects into his foot. Patient reports persistent swelling of the right foot despite recently starting oral antibiotics. Patient was seen on the third of this month and started on oral antibiotics. He was seen 2 days previously and given additional dose of Bactrim and Keflex. He reports persistent swelling and worsening pain. He reports extension of the erythema. Denies fever but states he does feel hot. He has now developed a bulging sore to the dorsum of the foot. HPI    NursingNotes were reviewed. REVIEW OF SYSTEMS    (2-9 systems for level 4, 10 or more for level 5)     Review of Systems   Musculoskeletal: Positive for arthralgias, gait problem and joint swelling. Skin: Positive for color change and wound. Neurological: Negative for weakness and numbness. Except as noted above the remainder of the review of systems was reviewed and negative.        PAST MEDICAL HISTORY     Past Medical History:   Diagnosis Date    ADHD (attention deficit hyperactivity disorder)     Cyst     testicles    Depression     Drug abuse, IV ([address]     heroine    Hemorrhoids     Hepatitis C     Heroin use          SURGICALHISTORY       Past Surgical History:   Procedure Laterality Date  NASAL SEPTUM SURGERY      TONSILLECTOMY      TYMPANOSTOMY TUBE PLACEMENT           CURRENT MEDICATIONS       Previous Medications    CEPHALEXIN (KEFLEX) 500 MG CAPSULE    Take 1 capsule by mouth 3 times daily for 10 days    SULFAMETHOXAZOLE-TRIMETHOPRIM (BACTRIM DS) 800-160 MG PER TABLET    Take 1 tablet by mouth 2 times daily for 7 days            Clindamycin, Clindamycin/lincomycin, and Azithromycin    FAMILY HISTORY       Family History   Problem Relation Age of Onset    Diabetes Mother     Depression Mother     Substance Abuse Mother     High Blood Pressure Maternal Grandmother     Mental Illness Father     Substance Abuse Brother           SOCIAL HISTORY       Social History     Socioeconomic History    Marital status: Single     Spouse name: Not on file    Number of children: Not on file    Years of education: Not on file    Highest education level: Not on file   Occupational History    Occupation: unemployed   Tobacco Use    Smoking status: Current Every Day Smoker     Packs/day: 0.50     Years: 6.00     Pack years: 3.00     Types: Cigarettes    Smokeless tobacco: Current User     Types: Chew   Vaping Use    Vaping Use: Never used   Substance and Sexual Activity    Alcohol use: No     Comment: social    Drug use: Yes     Frequency: 7.0 times per week     Types: Marijuana (Weed), IV, Opiates      Comment: fentynal    Sexual activity: Yes   Other Topics Concern    Not on file   Social History Narrative    Not on file     Social Determinants of Health     Financial Resource Strain:     Difficulty of Paying Living Expenses: Not on file   Food Insecurity:     Worried About Running Out of Food in the Last Year: Not on file    Mallorie of Food in the Last Year: Not on file   Transportation Needs:     Lack of Transportation (Medical): Not on file    Lack of Transportation (Non-Medical):  Not on file   Physical Activity:     Days of Exercise per Week: Not on file    Minutes of Exercise per Session: Not on file   Stress:     Feeling of Stress : Not on file   Social Connections:     Frequency of Communication with Friends and Family: Not on file    Frequency of Social Gatherings with Friends and Family: Not on file    Attends Jainism Services: Not on file    Active Member of [address] [place] [address] or Organizations: Not on file    Attends Club or Organization Meetings: Not on file    Marital Status: Not on file   Intimate Partner Violence:     Fear of Current or Ex-Partner: Not on file    Emotionally Abused: Not on file    Physically Abused: Not on file    Sexually Abused: Not on file   Housing Stability:     Unable to Pay for Housing in the Last Year: Not on file    Number of [place] in the Last Year: Not on file    Unstable Housing in the Last Year: Not on file       SCREENINGS             PHYSICAL EXAM    (up to 7 for level 4, 8 or more for level 5)     ED Triage Vitals   BP Temp Temp src Pulse Resp SpO2 Height Weight   -- -- -- -- -- -- -- --       Physical Exam  Vitals and nursing note reviewed. Constitutional:       General: He is not in acute distress. Appearance: He is well-developed. HENT:      Head: Normocephalic and atraumatic. Eyes:      Extraocular Movements: Extraocular movements intact. Conjunctiva/sclera: Conjunctivae normal.      Pupils: Pupils are equal, round, and reactive to light. Neck:      Trachea: No tracheal deviation. Cardiovascular:      Rate and Rhythm: Regular rhythm. Tachycardia present. Pulses: Normal pulses. Pulmonary:      Effort: Pulmonary effort is normal.      Breath sounds: Normal breath sounds. No wheezing or rales. Abdominal:      General: There is no distension. Palpations: Abdomen is soft. Tenderness: There is no abdominal tenderness. Musculoskeletal:         General: Swelling and tenderness present. No deformity. Normal range of motion. Cervical back: Normal range of motion.    Skin:     General: Skin is warm and dry. Capillary Refill: Capillary refill takes less than 2 seconds. Findings: Erythema present. Neurological:      General: No focal deficit present. Mental Status: He is alert and oriented to person, place, and time. RESULTS     EKG: All EKG's are interpreted by the Emergency Department Physician who either signs or Co-signsthis chart in the absence of a cardiologist.        RADIOLOGY:   Aric Service such as CT, Ultrasound and MRI are read by the radiologist. Plain radiographic images are visualized and preliminarily interpreted by the emergency physician with the below findings:      Interpretation per the Radiologist below, if available at the time ofthis note:    No orders to display         ED BEDSIDE ULTRASOUND:   Performed by ED Physician - none    LABS:  Labs Reviewed - No data to display    All other labs were within normal range or not returned as of this dictation. EMERGENCY DEPARTMENT COURSE and DIFFERENTIAL DIAGNOSIS/MDM:   Vitals: There were no vitals filed for this visit. Patient was given thefollowing medications:  Medications   predniSONE (DELTASONE) tablet 60 mg (has no administration in time range)   ketorolac (TORADOL) injection 15 mg (has no administration in time range)       ED COURSE & MEDICAL DECISION MAKING    Pertinent Labs & Imaging studies reviewed. (See chart for details)   -  Patient seen and evaluated in the emergency department. -  Triage and nursing notes reviewed and incorporated. -  Old chart records reviewed and incorporated. -  Differential diagnosis includes: Differential diagnosis: Necrotizing fasciitis, cellulitis, erysipelas, suppurative thrombophlebitis, aseptic superficial thrombophlebitis, DVT, gout, compartment syndrome, erythema migrans (lyme disease), contact dermatitis, lymphedema, other    -  Work-up included:  See above  -  ED treatment included: See above  -  Results discussed with patient.   Patient presents ED for evaluation of swelling and erythema to the right foot. This is the patient's third ED visit for infection secondary to IV drug use in the foot. Reviewing previous medical record patient's foot appears significantly swollen and erythematous compared to previous. Patient informed that he will require admission and likely advanced imaging for concern of osteomyelitis or abscess. Patient informed that he is not currently able to be admitted because of his employment. Informed patient that this may result in loss of limb or disability and even death. Patient states that he has been away until 7:00 and call his boss. We will hold off on work-up until patient can decide on whether or not he can be admitted to the hospital.     Patient will be signed out to oncoming provider pending patient's decision whether not he needs to be admitted. I would suggest start the patient on prednisone to augment treatment with cellulitis and consider using Cipro based on location of patient's cellulitis. REASSESSMENT          CRITICAL CARE TIME   Total Critical Care time was 0 minutes, excluding separately reportable procedures. There was a high probability of clinically significant/life threatening deterioration in the patient's condition which required my urgent intervention. CONSULTS:  None    PROCEDURES:  Unless otherwise noted below, none     Procedures    FINAL IMPRESSION      1. Cellulitis of right lower extremity    2. Abscess          DISPOSITION/PLAN   DISPOSITION        PATIENT REFERREDTO:  No follow-up provider specified.     DISCHARGEMEDICATIONS:  New Prescriptions    No medications on file          (Please note that portions of this note were completed with a voice recognition program.  Efforts were made to edit the dictations but occasionally words are mis-transcribed.)    Kehinde Owens MD (electronically signed)  Attending Emergency Physician          Kehinde Owens MD  03/31/22 2177

## 2022-04-10 ENCOUNTER — APPOINTMENT (OUTPATIENT)
Dept: GENERAL RADIOLOGY | Age: 27
DRG: 383 | End: 2022-04-10
Payer: MEDICAID

## 2022-04-10 ENCOUNTER — HOSPITAL ENCOUNTER (INPATIENT)
Age: 27
LOS: 1 days | Discharge: LEFT BEFORE DISCHARGE | DRG: 383 | End: 2022-04-11
Attending: EMERGENCY MEDICINE | Admitting: STUDENT IN AN ORGANIZED HEALTH CARE EDUCATION/TRAINING PROGRAM
Payer: MEDICAID

## 2022-04-10 DIAGNOSIS — F11.10 HEROIN ABUSE (HCC): ICD-10-CM

## 2022-04-10 DIAGNOSIS — L03.818 CELLULITIS OF OTHER SPECIFIED SITE: ICD-10-CM

## 2022-04-10 DIAGNOSIS — L02.419 CELLULITIS AND ABSCESS OF LEG: Primary | ICD-10-CM

## 2022-04-10 DIAGNOSIS — L03.119 CELLULITIS AND ABSCESS OF LEG: Primary | ICD-10-CM

## 2022-04-10 LAB
ALBUMIN SERPL-MCNC: 4.4 G/DL (ref 3.4–5)
ALP BLD-CCNC: 91 U/L (ref 40–129)
ALT SERPL-CCNC: 20 U/L (ref 10–40)
ANION GAP SERPL CALCULATED.3IONS-SCNC: 13 MMOL/L (ref 3–16)
AST SERPL-CCNC: 28 U/L (ref 15–37)
BASOPHILS ABSOLUTE: 0 K/UL (ref 0–0.2)
BASOPHILS RELATIVE PERCENT: 0.3 %
BILIRUB SERPL-MCNC: <0.2 MG/DL (ref 0–1)
BILIRUBIN DIRECT: <0.2 MG/DL (ref 0–0.3)
BILIRUBIN, INDIRECT: NORMAL MG/DL (ref 0–1)
BUN BLDV-MCNC: 15 MG/DL (ref 7–20)
CALCIUM SERPL-MCNC: 9.7 MG/DL (ref 8.3–10.6)
CHLORIDE BLD-SCNC: 104 MMOL/L (ref 99–110)
CO2: 25 MMOL/L (ref 21–32)
CREAT SERPL-MCNC: 0.8 MG/DL (ref 0.9–1.3)
EOSINOPHILS ABSOLUTE: 0 K/UL (ref 0–0.6)
EOSINOPHILS RELATIVE PERCENT: 0.3 %
GFR AFRICAN AMERICAN: >60
GFR NON-AFRICAN AMERICAN: >60
GLUCOSE BLD-MCNC: 111 MG/DL (ref 70–99)
HCT VFR BLD CALC: 37.5 % (ref 40.5–52.5)
HEMOGLOBIN: 12.6 G/DL (ref 13.5–17.5)
LACTIC ACID: 1.5 MMOL/L (ref 0.4–2)
LYMPHOCYTES ABSOLUTE: 1.4 K/UL (ref 1–5.1)
LYMPHOCYTES RELATIVE PERCENT: 14.4 %
MCH RBC QN AUTO: 26.9 PG (ref 26–34)
MCHC RBC AUTO-ENTMCNC: 33.7 G/DL (ref 31–36)
MCV RBC AUTO: 79.8 FL (ref 80–100)
MONOCYTES ABSOLUTE: 0.6 K/UL (ref 0–1.3)
MONOCYTES RELATIVE PERCENT: 6.6 %
NEUTROPHILS ABSOLUTE: 7.5 K/UL (ref 1.7–7.7)
NEUTROPHILS RELATIVE PERCENT: 78.4 %
PDW BLD-RTO: 14 % (ref 12.4–15.4)
PLATELET # BLD: 253 K/UL (ref 135–450)
PMV BLD AUTO: 7.1 FL (ref 5–10.5)
POTASSIUM REFLEX MAGNESIUM: 4.1 MMOL/L (ref 3.5–5.1)
RBC # BLD: 4.7 M/UL (ref 4.2–5.9)
SARS-COV-2, NAAT: NOT DETECTED
SODIUM BLD-SCNC: 142 MMOL/L (ref 136–145)
TOTAL PROTEIN: 7.4 G/DL (ref 6.4–8.2)
WBC # BLD: 9.5 K/UL (ref 4–11)

## 2022-04-10 PROCEDURE — 71045 X-RAY EXAM CHEST 1 VIEW: CPT

## 2022-04-10 PROCEDURE — 87040 BLOOD CULTURE FOR BACTERIA: CPT

## 2022-04-10 PROCEDURE — 96375 TX/PRO/DX INJ NEW DRUG ADDON: CPT

## 2022-04-10 PROCEDURE — 6360000002 HC RX W HCPCS: Performed by: EMERGENCY MEDICINE

## 2022-04-10 PROCEDURE — 80076 HEPATIC FUNCTION PANEL: CPT

## 2022-04-10 PROCEDURE — 85025 COMPLETE CBC W/AUTO DIFF WBC: CPT

## 2022-04-10 PROCEDURE — 86140 C-REACTIVE PROTEIN: CPT

## 2022-04-10 PROCEDURE — 87635 SARS-COV-2 COVID-19 AMP PRB: CPT

## 2022-04-10 PROCEDURE — 83605 ASSAY OF LACTIC ACID: CPT

## 2022-04-10 PROCEDURE — 2580000003 HC RX 258: Performed by: EMERGENCY MEDICINE

## 2022-04-10 PROCEDURE — 96367 TX/PROPH/DG ADDL SEQ IV INF: CPT

## 2022-04-10 PROCEDURE — 80048 BASIC METABOLIC PNL TOTAL CA: CPT

## 2022-04-10 PROCEDURE — 99284 EMERGENCY DEPT VISIT MOD MDM: CPT

## 2022-04-10 PROCEDURE — 36415 COLL VENOUS BLD VENIPUNCTURE: CPT

## 2022-04-10 PROCEDURE — 96365 THER/PROPH/DIAG IV INF INIT: CPT

## 2022-04-10 RX ORDER — SODIUM CHLORIDE, SODIUM LACTATE, POTASSIUM CHLORIDE, AND CALCIUM CHLORIDE .6; .31; .03; .02 G/100ML; G/100ML; G/100ML; G/100ML
1000 INJECTION, SOLUTION INTRAVENOUS ONCE
Status: COMPLETED | OUTPATIENT
Start: 2022-04-10 | End: 2022-04-11

## 2022-04-10 RX ORDER — BUPRENORPHINE 2 MG/1
8 TABLET SUBLINGUAL DAILY
Status: DISCONTINUED | OUTPATIENT
Start: 2022-04-11 | End: 2022-04-11 | Stop reason: HOSPADM

## 2022-04-10 RX ADMIN — VANCOMYCIN HYDROCHLORIDE 1000 MG: 1 INJECTION, POWDER, LYOPHILIZED, FOR SOLUTION INTRAVENOUS at 23:50

## 2022-04-10 RX ADMIN — CEFEPIME HYDROCHLORIDE 2000 MG: 2 INJECTION, POWDER, FOR SOLUTION INTRAVENOUS at 23:13

## 2022-04-10 RX ADMIN — SODIUM CHLORIDE, POTASSIUM CHLORIDE, SODIUM LACTATE AND CALCIUM CHLORIDE 1000 ML: 600; 310; 30; 20 INJECTION, SOLUTION INTRAVENOUS at 23:11

## 2022-04-10 ASSESSMENT — PAIN DESCRIPTION - PAIN TYPE: TYPE: ACUTE PAIN

## 2022-04-10 ASSESSMENT — PAIN SCALES - GENERAL: PAINLEVEL_OUTOF10: 6

## 2022-04-10 ASSESSMENT — PAIN DESCRIPTION - ORIENTATION: ORIENTATION: RIGHT;LEFT

## 2022-04-10 ASSESSMENT — PAIN DESCRIPTION - LOCATION: LOCATION: HAND;FOOT

## 2022-04-11 VITALS
TEMPERATURE: 97.4 F | SYSTOLIC BLOOD PRESSURE: 112 MMHG | HEART RATE: 49 BPM | OXYGEN SATURATION: 98 % | BODY MASS INDEX: 18.7 KG/M2 | HEIGHT: 71 IN | DIASTOLIC BLOOD PRESSURE: 61 MMHG | RESPIRATION RATE: 16 BRPM | WEIGHT: 133.6 LBS

## 2022-04-11 PROBLEM — L03.90 CELLULITIS: Status: ACTIVE | Noted: 2022-04-11

## 2022-04-11 LAB — C-REACTIVE PROTEIN: 5.5 MG/L (ref 0–5.1)

## 2022-04-11 PROCEDURE — 2580000003 HC RX 258: Performed by: EMERGENCY MEDICINE

## 2022-04-11 PROCEDURE — 96366 THER/PROPH/DIAG IV INF ADDON: CPT

## 2022-04-11 PROCEDURE — G0378 HOSPITAL OBSERVATION PER HR: HCPCS

## 2022-04-11 PROCEDURE — 1200000000 HC SEMI PRIVATE

## 2022-04-11 PROCEDURE — 6360000002 HC RX W HCPCS: Performed by: STUDENT IN AN ORGANIZED HEALTH CARE EDUCATION/TRAINING PROGRAM

## 2022-04-11 PROCEDURE — 6370000000 HC RX 637 (ALT 250 FOR IP): Performed by: EMERGENCY MEDICINE

## 2022-04-11 PROCEDURE — 6360000002 HC RX W HCPCS: Performed by: EMERGENCY MEDICINE

## 2022-04-11 PROCEDURE — 2580000003 HC RX 258: Performed by: STUDENT IN AN ORGANIZED HEALTH CARE EDUCATION/TRAINING PROGRAM

## 2022-04-11 RX ORDER — GABAPENTIN 300 MG/1
300 CAPSULE ORAL EVERY 8 HOURS PRN
Status: DISCONTINUED | OUTPATIENT
Start: 2022-04-11 | End: 2022-04-11 | Stop reason: HOSPADM

## 2022-04-11 RX ORDER — HYDROXYZINE PAMOATE 25 MG/1
50 CAPSULE ORAL EVERY 8 HOURS PRN
Status: DISCONTINUED | OUTPATIENT
Start: 2022-04-11 | End: 2022-04-11 | Stop reason: HOSPADM

## 2022-04-11 RX ORDER — CLONIDINE HYDROCHLORIDE 0.1 MG/1
0.1 TABLET ORAL PRN
Status: DISCONTINUED | OUTPATIENT
Start: 2022-04-11 | End: 2022-04-11 | Stop reason: HOSPADM

## 2022-04-11 RX ORDER — POLYETHYLENE GLYCOL 3350 17 G/17G
17 POWDER, FOR SOLUTION ORAL DAILY PRN
Status: DISCONTINUED | OUTPATIENT
Start: 2022-04-11 | End: 2022-04-11 | Stop reason: HOSPADM

## 2022-04-11 RX ORDER — KETOROLAC TROMETHAMINE 15 MG/ML
15 INJECTION, SOLUTION INTRAMUSCULAR; INTRAVENOUS EVERY 6 HOURS PRN
Status: DISCONTINUED | OUTPATIENT
Start: 2022-04-11 | End: 2022-04-11 | Stop reason: HOSPADM

## 2022-04-11 RX ORDER — FAMOTIDINE 20 MG/1
20 TABLET, FILM COATED ORAL ONCE
Status: COMPLETED | OUTPATIENT
Start: 2022-04-11 | End: 2022-04-11

## 2022-04-11 RX ORDER — DIPHENHYDRAMINE HYDROCHLORIDE 50 MG/ML
25 INJECTION INTRAMUSCULAR; INTRAVENOUS ONCE
Status: COMPLETED | OUTPATIENT
Start: 2022-04-11 | End: 2022-04-11

## 2022-04-11 RX ORDER — SODIUM CHLORIDE 0.9 % (FLUSH) 0.9 %
5-40 SYRINGE (ML) INJECTION EVERY 12 HOURS SCHEDULED
Status: DISCONTINUED | OUTPATIENT
Start: 2022-04-11 | End: 2022-04-11 | Stop reason: HOSPADM

## 2022-04-11 RX ORDER — SODIUM CHLORIDE 0.9 % (FLUSH) 0.9 %
5-40 SYRINGE (ML) INJECTION PRN
Status: DISCONTINUED | OUTPATIENT
Start: 2022-04-11 | End: 2022-04-11 | Stop reason: HOSPADM

## 2022-04-11 RX ORDER — TRAMADOL HYDROCHLORIDE 50 MG/1
50 TABLET ORAL PRN
Status: DISCONTINUED | OUTPATIENT
Start: 2022-04-11 | End: 2022-04-11 | Stop reason: HOSPADM

## 2022-04-11 RX ORDER — ONDANSETRON 2 MG/ML
4 INJECTION INTRAMUSCULAR; INTRAVENOUS EVERY 6 HOURS PRN
Status: DISCONTINUED | OUTPATIENT
Start: 2022-04-11 | End: 2022-04-11 | Stop reason: HOSPADM

## 2022-04-11 RX ORDER — AMOXICILLIN AND CLAVULANATE POTASSIUM 875; 125 MG/1; MG/1
1 TABLET, FILM COATED ORAL 2 TIMES DAILY
Qty: 20 TABLET | Refills: 0 | Status: SHIPPED | OUTPATIENT
Start: 2022-04-11 | End: 2022-04-21

## 2022-04-11 RX ORDER — ACETAMINOPHEN 650 MG/1
650 SUPPOSITORY RECTAL EVERY 6 HOURS PRN
Status: DISCONTINUED | OUTPATIENT
Start: 2022-04-11 | End: 2022-04-11 | Stop reason: HOSPADM

## 2022-04-11 RX ORDER — ACETAMINOPHEN 325 MG/1
650 TABLET ORAL EVERY 6 HOURS PRN
Status: DISCONTINUED | OUTPATIENT
Start: 2022-04-11 | End: 2022-04-11 | Stop reason: HOSPADM

## 2022-04-11 RX ORDER — SODIUM CHLORIDE 9 MG/ML
INJECTION, SOLUTION INTRAVENOUS PRN
Status: DISCONTINUED | OUTPATIENT
Start: 2022-04-11 | End: 2022-04-11 | Stop reason: HOSPADM

## 2022-04-11 RX ORDER — DOXYCYCLINE HYCLATE 100 MG
100 TABLET ORAL 2 TIMES DAILY
Qty: 20 TABLET | Refills: 0 | Status: SHIPPED | OUTPATIENT
Start: 2022-04-11 | End: 2022-04-21

## 2022-04-11 RX ORDER — TRAZODONE HYDROCHLORIDE 50 MG/1
50 TABLET ORAL NIGHTLY PRN
Status: DISCONTINUED | OUTPATIENT
Start: 2022-04-11 | End: 2022-04-11 | Stop reason: HOSPADM

## 2022-04-11 RX ADMIN — FAMOTIDINE 20 MG: 20 TABLET ORAL at 00:05

## 2022-04-11 RX ADMIN — SODIUM CHLORIDE, PRESERVATIVE FREE 10 ML: 5 INJECTION INTRAVENOUS at 09:04

## 2022-04-11 RX ADMIN — DIPHENHYDRAMINE HYDROCHLORIDE 25 MG: 50 INJECTION, SOLUTION INTRAMUSCULAR; INTRAVENOUS at 00:05

## 2022-04-11 RX ADMIN — CEFEPIME HYDROCHLORIDE 2000 MG: 2 INJECTION, POWDER, FOR SOLUTION INTRAVENOUS at 10:35

## 2022-04-11 RX ADMIN — VANCOMYCIN HYDROCHLORIDE 750 MG: 750 INJECTION, POWDER, LYOPHILIZED, FOR SOLUTION INTRAVENOUS at 09:06

## 2022-04-11 ASSESSMENT — PAIN - FUNCTIONAL ASSESSMENT: PAIN_FUNCTIONAL_ASSESSMENT: ACTIVITIES ARE NOT PREVENTED

## 2022-04-11 ASSESSMENT — PAIN SCALES - GENERAL
PAINLEVEL_OUTOF10: 4
PAINLEVEL_OUTOF10: 3
PAINLEVEL_OUTOF10: 0

## 2022-04-11 ASSESSMENT — PAIN DESCRIPTION - ONSET: ONSET: ON-GOING

## 2022-04-11 ASSESSMENT — PAIN DESCRIPTION - FREQUENCY: FREQUENCY: CONTINUOUS

## 2022-04-11 ASSESSMENT — PAIN DESCRIPTION - DESCRIPTORS: DESCRIPTORS: BURNING

## 2022-04-11 ASSESSMENT — PAIN DESCRIPTION - LOCATION: LOCATION: HAND;FOOT

## 2022-04-11 ASSESSMENT — PAIN DESCRIPTION - PAIN TYPE: TYPE: ACUTE PAIN

## 2022-04-11 ASSESSMENT — PAIN DESCRIPTION - PROGRESSION
CLINICAL_PROGRESSION: NOT CHANGED

## 2022-04-11 ASSESSMENT — PAIN DESCRIPTION - ORIENTATION: ORIENTATION: RIGHT;LEFT

## 2022-04-11 NOTE — ED NOTES
Talked with pt extensively about seeking help for his addiction problem. Doesn't seem very receptive. [facility],  addiction services, and [facility].         Luis Queen RN  04/10/22 6706

## 2022-04-11 NOTE — ED NOTES
Pt resting in bed with no distress. IV fluids and antibiotics infusing. Pt given water with MD permission. No other wants or needs.       Leonard Wing, [place]  04/10/22 1174

## 2022-04-11 NOTE — ED PROVIDER NOTES
eMERGENCY dEPARTMENT eNCOUnter      [facility]    Chief Complaint   Patient presents with    Abscess     multiple abscess to bilat hands and R foot. Pt states he was here last week for same, but declined admission because he had to go back to work. Pt states abscesses caused by IV drug use. HPI    Mack Sparks is a 28 y.o. 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 He has been seen approximately 3 weeks prior several times for cellulitis of the bilateral feet and hands with ulceration due to skin popping and and intravenous drug abuse. Daksha Caldwell His Allergies to Clindamycin Has Been Taking Bactrim and flex, and presents the ER for evaluation of persistent cellulitis of multiple injection sites, with no drainable abscess.   Afebrile, no objective fevers, no severe shortness of breath    PAST MEDICAL HISTORY    Past Medical History:   Diagnosis Date    ADHD (attention deficit hyperactivity disorder)     Cyst     testicles    Depression     Drug abuse, IV ([address]     heroine    Hemorrhoids     Hepatitis C     Heroin use        SURGICAL HISTORY    Past Surgical History:   Procedure Laterality Date    NASAL SEPTUM SURGERY      TONSILLECTOMY      TYMPANOSTOMY TUBE PLACEMENT         CURRENT MEDICATIONS        ALLERGIES    Allergies   Allergen Reactions    Clindamycin     Clindamycin/Lincomycin Itching    Azithromycin Itching       FAMILY HISTORY    Family History   Problem Relation Age of Onset    Diabetes Mother     Depression Mother     Substance Abuse Mother     High Blood Pressure Maternal Grandmother     Mental Illness Father     Substance Abuse Brother        SOCIAL HISTORY    Social History     Socioeconomic History    Marital status: Single     Spouse name: None    Number of children: None    Years of education: None    Highest education level: None   Occupational History  Occupation: unemployed   Tobacco Use    Smoking status: Current Every Day Smoker     Packs/day: 0.50     Years: 6.00     Pack years: 3.00     Types: Cigarettes    Smokeless tobacco: Current User     Types: Chew   Vaping Use    Vaping Use: Never used   Substance and Sexual Activity    Alcohol use: Not Currently     Comment: social    Drug use: Yes     Frequency: 7.0 times per week     Types: Marijuana (Lyndal Locus), IV, Opiates      Comment: 4/10/22 fentanyl and marijuana (last time today for both)    Sexual activity: Yes   Other Topics Concern    None   Social History Narrative    None     Social Determinants of Health     Financial Resource Strain:     Difficulty of Paying Living Expenses: Not on file   Food Insecurity:     Worried About Running Out of Food in the Last Year: Not on file    Mallorie of Food in the Last Year: Not on file   Transportation Needs:     Lack of Transportation (Medical): Not on file    Lack of Transportation (Non-Medical):  Not on file   Physical Activity:     Days of Exercise per Week: Not on file    Minutes of Exercise per Session: Not on file   Stress:     Feeling of Stress : Not on file   Social Connections:     Frequency of Communication with Friends and Family: Not on file    Frequency of Social Gatherings with Friends and Family: Not on file    Attends [employer] Services: Not on file    Active Member of [address] [place] [address] or Organizations: Not on file    Attends Club or Organization Meetings: Not on file    Marital Status: Not on file   Intimate Partner Violence:     Fear of Current or Ex-Partner: Not on file    Emotionally Abused: Not on file    Physically Abused: Not on file    Sexually Abused: Not on file   Housing Stability:     Unable to Pay for Housing in the Last Year: Not on file    Number of [place] in the Last Year: Not on file    Unstable Housing in the Last Year: Not on file       REVIEW OF SYSTEMS    Constitutional:  Denies fever, chills, weight loss or weakness   Eyes:  Denies photophobia or discharge   HENT:  Denies sore throat or ear pain   Respiratory:  Denies cough or shortness of breath   Cardiovascular:  Denies chest pain, palpitations or swelling   GI:  Denies abdominal pain, nausea, vomiting, or diarrhea   Musculoskeletal:  Denies back pain   Skin: +rash   Neurologic:  Denies headache, focal weakness or sensory changes   Endocrine:  Denies polyuria or polydypsia   Lymphatic:  Denies swollen glands   Psychiatric:  Denies depression, suicidal ideation or homicidal ideation   All systems negative except as marked. PHYSICAL EXAM    VITAL SIGNS: /61   Pulse 61   Temp 98.2 °F (36.8 °C) (Oral)   Resp 11   Ht 5' 10.5\" (1.791 m)   Wt 134 lb 4.8 oz (60.9 kg)   SpO2 98%   BMI 19.00 kg/m²    Constitutional: Frail, cachectic male, chronically ill-appearing  HENT:  Normocephalic, Atraumatic, Bilateral external ears normal, Oropharynx moist, No oral exudates, Nose normal. Neck- Normal range of motion, No tenderness, Supple, No stridor. Eyes:  PERRL, EOMI, Conjunctiva normal, No discharge. Respiratory:  Normal breath sounds, No respiratory distress, No wheezing, No chest tenderness. Cardiovascular:  Normal heart rate, Normal rhythm, No murmurs, No rubs, No gallops. GI:  Bowel sounds normal, Soft, No tenderness, No masses, No pulsatile masses. Musculoskeletal:  Intact distal pulses, No edema, No tenderness, No cyanosis, No clubbing. Good range of motion in all major joints. No tenderness to palpation or major deformities noted. Back- No tenderness. Integument: Extensive injection sites, bilateral hand bilateral feet ulcerations  Lymphatic:  No lymphadenopathy noted. Neurologic:  Alert & oriented x 3, Normal motor function, Normal sensory function, No focal deficits noted.    Psychiatric:  Affect normal, Judgment normal, Mood normal.       RADIOLOGY    Chest x-ray: No pneumothorax no evidence of pneumonia    PROCEDURES    none    ED COURSE & MEDICAL DECISION MAKING    Pertinent Labs & Imaging studies reviewed. (See chart for details)  Patient presents to the ER for evaluation of extensive cellulitis, no drainable abscess, no active murmur, no lactic acidosis no severe white count elevation, no clinical signs of septic emboli. Wound management and podiatry consult possibly. Multiple areas of injection sites, social service consult. Detox resources. FINAL IMPRESSION    1. Cellulitis and abscess of leg    2. Cellulitis of other specified site    3.  Heroin abuse [facility])            Sommer Sotelo MD  54/36/21 0510       Sommer Sotelo MD  21/52/65 0140

## 2022-04-11 NOTE — ED NOTES
Here for painful abscesses to hands and feet. Scattered open wounds to hands and feet. Pt states has had these abscesses for 1 month. Extensive track markings up bilat arms and to feet. States was here 1 week ago and admission was advised, but pt states he couldn't stay . Pt now agreeable to be admitted. Pain to wounds at 6. Last use of fentanyl and marijuana was today. Pt states doesn't use methamphetamine, all the wounds are from places he injected fentanyl or skin popped when he missed a vein. Lots of teaching with pt to stop drug use and seek help.         Mirian Galloway, RN  04/10/22 [id number] Mark Hutchison RN  04/10/22 2663

## 2022-04-11 NOTE — H&P
Hospital Medicine History & Physical      PCP: No primary care provider on file. Date of Admission: 4/10/2022    Date of Service: Pt seen/examined on  4/11/22 and Admitted to Inpatient with expected LOS greater than two midnights due to medical therapy. Chief Complaint:  bilat hands and R foot, ulcers, infection      History Of Present Illness:     28 y.o. male history of chronic hep C, IV drug use--heroin/fentanyl presented to the emergency room because of infection on his hands and right foot  He had recently been seen in the emergency room with cellulitis of the bilateral hands and feet with ulcerations due to skin popping intravenous drug use. . Reported small abscesses that drained spontaneously. Yonathan Burrows Recently treated with Bactrim but with no improvement. Reported no fevers, chills or rigors. .. On presentation, he had no drainable abscess. He was started on IV cefepime and vancomycin for cellulitis and admitted to the hospital    Past Medical History:          Diagnosis Date    ADHD (attention deficit hyperactivity disorder)     Cyst     testicles    Depression     Drug abuse, IV ([address]     heroine    Hemorrhoids     Hepatitis C     Heroin use        Past Surgical History:          Procedure Laterality Date    NASAL SEPTUM SURGERY      TONSILLECTOMY      TYMPANOSTOMY TUBE PLACEMENT         Medications Prior to Admission:      Prior to Admission medications    Not on File       Allergies:  Clindamycin, Clindamycin/lincomycin, and Azithromycin    Social History:      The patient currently lives     TOBACCO:   reports that he has been smoking cigarettes. He has a 3.00 pack-year smoking history. His smokeless tobacco use includes chew. ETOH:   reports previous alcohol use. Family History:      Reviewed in detail and negative for DM, CAD, Cancer, CVA.  Positive as follows:        Problem Relation Age of Onset    Diabetes Mother     Depression Mother     Substance Abuse Mother    [facility] High Blood Pressure Maternal Grandmother     Mental Illness Father     Substance Abuse Brother        REVIEW OF SYSTEMS:   Pertinent positives as noted in the HPI. All other systems reviewed and negative. PHYSICAL EXAM:    /61   Pulse (!) 49   Temp 97.4 °F (36.3 °C) (Oral)   Resp 16   Ht 5' 10.5\" (1.791 m)   Wt 133 lb 9.6 oz (60.6 kg)   SpO2 98%   BMI 18.90 kg/m²     General appearance:  No apparent distress, appears stated age and cooperative. HEENT:  Normal cephalic, atraumatic without obvious deformity. Pupils equal, round, and reactive to light. Extra ocular muscles intact. Conjunctivae/corneas clear. Neck: Supple, with full range of motion. No jugular venous distention. Trachea midline. Respiratory:  Normal respiratory effort. Clear to auscultation, bilaterally without Rales/Wheezes/Rhonchi. Cardiovascular:  Regular rate and rhythm with normal S1/S2 without murmurs, rubs or gallops. Abdomen: Soft, non-tender, non-distended with normal bowel sounds. Musculoskeletal:  No clubbing, cyanosis or edema bilaterally. Full range of motion without deformity. Skin: Skin color, texture, turgor normal.  No rashes or lesions. Neurologic:  Neurovascularly intact without any focal sensory/motor deficits.  Cranial nerves: II-XII intact, grossly non-focal.  Psychiatric:  Alert and oriented, thought content appropriate, normal insight  Capillary Refill: Brisk,< 3 seconds   Peripheral Pulses: +2 palpable, equal bilaterally       CXR:  I have reviewed the CXR with the following interpretation:   EKG:  I have reviewed the EKG with the following interpretation:     Labs:     Recent Labs     04/10/22  2216   WBC 9.5   HGB 12.6*   HCT 37.5*        Recent Labs     04/10/22  2216      K 4.1      CO2 25   BUN 15   CREATININE 0.8*   CALCIUM 9.7     Recent Labs     04/10/22  2216   AST 28   ALT 20   BILIDIR <0.2   BILITOT <0.2   ALKPHOS 91     No results for input(s): INR in the last 72 hours. No results for input(s): Austin Dubose in the last 72 hours. Urinalysis:      Lab Results   Component Value Date    NITRU Negative 10/11/2021    WBCUA 8 10/11/2021    BACTERIA 4+ 10/11/2021    RBCUA 0 10/11/2021    BLOODU Negative 10/11/2021    SPECGRAV 1.014 10/11/2021    GLUCOSEU Negative 10/11/2021         ASSESSMENT:    -Cellulitis of the right foot and right hand --recently treated with Bactrim  -Ulcerations on both hands and right foot due to IV drug use  -IV drug use--heroin and fentanyl  -Chronic hep C--untreated      PLAN:  Continue IV cefepime and vancomycin  Wound care  Follow-up on culture data  Opiate withdrawal protocol  Referral to drug rehab after discharge      Addendum  Patient has decided to leave AMA. . Does not want to detox in the hospital    DVT Prophylaxis: Lovenox  Diet: ADULT DIET; Regular  Code Status: Full Code         Yuly Krishnamurthy MD    Thank you No primary care provider on file. for the opportunity to be involved in this patient's care. If you have any questions or concerns please feel free to contact me at [phone].

## 2022-04-11 NOTE — ED NOTES
Nicole to transport pt to [facility]. ABDI @0300.       Cliff Mandujano, AIMEE  04/11/22 0140

## 2022-04-11 NOTE — CONSULTS
Clinical Pharmacy Note  Vancomycin Consult    Mack Author Kemp is a 28 y.o. male ordered Vancomycin for cellulitis/abscesses; consult received from Dr. Candelaria Tovar to manage therapy. Also receiving cefepime. Patient Active Problem List   Diagnosis    Substance induced mood disorder (HCC)    Opiate addiction (HCC)    Encephalopathy    Acute electrocardiogram changes    Drug use    AMS (altered mental status)    Syncope    Syncope and collapse    Cellulitis       Allergies:  Clindamycin, Clindamycin/lincomycin, and Azithromycin     Temp max:  Temp (24hrs), Av.8 °F (36.6 °C), Min:97.6 °F (36.4 °C), Max:98.2 °F (36.8 °C)      Recent Labs     04/10/22  2216   WBC 9.5       Recent Labs     04/10/22  2216   BUN 15   CREATININE 0.8*       No intake or output data in the 24 hours ending 22 0347      Ht Readings from Last 1 Encounters:   22 5' 10.5\" (1.791 m)        Wt Readings from Last 1 Encounters:   22 133 lb 9.6 oz (60.6 kg)         Estimated Creatinine Clearance: 118 mL/min (A) (based on SCr of 0.8 mg/dL (L)). Assessment/Plan:  Vancomycin 750 mg IV every 8 hours ordered. Regimen projects a trough level of 15.8 mg/L and an AUC of 499  Level ordered for 22 @2300. Thank you for the consult.    Connie Bumpers, FlacaD

## 2022-04-11 NOTE — PLAN OF CARE
Problem: Falls - Risk of:  Goal: Will remain free from falls  Description: Will remain free from falls  Outcome: Ongoing  Note: Fall risk assessment completed . Fall precautions in place, bed/ chair alarm on, side rails 2/4 up, call light in reach, educated pt on calling for assistance when needed, room clear of clutter. Pt verbalized understanding. Goal: Absence of physical injury  Description: Absence of physical injury  Outcome: Ongoing  Note: No falls noted this shift. Patient ambulates with x1 staff assistance without difficulty. Family member at bedside, spent the night. Bed kept in low position. Safe environment maintained. Bedside table & call light in reach. Uses call light appropriately when needing assistance. Problem: Skin Integrity:  Goal: Will show no infection signs and symptoms  Description: Will show no infection signs and symptoms  Outcome: Ongoing  Note: Pt assessed for infection, No signs or symptoms of surgical site noted. VVS, WBC WNL. Reviewed information with pt and family, pt verbalized understanding     Goal: Absence of new skin breakdown  Description: Absence of new skin breakdown  Outcome: Ongoing  Note: Will monitor skin and mucous members. Will turn patient every 2 hours, monitor for friction and sheering, and change dressings as needed. Will preform skin assessment every shift. Problem: Pain:  Goal: Pain level will decrease  Description: Pain level will decrease  Outcome: Ongoing  Note: Pain /discomfort being managed with PRN analgesics per MD orders. Patient able to express presence and absence of pain and rate pain appropriately using numerical scale. Goal: Control of acute pain  Description: Control of acute pain  Outcome: Ongoing  Note: Pain /discomfort being managed with PRN analgesics per MD orders. Patient able to express presence and absence of pain and rate pain appropriately using numerical scale.

## 2022-04-11 NOTE — ED NOTES
Nicole here to transport pt to [facility]. Report and paperwork given. Pt in stable condition for transport.       [facility]  04/11/22 6775

## 2022-04-11 NOTE — CARE COORDINATION
Initial Assessment & Discharge Summary:     Per attending MD, patient plans to leave AMA. MD wants patient to have oral antibiotics. Per bedside Rn, patient requested script be sent to [facility] [address]. Spoke with patient and confirmed above. Patient is currently homeless, staying with friends/couch surfing. Patient plans to follow up with [address] [place] [address] after discharge. [facility] Denied other needs at the present time.      Electronically signed by JOYCE Russell, DEV, Case Management on 4/11/2022 at 12:00 PM  Nicolasa Henry [phone]

## 2022-04-11 NOTE — PROGRESS NOTES
4 Eyes Admission Assessment     I agree as the admission nurse that 2 RN's have performed a thorough Head to Toe Skin Assessment on the patient. ALL assessment sites listed below have been assessed on admission. Areas assessed by both nurses:   [x]   Head, Face, and Ears   [x]   Shoulders, Back, and Chest  [x]   Arms, Elbows, and Hands   [x]   Coccyx, Sacrum, and Ischum  [x]   Legs, Feet, and Heels        Does the Patient have Skin Breakdown?   Yes a wound was noted on the Admission Assessment and an LDA was Initiated documentation include the Tawnya-wound, Wound Assessment, Measurements, Dressing Treatment, Drainage, and Color\",         Thad Prevention initiated:  Yes   Wound Care Orders initiated:  Yes      WOC nurse consulted for Pressure Injury (Stage 3,4, Unstageable, DTI, NWPT, and Complex wounds):  Yes      Nurse 1 eSignature: Electronically signed by Ramírez Landon RN on 4/11/22 at 3:31 AM EDT    **SHARE this note so that the co-signing nurse is able to place an eSignature**    Nurse 2 eSignature: {Esignature:[id number]}

## 2022-04-11 NOTE — CARE COORDINATION
Wound consult noted for multiple abscesses from IVDU. Pt is going to leave AMA. Met with pt. Pleasant and cooperative. Given hibiclens for cleaning areas of abscesses/showering along with 2 dressing changes. Pt verbalized understanding.  Karyle Bonus, MSN, RN, [facility] [facility]

## 2022-04-11 NOTE — PROGRESS NOTES
Patient resting in bed this morning w/o complaint. Patient denies pain and/or symptoms of withdraw. Scheduled morning medications attempted to be administered - patient refused Lovenox and Subutex despite education regarding importance of both medications. Patient agreeable to scheduled IV antibiotics. IV in place to L wrist and functioning w/o complication. See eMAR for documentation of medication administration. Dressings in place to bilateral hands r/t abscesses. Dressings are clean, dry, and intact. Wounds to bilateral feet are open to air. Clean socks in place. Wound care consult in place. Patient denies physical/emotional needs at this time. Call light, telephone, and bed side table are within reach. Fall precautions in place. Will continue to monitor and assess.

## 2022-04-11 NOTE — PLAN OF CARE
Problem: Falls - Risk of:  Goal: Will remain free from falls  Description: Will remain free from falls  4/11/2022 1144 by Gena Closs, RN  Outcome: Ongoing  Note: Patient remains free from falls during this shift. Fall precautions remain in place. Patient educated on the need to implement call light use prior to ambulation. Will continue to monitor and assess. 4/11/2022 0339 by Merline Cano RN  Outcome: Ongoing  Note: Fall risk assessment completed . Fall precautions in place, bed/ chair alarm on, side rails 2/4 up, call light in reach, educated pt on calling for assistance when needed, room clear of clutter. Pt verbalized understanding. Goal: Absence of physical injury  Description: Absence of physical injury  4/11/2022 1144 by Gena Closs, RN  Outcome: Ongoing  Note: Patient remains free from physical harm during this shift. Will continue to monitor and assess patient. 4/11/2022 0339 by Merline Cano RN  Outcome: Ongoing  Note: No falls noted this shift. Patient ambulates with x1 staff assistance without difficulty. Family member at bedside, spent the night. Bed kept in low position. Safe environment maintained. Bedside table & call light in reach. Uses call light appropriately when needing assistance. Problem: Skin Integrity:  Goal: Will show no infection signs and symptoms  Description: Will show no infection signs and symptoms  4/11/2022 1144 by Gena Closs, RN  Outcome: Ongoing  Note: Patient remains free from new signs and symptoms of infection during this shift. Infection prevention measures are in place. Will continue to monitor for alterations in patient condition throughout the shift. 4/11/2022 0339 by Merline Cano RN  Outcome: Ongoing  Note: Pt assessed for infection, No signs or symptoms of surgical site noted. VVS, WBC WNL.  Reviewed information with pt and family, pt verbalized understanding     Goal: Absence of new skin breakdown  Description: Absence of new skin breakdown  4/11/2022 1144 by Amy Bowling RN  Outcome: Ongoing  Note: Patient skin condition and mucus membrane integrity remain unchanged during this shift. Skin breakdown prevention interventions are in place. Will continue to monitor and assess. 4/11/2022 0339 by Xin Simpson RN  Outcome: Ongoing  Note: Will monitor skin and mucous members. Will turn patient every 2 hours, monitor for friction and sheering, and change dressings as needed. Will preform skin assessment every shift. Problem: Pain:  Goal: Pain level will decrease  Description: Pain level will decrease  4/11/2022 1144 by Amy Bowling RN  Outcome: Ongoing  Note: Pain managed with pharmacologic and non-pharmacologic interventions during this shift. Will continue to monitor and assess for needs and change in patient condition. 4/11/2022 0339 by Xin Simpson RN  Outcome: Ongoing  Note: Pain /discomfort being managed with PRN analgesics per MD orders. Patient able to express presence and absence of pain and rate pain appropriately using numerical scale. Goal: Control of acute pain  Description: Control of acute pain  4/11/2022 1144 by Amy Bowling RN  Outcome: Ongoing  Note: Pain managed with pharmacologic and non-pharmacologic interventions during this shift. Will continue to monitor and assess for needs and change in patient condition. 4/11/2022 0339 by Xin Simpson RN  Outcome: Ongoing  Note: Pain /discomfort being managed with PRN analgesics per MD orders. Patient able to express presence and absence of pain and rate pain appropriately using numerical scale.         Problem: Discharge Planning:  Goal: Absence of hematoma at arterial access site  Description: Participation in substance abuse program  Outcome: Ongoing     Problem: Injury - Risk of, Substance Overdose:  Goal: No signs of physiological stress  Description: Absence of drug withdrawal signs and symptoms  Outcome: Ongoing  Note: No s/s of distress noted this shift. Goal: Ability to remain free from injury will improve  Description: Ability to remain free from injury will improve  Outcome: Ongoing  Note: No new injury noted this shift      Problem: Mood - Altered:  Goal: Mood stable  Description: Mood stable  Outcome: Ongoing  Note: Patient very pleasant this shift, mood stable.  Will continue to monitor and assess

## 2022-04-11 NOTE — ED NOTES
Pt experiencing mild pain and numbness at IV site with vanc infusing. Policastro notified. Verbal orders to slow rate.       Inder Delong RN  04/11/22 0002

## 2022-04-11 NOTE — PROGRESS NOTES
Patient requesting to leave AMA at this time as he states he is starting to experience withdraw symptoms. Patient educated on all pharmacologic interventions in place to manage withdraw symptoms, patient states these are ineffective and he would like to leave. IV removed from R wrist and dressing applied. AMA paperwork completed and witnessed by this RN and RN Yamilka Knowles. Update provided to MD Agus Stanton. MD sending in prescription for PO antibiotics to patient preferred pharmacy [facility]). Wound care supplies provided to patient by [facility] RN Caleb Vega. Outpatient resources provided by Naun Cordon to help manage addition. Patient denies needs from this RN prior to leaving. Patient able to ambulate safely to hospital lobby independently. All belongings sent w/ patient.

## 2022-04-11 NOTE — ED NOTES
ED SBAR report provider to Roxy, RN. Patient to be transported to Room 3128 via stretcher by Rema Ribeiro. IV site clean, dry, and intact. MEWS score and pain assessed as  and documented. Updated patient on plan of care.      Betsy Matos [facility]  04/11/22 6786

## 2022-04-11 NOTE — ED NOTES
[id number]  Blood culture #2 drawn from right mid forearm at 2235. miles well.   (unable to place IV because site started to blow). Pain in hands and feet at 6. Aware and agreeable to admission. bilat feet very dirty on arrival.   Also arms dirty. Washed feet extensively with soapy wash cloths. Many alcohol pads used to clean and find IV/lab sites and multiple chlorhexadine swabs for 90 seconds prep for both sites. Clean slipper socks over bilat feet for now.  bilat hands wrapped with gauze and then secured with coban. Encouraged to keep these in place to help decrease contamination of surroundings from abscesses. Explained new orders. Refuses subutex because \"it will put me into precipitated withdrawal\". EMD updated    Report to [facility] [place] [facility] who will assume care of pt.      Damir Bailey RN  04/10/22 1624 [facility], RN  04/10/22 8336

## 2022-04-11 NOTE — DISCHARGE SUMMARY
Hospital Discharge Summary    Patient's PCP: No primary care provider on file. Admit Date: 4/10/2022   Discharge Date: 4/11/2022 LEFT AMA    Admitting Physician: Dr. Eh Blackburn DO  Discharge Physician: Dr. Madhavi Cespedes MD   Consults: none    Brief HPI:     28 y.o. male history of chronic hep C, IV drug use--heroin/fentanyl presented to the emergency room because of infection on his hands and right foot  He had recently been seen in the emergency room with cellulitis of the bilateral hands and feet with ulcerations due to skin popping intravenous drug use. . Reported small abscesses that drained spontaneously. Flako Solano Recently treated with Bactrim but with no improvement. Reported no fevers, chills or rigors. .. On presentation, he had no drainable abscess. He was started on IV cefepime and vancomycin for cellulitis and admitted to the hospital    Brief hospital course:     -Cellulitis of the right foot and right hand --recently treated with Bactrim  -Ulcerations on both hands and right foot due to IV drug use  -IV drug use--heroin and fentanyl  -Chronic hep C--untreated    Patient unfortunately left AMA within hours of admission. .. Antibiotics-Augmentin plus doxycycline faxed to the pharmacy. . Advised to return to the ED if no improvement or follow-up with PCP soon as possible. .. Drug rehab strongly recommended    Invasive procedures:  none    Discharge Diagnoses:   Principal Problem:    Cellulitis  Resolved Problems:    * No resolved hospital problems. *      Physical Exam: /61   Pulse (!) 49   Temp 97.4 °F (36.3 °C) (Oral)   Resp 16   Ht 5' 10.5\" (1.791 m)   Wt 133 lb 9.6 oz (60.6 kg)   SpO2 98%   BMI 18.90 kg/m²   Gen/overall appearance: Not in acute distress. Alert. Head: Normocephalic, atraumatic  Eyes: EOMI, good acuity  ENT:- Oral mucosa moist  Neck: No JVD, thyromegaly  CVS: Nml S1S2, no MRG, RRR  Pulm: Clear bilaterally.  No crackles/wheezes  Gastrointestinal: Soft, NT/ND, +BS  Musculoskeletal: No edema. Warm  Neuro: No focal deficit. Moves extremity spontaneously. Psychiatry: Appropriate affect. Not agitated. Skin: Warm, dry with normal turgor. No rash        Significant Diagnostic Studies:    See above      Treatments: As above. Discharge Medications:     Medication List      START taking these medications    amoxicillin-clavulanate 875-125 MG per tablet  Commonly known as: AUGMENTIN  Take 1 tablet by mouth 2 times daily for 10 days     doxycycline hyclate 100 MG tablet  Commonly known as: VIBRA-TABS  Take 1 tablet by mouth 2 times daily for 10 days        STOP taking these medications    naproxen 500 MG tablet  Commonly known as: Naprosyn     sulfamethoxazole-trimethoprim 800-160 MG per tablet  Commonly known as: Bactrim DS           Where to Get Your Medications      These medications were sent to [facility], [address] [place] [address] [address]    Phone: [phone]   · amoxicillin-clavulanate 875-125 MG per tablet  · doxycycline hyclate 100 MG tablet         Activity: activity as tolerated  Diet: ADULT DIET; Regular      Disposition: AMA  Discharged Condition: Stable  Follow Up:   No follow-up provider specified. Code status:  Full Code         Total time spent on discharge, finalizing medications, referrals and arranging outpatient follow up was more than 45 minutes      Thank you Dr. Ahumada primary care provider on file. for the opportunity to be involved in this patients care.

## 2022-04-15 LAB
BLOOD CULTURE, ROUTINE: NORMAL
BLOOD CULTURE, ROUTINE: NORMAL

## 2022-05-12 ENCOUNTER — HOSPITAL ENCOUNTER (EMERGENCY)
Age: 27
Discharge: HOME | End: 2022-05-12
Attending: EMERGENCY MEDICINE
Payer: MEDICAID

## 2022-05-12 VITALS
SYSTOLIC BLOOD PRESSURE: 138 MMHG | HEART RATE: 66 BPM | DIASTOLIC BLOOD PRESSURE: 88 MMHG | BODY MASS INDEX: 18.61 KG/M2 | TEMPERATURE: 97.8 F | RESPIRATION RATE: 14 BRPM | HEIGHT: 70 IN | OXYGEN SATURATION: 98 % | WEIGHT: 130 LBS

## 2022-05-12 DIAGNOSIS — R11.0 NAUSEA: Primary | ICD-10-CM

## 2022-05-12 DIAGNOSIS — F11.20 POLYSUBSTANCE DEPENDENCE INCLUDING OPIOID TYPE DRUG, EPISODIC ABUSE (HCC): ICD-10-CM

## 2022-05-12 DIAGNOSIS — F19.20 POLYSUBSTANCE DEPENDENCE INCLUDING OPIOID TYPE DRUG, EPISODIC ABUSE (HCC): ICD-10-CM

## 2022-05-12 PROCEDURE — 99283 EMERGENCY DEPT VISIT LOW MDM: CPT

## 2022-05-12 PROCEDURE — 6370000000 HC RX 637 (ALT 250 FOR IP): Performed by: EMERGENCY MEDICINE

## 2022-05-12 RX ORDER — ONDANSETRON 4 MG/1
4 TABLET, ORALLY DISINTEGRATING ORAL ONCE
Status: COMPLETED | OUTPATIENT
Start: 2022-05-12 | End: 2022-05-12

## 2022-05-12 RX ORDER — ONDANSETRON 4 MG/1
4 TABLET, ORALLY DISINTEGRATING ORAL EVERY 8 HOURS PRN
Qty: 20 TABLET | Refills: 0 | Status: SHIPPED | OUTPATIENT
Start: 2022-05-12

## 2022-05-12 RX ADMIN — ONDANSETRON 4 MG: 4 TABLET, ORALLY DISINTEGRATING ORAL at 10:12

## 2022-05-12 ASSESSMENT — LIFESTYLE VARIABLES: HOW OFTEN DO YOU HAVE A DRINK CONTAINING ALCOHOL: NEVER

## 2022-05-12 ASSESSMENT — PAIN - FUNCTIONAL ASSESSMENT
PAIN_FUNCTIONAL_ASSESSMENT: NONE - DENIES PAIN
PAIN_FUNCTIONAL_ASSESSMENT: 0-10
PAIN_FUNCTIONAL_ASSESSMENT: 0-10

## 2022-05-12 ASSESSMENT — PAIN SCALES - GENERAL
PAINLEVEL_OUTOF10: 0
PAINLEVEL_OUTOF10: 0

## 2022-05-12 NOTE — ED NOTES
Awoke easily  Aware he is to be discharged  Calling a kimoe      Dana Almazan [facility]  05/12/22 1125

## 2022-05-12 NOTE — LETTER
[place] [address] [address]  Phone: [phone]               May 12, 2022    Patient: Agueda Kowalski   YOB: 1995   Date of Visit: 5/12/2022       To Whom It May Concern:    Mabelene Litten was seen and treated in our emergency department on 5/12/2022. He may return to work on May 13th. Off work May 12th.       Sincerely,       Jey Grullon RN         Signature:__________________________________

## 2022-05-12 NOTE — ED PROVIDER NOTES
eMERGENCY dEPARTMENT eNCOUnter      Pt Name: Vincent Khan  MRN: [id number]  [place] 1995  Date of evaluation: 5/12/2022  Provider: Jonathan Hernandez MD     [address] [place] [address]       Chief Complaint   Patient presents with    Cough     had emesis 3 days ago  Feels auseated but has bag of donuts and drinking a soda         HISTORY OF PRESENT ILLNESS   (Location/Symptom, Timing/Onset,Context/Setting, Quality, Duration, Modifying Factors, Severity) Note limiting factors. HPI    Mack Tinajero is a 28 y.o. male who presents to the emergency department with history of IV drug abuse last use of fentanyl yesterday. Patient had multiple tracking and abscesses in all his upper extremities. She states he has been vomiting about 3 times for the past several days. Feels nauseated. But has been sipping on some Sprite and has a bag of donuts in the room with him. Patient has no abdominal pain. Patient is waiting for rehab at the [facility]. Nursing Notes were reviewed. REVIEW OFSYSTEMS    (2+ for level 4; 10+ for level 5)   Review of Systems    General: No fevers, chills or night sweats, No weight loss    Head:  No Sore throat,  No Ear Pain    Chest:  Nontender. There is a mild cough, No SOB,  Chest Pain    GI: No abdominal pain but has some nausea and vomiting    : No dysuria or hematuria    Musculoskeletal: No unrelenting pain or night pain    Neurologic: No bowel or bladder incontinence, No saddle anesthesia, No leg weakness    All other systems reviewed and are negative.         PAST MEDICAL HISTORY     Past Medical History:   Diagnosis Date    ADHD (attention deficit hyperactivity disorder)     Cyst     testicles    Depression     Drug abuse, IV ([facility] [address]     heroine    Hemorrhoids     Hepatitis C     Heroin use        SURGICAL HISTORY       Past Surgical History:   Procedure Laterality Date    NASAL SEPTUM SURGERY      TONSILLECTOMY      TYMPANOSTOMY TUBE PLACEMENT         CURRENT MEDICATIONS There are no discharge medications for this patient. ALLERGIES     Clindamycin, Clindamycin/lincomycin, and Azithromycin    FAMILY HISTORY       Family History   Problem Relation Age of Onset    Diabetes Mother     Depression Mother     Substance Abuse Mother     High Blood Pressure Maternal Grandmother     Mental Illness Father     Substance Abuse Brother         SOCIAL HISTORY       Social History     Socioeconomic History    Marital status: Single     Spouse name: None    Number of children: None    Years of education: None    Highest education level: None   Occupational History    Occupation: unemployed   Tobacco Use    Smoking status: Current Every Day Smoker     Packs/day: 0.50     Years: 6.00     Pack years: 3.00     Types: Cigarettes    Smokeless tobacco: Current User     Types: Chew   Vaping Use    Vaping Use: Never used   Substance and Sexual Activity    Alcohol use: Not Currently     Comment: social    Drug use: Yes     Frequency: 7.0 times per week     Types: Marijuana (Mary Crome), IV, Opiates      Comment: 4/10/22 fentanyl and marijuana (last time today for both)    Sexual activity: Yes   Other Topics Concern    None   Social History Narrative    None     Social Determinants of Health     Financial Resource Strain:     Difficulty of Paying Living Expenses: Not on file   Food Insecurity:     Worried About Running Out of Food in the Last Year: Not on file    Mallorie of Food in the Last Year: Not on file   Transportation Needs:     Lack of Transportation (Medical): Not on file    Lack of Transportation (Non-Medical):  Not on file   Physical Activity:     Days of Exercise per Week: Not on file    Minutes of Exercise per Session: Not on file   Stress:     Feeling of Stress : Not on file   Social Connections:     Frequency of Communication with Friends and Family: Not on file    Frequency of Social Gatherings with Friends and Family: Not on file    Attends Protestant Services: Not on file    Active Member of Clubs or Organizations: Not on file    Attends Club or Organization Meetings: Not on file    Marital Status: Not on file   Intimate Partner Violence:     Fear of Current or Ex-Partner: Not on file    Emotionally Abused: Not on file    Physically Abused: Not on file    Sexually Abused: Not on file   Housing Stability:     Unable to Pay for Housing in the Last Year: Not on file    Number of [place] in the Last Year: Not on file    Unstable Housing in the Last Year: Not on file       SCREENINGS    Jacinto Coma Scale  Eye Opening: Spontaneous  Best Verbal Response: Oriented  Best Motor Response: Obeys commands  [place] Coma Scale Score: 15      PHYSICAL EXAM    (up to 7 for level 4, 8 or more for level 5)     ED Triage Vitals [05/12/22 0958]   BP Temp Temp Source Pulse Resp SpO2 Height Weight   (!) 142/91 97.8 °F (36.6 °C) Oral 68 16 98 % 5' 10\" (1.778 m) 130 lb (59 kg)       Physical Exam    General: Alert and awake ×3. Nontoxic appearance. This is a very thin chronically ill 54-year-old male. He is very pleasant. Very polite. No respiratory distress  HEENT: Normocephalic atraumatic. Neck is supple. Airway intact. No adenopathy  Cardiac: Regular rate and rhythm with no murmurs rubs or gallops  Pulmonary: Lungs are clear in all lung fields. No wheezing. No Rales. Abdomen: Soft and nontender. Negative hepatosplenomegaly. Bowel sounds are active  Extremities: Moving all extremities. No calf tenderness. Peripheral pulses all intact track marks upper extremities. Old healed up wounds noted. Abscesses noted but he will. Has been Augmentin. No signs of active infection. Skin: No skin lesions. No rashes  Neurologic: Cranial nerves II through XII was grossly intact. Nonfocal neurological exam  Psychiatric: Patient is pleasant. Mood is appropriate.         DIAGNOSTIC RESULTS     EKG (Per Emergency Physician):       RADIOLOGY (Per Emergency Physician): Interpretation per the Radiologist below, if available at the time of this note:  No results found. ED BEDSIDE ULTRASOUND:   Performed by ED Physician - none    LABS:  Labs Reviewed - No data to display     All other labs were within normal range or not returned as of this dictation. Procedures      EMERGENCY DEPARTMENT COURSE and DIFFERENTIAL DIAGNOSIS/MDM:   Vitals:    Vitals:    05/12/22 0958 05/12/22 1100   BP: (!) 142/91 138/88   Pulse: 68 66   Resp: 16 14   Temp: 97.8 °F (36.6 °C)    TempSrc: Oral    SpO2: 98%    Weight: 130 lb (59 kg)    Height: 5' 10\" (1.778 m)        Medications   ondansetron (ZOFRAN-ODT) disintegrating tablet 4 mg (4 mg Oral Given 5/12/22 1012)       MDM. This is a very thin 28year-old drug user presents with some nausea and vomiting no diarrhea. No abdominal pain exam is reassuring. Chronic track marks noted. Continues to use fentanyl. Patient was given Zofran with relief. He is able to do p.o. challenge well. No vomiting. Nausea improved patient will be placed on some Zofran's as a prescription. Patient discharged in improved condition follow-up. REVAL:         CRITICAL CARE TIME   Total CriticalCare time was 0 minutes, excluding separately reportable procedures. There was a high probability of clinically significant/life threatening deterioration in the patient's condition which required my urgent intervention. CONSULTS:  None    PROCEDURES:  Unless otherwise noted below, none     [unfilled]    FINAL IMPRESSION      1. Nausea    2. Polysubstance dependence including opioid type drug, episodic abuse ([facility] [address]          DISPOSITION/PLAN   DISPOSITION        PATIENT REFERRED TO:  Family physician    Schedule an appointment as soon as possible for a visit in 1 week  If symptoms worsen      DISCHARGE MEDICATIONS:  There are no discharge medications for this patient.          (Please note:  Portions of this note were completed with a voice recognition program.Efforts were made to edit the dictations but occasionally words and phrases are mis-transcribed.)  Form v2016. J.5-cn    Echo JIMENES MD (electronically signed)  Emergency Medicine Provider        Savage Damon MD  05/12/22 70 Lorri Adamson MD  05/22/22 3732

## 2022-06-07 ENCOUNTER — HOSPITAL ENCOUNTER (EMERGENCY)
Age: 27
Discharge: LEFT BEFORE DISCHARGE | End: 2022-06-07
Attending: STUDENT IN AN ORGANIZED HEALTH CARE EDUCATION/TRAINING PROGRAM
Payer: MEDICAID

## 2022-06-07 VITALS
SYSTOLIC BLOOD PRESSURE: 121 MMHG | RESPIRATION RATE: 16 BRPM | BODY MASS INDEX: 19.54 KG/M2 | HEIGHT: 70 IN | TEMPERATURE: 98.5 F | DIASTOLIC BLOOD PRESSURE: 65 MMHG | WEIGHT: 136.47 LBS | OXYGEN SATURATION: 99 % | HEART RATE: 55 BPM

## 2022-06-07 DIAGNOSIS — L03.114 CELLULITIS OF LEFT UPPER EXTREMITY: Primary | ICD-10-CM

## 2022-06-07 DIAGNOSIS — L98.9 SKIN LESION DUE TO INTRAVENOUS DRUG ABUSE (HCC): ICD-10-CM

## 2022-06-07 DIAGNOSIS — F19.188 SKIN LESION DUE TO INTRAVENOUS DRUG ABUSE (HCC): ICD-10-CM

## 2022-06-07 PROCEDURE — 99283 EMERGENCY DEPT VISIT LOW MDM: CPT

## 2022-06-07 RX ORDER — DOXYCYCLINE HYCLATE 100 MG
100 TABLET ORAL 2 TIMES DAILY
Qty: 14 TABLET | Refills: 0 | Status: SHIPPED | OUTPATIENT
Start: 2022-06-07 | End: 2022-06-14

## 2022-06-07 RX ORDER — AMOXICILLIN AND CLAVULANATE POTASSIUM 875; 125 MG/1; MG/1
1 TABLET, FILM COATED ORAL 2 TIMES DAILY
Qty: 14 TABLET | Refills: 0 | Status: SHIPPED | OUTPATIENT
Start: 2022-06-07 | End: 2022-06-14

## 2022-06-08 ASSESSMENT — ENCOUNTER SYMPTOMS
COLOR CHANGE: 1
ABDOMINAL PAIN: 0
SHORTNESS OF BREATH: 0
DIARRHEA: 0
SORE THROAT: 0
COUGH: 0
BACK PAIN: 0
NAUSEA: 0
EYE PAIN: 0
VOMITING: 0

## 2022-06-08 NOTE — ED NOTES
Pt refusing admission at this time. Pt understands risks. AMA paper signed. Pt discharged with prescriptions and told to return if anything got worse.       Cuca Velásquez RN  06/07/22 9173

## 2022-06-08 NOTE — ED PROVIDER NOTES
[address]      Pt Name: Tabitha Gill  MRN: [id number]  [place] 1995  Date of evaluation: 6/7/2022  Provider: Doreen Epps MD    CHIEF COMPLAINT       Chief Complaint   Patient presents with    Cellulitis     left hand looks infected after shooting up IV fentanyl, pt states its been swollen for 2 weeks, [facility] and wants antibioitcs     L hand pain and redness    HISTORY OF PRESENT ILLNESS   (Location/Symptom, Timing/Onset,Context/Setting, Quality, Duration, Modifying Factors, Severity)  Note limiting factors. Tabitha Gill is a 28 y.o. male who presents to the ED with a chief complaint of L hand pain and redness for several days, unk exact time of onset, pt has been shooting fentanyl into his bilateral hands, mainly the L hand for the past several days and now has L hand swelling and redness with multiple open wounds, ulcerations. He requests antibiotics for treatment so that [facility] can [facility] to the [facility]. Has associated decreased ROM to the L thumb. Denies fevers, focal weakness, sensory loss, nausea, vomiting. Symptoms not otherwise alleviated or exacerbated by other factors. NursingNotes were reviewed. REVIEW OF SYSTEMS    (2-9 systems for level 4, 10 or more for level 5)     Review of Systems   Constitutional: Negative for chills and fever. HENT: Negative for congestion and sore throat. Eyes: Negative for pain and visual disturbance. Respiratory: Negative for cough and shortness of breath. Cardiovascular: Negative for chest pain and palpitations. Gastrointestinal: Negative for abdominal pain, diarrhea, nausea and vomiting. Genitourinary: Negative for dysuria and frequency. Musculoskeletal: Positive for arthralgias, joint swelling and myalgias. Negative for back pain and neck pain. Skin: Positive for color change and wound. Negative for rash.    Neurological: Negative for dizziness, weakness and light-headedness.         PAST MEDICAL HISTORY     Past Medical History:   Diagnosis Date    ADHD (attention deficit hyperactivity disorder)     Cyst     testicles    Depression     Drug abuse, IV ([address]     heroine    Hemorrhoids     Hepatitis C     Heroin use          SURGICALHISTORY       Past Surgical History:   Procedure Laterality Date    NASAL SEPTUM SURGERY      TONSILLECTOMY      TYMPANOSTOMY TUBE PLACEMENT           CURRENT MEDICATIONS       Discharge Medication List as of 6/7/2022 10:20 PM      CONTINUE these medications which have NOT CHANGED    Details   ondansetron (ZOFRAN ODT) 4 MG disintegrating tablet Take 1 tablet by mouth every 8 hours as needed for Nausea, Disp-20 tablet, R-0Normal             ALLERGIES     Clindamycin, Clindamycin/lincomycin, and Azithromycin    FAMILY HISTORY       Family History   Problem Relation Age of Onset    Diabetes Mother     Depression Mother     Substance Abuse Mother     High Blood Pressure Maternal Grandmother     Mental Illness Father     Substance Abuse Brother           SOCIAL HISTORY       Social History     Socioeconomic History    Marital status: Single     Spouse name: None    Number of children: None    Years of education: None    Highest education level: None   Occupational History    Occupation: unemployed   Tobacco Use    Smoking status: Current Every Day Smoker     Packs/day: 0.50     Years: 6.00     Pack years: 3.00     Types: Cigarettes    Smokeless tobacco: Current User     Types: Chew   Vaping Use    Vaping Use: Never used   Substance and Sexual Activity    Alcohol use: Not Currently     Comment: social    Drug use: Yes     Frequency: 7.0 times per week     Types: Marijuana (Weed), IV, Opiates      Comment: fenanyl IV    Sexual activity: Yes   Other Topics Concern    None   Social History Narrative    None     Social Determinants of Health     Financial Resource Strain:     Difficulty of Paying Living Expenses: Not on file   Food Insecurity:     Worried About [employer] [employer] in the Last Year: Not on file    Mallorie of Food in the Last Year: Not on file   Transportation Needs:     Lack of Transportation (Medical): Not on file    Lack of Transportation (Non-Medical): Not on file   Physical Activity:     Days of Exercise per Week: Not on file    Minutes of Exercise per Session: Not on file   Stress:     Feeling of Stress : Not on file   Social Connections:     Frequency of Communication with Friends and Family: Not on file    Frequency of Social Gatherings with Friends and Family: Not on file    Attends Congregation Services: Not on file    Active Member of [address] [place] [address] or Organizations: Not on file    Attends Club or Organization Meetings: Not on file    Marital Status: Not on file   Intimate Partner Violence:     Fear of Current or Ex-Partner: Not on file    Emotionally Abused: Not on file    Physically Abused: Not on file    Sexually Abused: Not on file   Housing Stability:     Unable to Pay for Housing in the Last Year: Not on file    Number of [place] in the Last Year: Not on file    Unstable Housing in the Last Year: Not on file       SCREENINGS    [place] Coma Scale  Eye Opening: Spontaneous  Best Verbal Response: Oriented  Best Motor Response: Obeys commands  [place] Coma Scale Score: 15        PHYSICAL EXAM    (up to 7 for level 4, 8 or more for level 5)     ED Triage Vitals [06/07/22 2116]   BP Temp Temp Source Heart Rate Resp SpO2 Height Weight   121/65 98.5 °F (36.9 °C) Oral 55 16 99 % 5' 10\" (1.778 m) 136 lb 7.4 oz (61.9 kg)       General: Alert and oriented appropriately for age, No acute distress. Eye: Normal conjunctiva. Sclera anicteric. HENT: Oral mucosa is moist.  Respiratory: Respirations even and non-labored. Cardiovascular: Normal rate, Regular rhythm. Gastrointestinal: Soft, Non-tender, Non-distended. : deferred.   Musculoskeletal: significant soft tissue swelling to the L hand with multiple open ulcerations to the dorsum of the hand, erythematous changes to the thenar aspect of the dorsal hand, decreased ROM to the L thumb at the MCPJ, given decreased ROM and swelling, unable to oppose the 5th finger with the thumb. 1st MCPJ is ttp. Integumentary: Warm, see the attached photo for the degree of ulceration and erythema. Neurologic: Alert and appropriate for age. No focal deficits. Psychiatric: Cooperative. DIAGNOSTIC RESULTS         EMERGENCY DEPARTMENT COURSE and DIFFERENTIAL DIAGNOSIS/MDM:   Vitals:    Vitals:    22 2116   BP: 121/65   Pulse: 55   Resp: 16   Temp: 98.5 °F (36.9 °C)   TempSrc: Oral   SpO2: 99%   Weight: 136 lb 7.4 oz (61.9 kg)   Height: 5' 10\" (1.778 m)         Medical decision makin yo M with h/o IV opiate use who p/w L hand pain, swelling and redness, c/f cellulitis, deep space hand infection, L 1st MCPJ septic arthritis clinically, informed pt of need for IV abx, possible surgical washout, pt voiced understanding but not amenable to stay for IV tx and declined admission to the hospital, voiced understanding of potential loss of function, development of gangrene, necrosis, tissue death, systemic infection and potentially loss of life, this discussion was undertaken in lay terminology and pt voiced understanding, stated that wanted to go to rehab and that they would take him as long as he was on antibiotics. Reiterated importance of monitoring and need for inpt therapy and pt again voiced understanding, stated would return with any worsening. Did Rx abx to include gram -ve and MRSA coverage. I have recommended admission to the hospital, but Mack Wynne refuses. The risks (including but not limited to suffering and death) as well as the benefits were explained to the patient. Questions were sought and answered, the patient voiced understanding and accepts these risks.  I have encouraged the patient to return to have their evaluation completed as we are glad to do so. I have also instructed Mack Delacruz on the importance of follow-up and to return for any worsening or worrisome concerns. Mack Ventura appears to have capacity to make medical decisions at this time. AMA form signed and placed on the chart. Is this patient to be included in the SEP-1 Core Measure due to severe sepsis or septic shock? No   Exclusion criteria - the patient is NOT to be included for SEP-1 Core Measure due to:  2+ SIRS criteria are not met              FINAL IMPRESSION      1. Cellulitis of left upper extremity    2.  Skin lesion due to intravenous drug abuse [facility])          DISPOSITION/PLAN   DISPOSITION Wisdom 06/07/2022 09:57:08 PM      PATIENT REFERRED TO:  [address]  [phone]    at any time for reevaluation and treatment      DISCHARGE MEDICATIONS:  Discharge Medication List as of 6/7/2022 10:20 PM      START taking these medications    Details   doxycycline hyclate (VIBRA-TABS) 100 MG tablet Take 1 tablet by mouth 2 times daily for 7 days, Disp-14 tablet, R-0Print      amoxicillin-clavulanate (AUGMENTIN) 875-125 MG per tablet Take 1 tablet by mouth 2 times daily for 7 days, Disp-14 tablet, R-0Print                (Please note that portions of this note were completed with a voice recognition program.Efforts were made to edit the dictations but occasionally words are mis-transcribed.)    Iban Adair MD (electronically signed)  Attending Emergency Physician          Iban Adair MD  06/08/22 0682

## 2022-06-15 ENCOUNTER — HOSPITAL ENCOUNTER (EMERGENCY)
Age: 27
Discharge: LEFT BEFORE DISCHARGE | End: 2022-06-16
Attending: STUDENT IN AN ORGANIZED HEALTH CARE EDUCATION/TRAINING PROGRAM

## 2022-06-15 DIAGNOSIS — Z53.21 PATIENT LEFT WITHOUT BEING SEEN: Primary | ICD-10-CM

## 2022-07-05 ENCOUNTER — HOSPITAL ENCOUNTER (EMERGENCY)
Age: 27
Discharge: HOME | End: 2022-07-05
Attending: EMERGENCY MEDICINE
Payer: MEDICAID

## 2022-07-05 VITALS
SYSTOLIC BLOOD PRESSURE: 104 MMHG | HEIGHT: 70 IN | HEART RATE: 52 BPM | OXYGEN SATURATION: 98 % | TEMPERATURE: 97.7 F | WEIGHT: 132.06 LBS | DIASTOLIC BLOOD PRESSURE: 70 MMHG | RESPIRATION RATE: 18 BRPM | BODY MASS INDEX: 18.91 KG/M2

## 2022-07-05 DIAGNOSIS — L03.114 CELLULITIS OF LEFT UPPER EXTREMITY: Primary | ICD-10-CM

## 2022-07-05 PROCEDURE — 6370000000 HC RX 637 (ALT 250 FOR IP): Performed by: EMERGENCY MEDICINE

## 2022-07-05 PROCEDURE — 99283 EMERGENCY DEPT VISIT LOW MDM: CPT

## 2022-07-05 RX ORDER — DOXYCYCLINE HYCLATE 100 MG
100 TABLET ORAL 2 TIMES DAILY
Qty: 14 TABLET | Refills: 0 | Status: SHIPPED | OUTPATIENT
Start: 2022-07-05 | End: 2022-07-12

## 2022-07-05 RX ORDER — AMOXICILLIN AND CLAVULANATE POTASSIUM 875; 125 MG/1; MG/1
1 TABLET, FILM COATED ORAL EVERY 12 HOURS SCHEDULED
Status: DISCONTINUED | OUTPATIENT
Start: 2022-07-05 | End: 2022-07-06 | Stop reason: HOSPADM

## 2022-07-05 RX ORDER — DOXYCYCLINE HYCLATE 100 MG
100 TABLET ORAL ONCE
Status: COMPLETED | OUTPATIENT
Start: 2022-07-05 | End: 2022-07-05

## 2022-07-05 RX ORDER — AMOXICILLIN AND CLAVULANATE POTASSIUM 875; 125 MG/1; MG/1
1 TABLET, FILM COATED ORAL 2 TIMES DAILY
Qty: 20 TABLET | Refills: 0 | Status: SHIPPED | OUTPATIENT
Start: 2022-07-05 | End: 2022-07-15

## 2022-07-05 RX ADMIN — AMOXICILLIN AND CLAVULANATE POTASSIUM 1 TABLET: 875; 125 TABLET, FILM COATED ORAL at 21:59

## 2022-07-05 RX ADMIN — DOXYCYCLINE HYCLATE 100 MG: 100 TABLET, COATED ORAL at 21:59

## 2022-07-05 ASSESSMENT — PAIN - FUNCTIONAL ASSESSMENT: PAIN_FUNCTIONAL_ASSESSMENT: NONE - DENIES PAIN

## 2022-07-06 ASSESSMENT — ENCOUNTER SYMPTOMS: COLOR CHANGE: 0

## 2022-07-06 NOTE — ED NOTES
Pt discharged back home, reviewed discharged instructions with pt follow up care ,and return precautions discussed , pt verbalized understanding.  Pt ambulated out of the department with steady gait          Madhavi Thomas RN  07/05/22 0423

## 2022-07-06 NOTE — ED NOTES
Pt called  rx were sent to retail at [facility]  He did not want them there.   Attempted to call to [facility] in [facility] and they were not taking phone calls  So they were called to [facility] on [address] per his request     Ivan Wilkinson RN  07/06/22 1917

## 2022-07-06 NOTE — ED TRIAGE NOTES
Pt has history of IVDU, presents to ED with worsening wounds to both hands ,  Was on abx, finished them about two weeks ago. Pt states the wounds are not getting better thus presenting back to ED. Pt denies pain at this time.

## 2022-07-06 NOTE — ED PROVIDER NOTES
[address] [facility]      Pt Name: Nory Ordoñez  MRN: [id number]  [place] 1995  Date ofevaluation: 7/5/2022  Provider: Kwesi Lamas MD    CHIEF COMPLAINT       Chief Complaint   Patient presents with    Wound Check         HISTORY OF PRESENT ILLNESS   (Location/Symptom, Timing/Onset,Context/Setting, Quality, Duration, Modifying Factors, Severity)  Note limiting factors. Nory Ordoñez is a 28 y.o. male  who  has a past medical history of ADHD (attention deficit hyperactivity disorder), Cyst, Depression, Drug abuse, IV ([address] Hemorrhoids, Hepatitis C, and Heroin use. who presents to the emergency department for evaluation of wounds to the bilateral hands. Patient reports has a history of IV drug abuse. He states that he developed multiple areas of abscess and cellulitis that are in various states of healing. Patient recently hospitalized due to similar symptoms on his feet. Patient signed out [place] before treatment could be completed. states that he has not had antibiotics in over a week. He denies fevers numbness or weakness he reports that wounds are actually improving compared to what they were a few days prior. He reports he has been trying to keep them clean with soap and water and using topical antibiotic ointments. Denies chest pain shortness of breath nausea or vomiting. He reports that he is attempting to get into a treatment facility for IV drug use. HPI    NursingNotes were reviewed. REVIEW OF SYSTEMS    (2-9 systems for level 4, 10 or more for level 5)     Review of Systems   Constitutional: Negative for fever. Musculoskeletal: Negative for arthralgias and joint swelling. Skin: Positive for wound. Negative for color change. Neurological: Negative for weakness and numbness. Except as noted above the remainder of the review of systems was reviewed and negative.        PAST MEDICAL HISTORY     Past Medical History: Diagnosis Date    ADHD (attention deficit hyperactivity disorder)     Cyst     testicles    Depression     Drug abuse, IV ([address]     heroine    Hemorrhoids     Hepatitis C     Heroin use          SURGICALHISTORY       Past Surgical History:   Procedure Laterality Date    NASAL SEPTUM SURGERY      TONSILLECTOMY      TYMPANOSTOMY TUBE PLACEMENT           CURRENT MEDICATIONS       Discharge Medication List as of 7/5/2022 10:09 PM      CONTINUE these medications which have NOT CHANGED    Details   ondansetron (ZOFRAN ODT) 4 MG disintegrating tablet Take 1 tablet by mouth every 8 hours as needed for Nausea, Disp-20 tablet, R-0Normal                  Clindamycin, Clindamycin/lincomycin, and Azithromycin    FAMILY HISTORY       Family History   Problem Relation Age of Onset    Diabetes Mother     Depression Mother     Substance Abuse Mother     High Blood Pressure Maternal Grandmother     Mental Illness Father     Substance Abuse Brother           SOCIAL HISTORY       Social History     Socioeconomic History    Marital status: Single     Spouse name: Not on file    Number of children: Not on file    Years of education: Not on file    Highest education level: Not on file   Occupational History    Occupation: unemployed   Tobacco Use    Smoking status: Current Every Day Smoker     Packs/day: 0.50     Years: 6.00     Pack years: 3.00     Types: Cigarettes    Smokeless tobacco: Current User     Types: Chew   Vaping Use    Vaping Use: Never used   Substance and Sexual Activity    Alcohol use: Not Currently     Comment: social    Drug use: Yes     Frequency: 7.0 times per week     Types: Marijuana (Weed), IV, Opiates      Comment: fenanyl IV    Sexual activity: Yes   Other Topics Concern    Not on file   Social History Narrative    Not on file     Social Determinants of Health     Financial Resource Strain:     Difficulty of Paying Living Expenses: Not on file   Food Insecurity:     Worried About normal.      Breath sounds: Normal breath sounds. No wheezing or rales. Abdominal:      General: There is no distension. Palpations: Abdomen is soft. Tenderness: There is no abdominal tenderness. Musculoskeletal:         General: No deformity. Normal range of motion. Cervical back: Normal range of motion. Skin:     General: Skin is warm and dry. Capillary Refill: Capillary refill takes less than 2 seconds. Findings: Erythema and lesion present. Comments: Multiple lesions mostly to the dorsum of the patient's hands in various states of healing. Similar respirations. Some areas are scarred over. Neurological:      Mental Status: He is alert and oriented to person, place, and time. RESULTS     EKG: All EKG's are interpreted by the Emergency Department Physician who either signs or Co-signsthis chart in the absence of a cardiologist.        RADIOLOGY:   Oksana Lust such as CT, Ultrasound and MRI are read by the radiologist. Plain radiographic images are visualized and preliminarily interpreted by the emergency physician with the below findings:        Interpretation per the Radiologist below, if available at the time ofthis note:    No orders to display         ED BEDSIDE ULTRASOUND:   Performed by ED Physician - none    LABS:  Labs Reviewed - No data to display    All other labs were within normal range or not returned as of this dictation. EMERGENCY DEPARTMENT COURSE and DIFFERENTIAL DIAGNOSIS/MDM:   Vitals:    Vitals:    07/05/22 2141   BP: 104/70   Pulse: 52   Resp: 18   Temp: 97.7 °F (36.5 °C)   TempSrc: Oral   SpO2: 98%   Weight: 132 lb 0.9 oz (59.9 kg)   Height: 5' 10\" (1.778 m)       Patient was given thefollowing medications:  Medications   doxycycline hyclate (VIBRA-TABS) tablet 100 mg (100 mg Oral Given 7/5/22 2159)       ED COURSE & MEDICAL DECISION MAKING    Pertinent Labs & Imaging studies reviewed.  (See chart for details)   -  Patient seen and assistance with substance abuse issues  Schedule an appointment as soon as possible for a visit       [address] [place] [address] [place]  Call [phone]  Call   As needed      DISCHARGEMEDICATIONS:  Discharge Medication List as of 7/5/2022 10:09 PM      START taking these medications    Details   amoxicillin-clavulanate (AUGMENTIN) 875-125 MG per tablet Take 1 tablet by mouth 2 times daily for 10 days, Disp-20 tablet, R-0Normal      doxycycline hyclate (VIBRA-TABS) 100 MG tablet Take 1 tablet by mouth 2 times daily for 7 days, Disp-14 tablet, R-0Normal                (Please note that portions of this note were completed with a voice recognition program.  Efforts were made to edit the dictations but occasionally words are mis-transcribed.)    Vida Joaquin MD (electronically signed)  Attending Emergency Physician          Vida Joaquin MD  07/06/22 2646

## 2022-07-12 NOTE — ED NOTES
Rx at [address] [place] [address],[address] states  They have Rx ready and available.   He states they told him they did not have them     Kimberley Ovalle RN  07/12/22 3894

## 2022-09-15 ENCOUNTER — HOSPITAL ENCOUNTER (EMERGENCY)
Age: 27
Discharge: HOME | End: 2022-09-15
Attending: EMERGENCY MEDICINE
Payer: MEDICAID

## 2022-09-15 ENCOUNTER — APPOINTMENT (OUTPATIENT)
Dept: GENERAL RADIOLOGY | Age: 27
End: 2022-09-15
Payer: MEDICAID

## 2022-09-15 VITALS
DIASTOLIC BLOOD PRESSURE: 67 MMHG | SYSTOLIC BLOOD PRESSURE: 104 MMHG | WEIGHT: 134.04 LBS | BODY MASS INDEX: 18.77 KG/M2 | HEIGHT: 71 IN | OXYGEN SATURATION: 98 % | RESPIRATION RATE: 12 BRPM | TEMPERATURE: 98.3 F | HEART RATE: 43 BPM

## 2022-09-15 DIAGNOSIS — L08.9 SOFT TISSUE INFECTION: Primary | ICD-10-CM

## 2022-09-15 DIAGNOSIS — R00.1 BRADYCARDIA: ICD-10-CM

## 2022-09-15 DIAGNOSIS — F19.10 IV DRUG ABUSE (HCC): ICD-10-CM

## 2022-09-15 PROCEDURE — 71046 X-RAY EXAM CHEST 2 VIEWS: CPT

## 2022-09-15 PROCEDURE — 99284 EMERGENCY DEPT VISIT MOD MDM: CPT

## 2022-09-15 PROCEDURE — 6370000000 HC RX 637 (ALT 250 FOR IP): Performed by: EMERGENCY MEDICINE

## 2022-09-15 RX ORDER — SULFAMETHOXAZOLE AND TRIMETHOPRIM 800; 160 MG/1; MG/1
1 TABLET ORAL ONCE
Status: COMPLETED | OUTPATIENT
Start: 2022-09-15 | End: 2022-09-15

## 2022-09-15 RX ORDER — AMOXICILLIN AND CLAVULANATE POTASSIUM 875; 125 MG/1; MG/1
1 TABLET, FILM COATED ORAL ONCE
Status: COMPLETED | OUTPATIENT
Start: 2022-09-15 | End: 2022-09-15

## 2022-09-15 RX ORDER — SULFAMETHOXAZOLE AND TRIMETHOPRIM 800; 160 MG/1; MG/1
1 TABLET ORAL 2 TIMES DAILY
Qty: 20 TABLET | Refills: 0 | Status: SHIPPED | OUTPATIENT
Start: 2022-09-15 | End: 2022-09-25

## 2022-09-15 RX ORDER — AMOXICILLIN AND CLAVULANATE POTASSIUM 875; 125 MG/1; MG/1
1 TABLET, FILM COATED ORAL 2 TIMES DAILY
Qty: 20 TABLET | Refills: 0 | Status: SHIPPED | OUTPATIENT
Start: 2022-09-15 | End: 2022-09-25

## 2022-09-15 RX ADMIN — AMOXICILLIN AND CLAVULANATE POTASSIUM 1 TABLET: 875; 125 TABLET, FILM COATED ORAL at 12:39

## 2022-09-15 RX ADMIN — SULFAMETHOXAZOLE AND TRIMETHOPRIM 1 TABLET: 800; 160 TABLET ORAL at 12:39

## 2022-09-15 ASSESSMENT — ENCOUNTER SYMPTOMS
STRIDOR: 0
BLOOD IN STOOL: 0
TROUBLE SWALLOWING: 0
WHEEZING: 0
BACK PAIN: 0
PHOTOPHOBIA: 0
VOICE CHANGE: 0
ABDOMINAL PAIN: 0
FACIAL SWELLING: 0
NAUSEA: 0
SHORTNESS OF BREATH: 0
VOMITING: 0

## 2022-09-15 NOTE — ED NOTES
Patient ambulatory from ED. AVS provided and discussed with patient. All questions answered. Patient verbalizes understanding of follow-up instructions. Respirations even and easy. No obvious distress at this time. Patient has discussed risks of leaving AMA with this RN and with MD and verbalizes understanding and again verbalizes desire to leave AMA at this time. Patient is alert and oriented. Patient notified that leaving AMA at this time does not prevent him from returning to this or any other ER at anytime if he changes his mind. Patient verbalizes understanding.        Preston Lujan RN  09/15/22 3878

## 2022-09-15 NOTE — ED NOTES
Patient presents to ED complaining of multiple open wounds to bilateral upper extremities. Patient reports hx of IV fentanyl use and states he last used around 0600 this AM. Patient drowsy and mildly hypotensive on arrival. Patient awakens easily to voice and is oriented x4. Patient resting on bed, respirations even and easy at this time. No obvious distress.        Jonathon Foster RN  09/15/22 1823

## 2022-09-15 NOTE — ED NOTES
Patient's wounds cleansed with surgical soap and dressed per MD order prior to discharge. CMS verified post placement. Capillary Refill time is less than 2 seconds. Patient verbalizes comfort of placement at this time.        Preston Lujan RN  09/15/22 2057

## 2022-09-15 NOTE — ED PROVIDER NOTES
[address] [facility]  eMERGENCY dEPARTMENT eNCOUnter      Pt Name: Wanda Lee  MRN: [id number]  [place] 1995  Date of evaluation: 9/15/2022  Provider: Dominique Antony MD    CHIEF COMPLAINT       Chief Complaint   Patient presents with    Wound Check     Patient presents to ED complaining of multiple open wounds to bilateral upper extremities. Patient reports hx of IV fentanyl use and states he last used around 0600 this AM. Patient drowsy and mildly hypotensive on arrival.        HISTORY OF PRESENT ILLNESS   (Location/Symptom, Timing/Onset, Context/Setting, Quality, Duration, Modifying Factors, Severity)  Note limiting factors. Mack Sparks is a 28 y.o. male with hx of IV drug abuse and hepatitis C who presents due to 2 weeks of sores primarily to left forearm but also to right distal hand. Patient reports he has used IV drugs at the site of his left arm sores. Denies any fever but does report fatigue. He reports he last used fentanyl around 6 AM this morning. Patient denies any confusion vomiting numbness or weakness. Reports his symptoms are moderate to severe constant and worsening    HPI    Nursing Notes were reviewed. REVIEW OFSYSTEMS    (2-9 systems for level 4, 10 or more for level 5)     Review of Systems   Constitutional:  Positive for fatigue. Negative for appetite change, fever and unexpected weight change. HENT:  Negative for facial swelling, trouble swallowing and voice change. Eyes:  Negative for photophobia and visual disturbance. Respiratory:  Negative for shortness of breath, wheezing and stridor. Cardiovascular:  Negative for chest pain and palpitations. Gastrointestinal:  Negative for abdominal pain, blood in stool, nausea and vomiting. Genitourinary:  Negative for difficulty urinating and dysuria. Musculoskeletal:  Negative for back pain, gait problem and neck pain. Skin:  Positive for wound.    Neurological:  Negative for seizures, syncope and speech difficulty. Psychiatric/Behavioral:  Negative for self-injury and suicidal ideas. Except as noted above the remainder of the review of systems was reviewed and negative.        PAST MEDICAL HISTORY     Past Medical History:   Diagnosis Date    ADHD (attention deficit hyperactivity disorder)     Cyst     testicles    Depression     Drug abuse, IV ([address]     heroine    Hemorrhoids     Hepatitis C     Heroin use          SURGICAL HISTORY       Past Surgical History:   Procedure Laterality Date    NASAL SEPTUM SURGERY      TONSILLECTOMY      TYMPANOSTOMY TUBE PLACEMENT           CURRENT MEDICATIONS       Previous Medications    ONDANSETRON (ZOFRAN ODT) 4 MG DISINTEGRATING TABLET    Take 1 tablet by mouth every 8 hours as needed for Nausea       ALLERGIES     Clindamycin, Clindamycin/lincomycin, and Azithromycin    FAMILY HISTORY       Family History   Problem Relation Age of Onset    Diabetes Mother     Depression Mother     Substance Abuse Mother     High Blood Pressure Maternal Grandmother     Mental Illness Father     Substance Abuse Brother           SOCIAL HISTORY       Social History     Socioeconomic History    Marital status: Single     Spouse name: None    Number of children: None    Years of education: None    Highest education level: None   Occupational History    Occupation: unemployed   Tobacco Use    Smoking status: Every Day     Packs/day: 0.50     Years: 6.00     Pack years: 3.00     Types: Cigarettes    Smokeless tobacco: Current     Types: Chew   Vaping Use    Vaping Use: Never used   Substance and Sexual Activity    Alcohol use: Not Currently     Comment: social    Drug use: Yes     Frequency: 7.0 times per week     Types: Marijuana (Weed), IV, Opiates      Comment: fenanyl IV    Sexual activity: Yes         PHYSICAL EXAM    (up to 7 for level 4, 8 or more for level 5)     ED Triage Vitals [09/15/22 1117]   BP Temp Temp Source Heart Rate Resp SpO2 Height Weight   (!) 93/48 98.3 °F (36.8 °C) Oral 51 15 99 % 5' 10.5\" (1.791 m) 134 lb 0.6 oz (60.8 kg)       Physical Exam  Vitals and nursing note reviewed. Constitutional:       General: He is not in acute distress. Appearance: He is well-developed. HENT:      Head: Normocephalic and atraumatic. Right Ear: External ear normal.      Left Ear: External ear normal.   Eyes:      Conjunctiva/sclera: Conjunctivae normal.   Neck:      Vascular: No JVD. Trachea: No tracheal deviation. Cardiovascular:      Rate and Rhythm: Regular rhythm. Bradycardia present. Pulses: Normal pulses. Comments: 2+ radial and ulnar pulses to bilateral upper extremities. Pulmonary:      Effort: Pulmonary effort is normal. No respiratory distress. Breath sounds: Normal breath sounds. No wheezing. Abdominal:      General: There is no distension. Palpations: Abdomen is soft. Tenderness: There is no abdominal tenderness. There is no guarding or rebound. Musculoskeletal:         General: Swelling and tenderness present. Cervical back: Neck supple. Comments: Skin deficits and erythematous infection to left forearm as well as distal right dorsal hand. Skin:     General: Skin is warm and dry. Neurological:      Mental Status: He is alert. Cranial Nerves: No cranial nerve deficit. Comments: Patient awake and oriented to person place time and situation. Ambulatory on own power       DIAGNOSTIC RESULTS     RADIOLOGY:     Interpretation per the Radiologist below, if available at the time of this note:    XR CHEST (2 VW)   Final Result   No radiographic evidence of an acute cardiopulmonary process. LABS:  Labs Reviewed   CULTURE, BLOOD 2   CULTURE, BLOOD 1   CBC WITH AUTO DIFFERENTIAL   BASIC METABOLIC PANEL W/ REFLEX TO MG FOR LOW K   LACTIC ACID   URINALYSIS WITH REFLEX TO CULTURE       All otherlabs were within normal range or not returned as of this dictation.     EMERGENCY DEPARTMENT COURSE and DIFFERENTIAL DIAGNOSIS/MDM:   Vitals:    Vitals:    09/15/22 1117 09/15/22 1145 09/15/22 1200 09/15/22 1215   BP: (!) 93/48 (!) 90/54 100/61 95/60   Pulse: 51 (!) 45 50 (!) 45   Resp: 15 12 10 13   Temp: 98.3 °F (36.8 °C)      TempSrc: Oral      SpO2: 99% 100% 100% 100%   Weight: 134 lb 0.6 oz (60.8 kg)      Height: 5' 10.5\" (1.791 m)            MDM  Patient has borderline hypotension on arrival however maps are above 72 and patient is perfusing brain as he is oriented to person place time and situation able to have discussions about his symptoms. Suspect likely sepsis and possible necrotizing soft tissue infection. Have recommended septic work-up, IV fluids, IV antibiotics, and admission. Patient refuses any Narcan, IVs, IV antibiotics but simply wants p.o. antibiotics. He reports that he has a situation with his job and he is to speak to his boss before he will be admitted to the hospital.  He reports he does plan to return later today. Patient is aware that this is very dangerous and that he is risking death or permanent disability from this. While the patient did use fentanyl earlier today and has borderline hypotension he is alert and is able to discuss the risk, benefits, alternatives of his decision. I spent significant time trying to persuade the patient to stay however he refuses and simply requests oral antibiotics therefore I have given the patient orthotics with the emergency room and prescribed to him in an effort to best treat him. The patient as decided to leave against medical advice. The patient is awake and alert, non-intoxicated, non-suicidal, nonpsychotic. There is no medical condition that prevents or inhibits their understanding of the risks/benefits of their decision. The patient understands the risks and benefits of their decision and has been given the opportunity to ask questions about their medical condition. Procedures    FINAL IMPRESSION      1.  Soft tissue infection 2. Bradycardia    3. IV drug abuse [facility])          DISPOSITION/PLAN   DISPOSITION [place] 09/15/2022 12:20:47 PM      PATIENT REFERRED TO:  Tricia Pino  [phone]  In 1 day  Ask for an appointment with a primary care doctor    [place] [address]  [phone]  Today        MEDICATIONS:  New Prescriptions    AMOXICILLIN-CLAVULANATE (AUGMENTIN) 875-125 MG PER TABLET    Take 1 tablet by mouth 2 times daily for 10 days    SULFAMETHOXAZOLE-TRIMETHOPRIM (BACTRIM DS) 800-160 MG PER TABLET    Take 1 tablet by mouth 2 times daily for 10 days          (Please note that portions of this note were completed with a voice recognition program.  Efforts were made to edit the dictations but occasionally words aremis-transcribed. )    Ama Leon MD (electronically signed)  Attending Emergency Physician           Ama Leon MD  09/15/22 [phone]

## 2022-09-15 NOTE — ED NOTES
This RN to bedside to discuss AMA with patient, patient reminded of risks of leaving AMA. Educated on severity of low heart rate, low blood pressure and potential for worsening infection up to and including potential for death. Patient verbalizes understanding of risks and again verbalizes desire to decline IV placement or admission at this time. Patient states he needs to speak with his boss and \"will probably be signing back in eventually. \" Patient is alert and oriented at the time of discussion.      Astrid Anderson RN  09/15/22 6179

## 2022-09-15 NOTE — ED NOTES
Patient provided with 1L of PO fluids at this time with MD permission.      Tania Joseph RN  09/15/22 7402

## 2022-09-15 NOTE — DISCHARGE INSTRUCTIONS
Please take your antibiotics as prescribed. Please come back to the emergency department as soon as you are able to.

## 2022-11-10 ENCOUNTER — HOSPITAL ENCOUNTER (INPATIENT)
Age: 27
LOS: 3 days | Discharge: HOME | DRG: 383 | End: 2022-11-14
Attending: STUDENT IN AN ORGANIZED HEALTH CARE EDUCATION/TRAINING PROGRAM | Admitting: INTERNAL MEDICINE
Payer: MEDICAID

## 2022-11-10 DIAGNOSIS — L08.9 INFECTED ULCER OF SKIN, WITH FAT LAYER EXPOSED (HCC): Primary | ICD-10-CM

## 2022-11-10 DIAGNOSIS — F19.90 IVDU (INTRAVENOUS DRUG USER): ICD-10-CM

## 2022-11-10 DIAGNOSIS — L98.492 INFECTED ULCER OF SKIN, WITH FAT LAYER EXPOSED (HCC): Primary | ICD-10-CM

## 2022-11-10 DIAGNOSIS — L03.114 LEFT ARM CELLULITIS: ICD-10-CM

## 2022-11-10 PROCEDURE — 99285 EMERGENCY DEPT VISIT HI MDM: CPT

## 2022-11-11 ENCOUNTER — APPOINTMENT (OUTPATIENT)
Dept: CT IMAGING | Age: 27
DRG: 383 | End: 2022-11-11
Payer: MEDICAID

## 2022-11-11 PROBLEM — T07.XXXA MULTIPLE OPEN WOUNDS: Status: ACTIVE | Noted: 2022-11-11

## 2022-11-11 PROBLEM — L98.492 INFECTED ULCER OF SKIN, WITH FAT LAYER EXPOSED (HCC): Status: ACTIVE | Noted: 2022-11-11

## 2022-11-11 PROBLEM — I95.9 HYPOTENSION: Status: ACTIVE | Noted: 2022-11-11

## 2022-11-11 PROBLEM — L03.114 LEFT ARM CELLULITIS: Status: ACTIVE | Noted: 2022-11-11

## 2022-11-11 PROBLEM — F19.90 IVDU (INTRAVENOUS DRUG USER): Status: ACTIVE | Noted: 2022-11-11

## 2022-11-11 PROBLEM — B18.2 HEP C W/O COMA, CHRONIC (HCC): Status: ACTIVE | Noted: 2022-11-11

## 2022-11-11 PROBLEM — F19.10 DRUG ABUSE, IV (HCC): Status: ACTIVE | Noted: 2022-11-11

## 2022-11-11 PROBLEM — Z11.4 SCREENING FOR HIV (HUMAN IMMUNODEFICIENCY VIRUS): Status: ACTIVE | Noted: 2022-11-11

## 2022-11-11 PROBLEM — L08.9 INFECTED ULCER OF SKIN, WITH FAT LAYER EXPOSED (HCC): Status: ACTIVE | Noted: 2022-11-11

## 2022-11-11 LAB
A/G RATIO: 1.4 (ref 1.1–2.2)
ALBUMIN SERPL-MCNC: 3.9 G/DL (ref 3.4–5)
ALP BLD-CCNC: 99 U/L (ref 40–129)
ALT SERPL-CCNC: 11 U/L (ref 10–40)
ANION GAP SERPL CALCULATED.3IONS-SCNC: 9 MMOL/L (ref 3–16)
AST SERPL-CCNC: 16 U/L (ref 15–37)
BASOPHILS ABSOLUTE: 0 K/UL (ref 0–0.2)
BASOPHILS RELATIVE PERCENT: 0.4 %
BILIRUB SERPL-MCNC: <0.2 MG/DL (ref 0–1)
BUN BLDV-MCNC: 15 MG/DL (ref 7–20)
CALCIUM SERPL-MCNC: 9.1 MG/DL (ref 8.3–10.6)
CHLORIDE BLD-SCNC: 102 MMOL/L (ref 99–110)
CO2: 28 MMOL/L (ref 21–32)
CREAT SERPL-MCNC: 0.9 MG/DL (ref 0.9–1.3)
EOSINOPHILS ABSOLUTE: 0.1 K/UL (ref 0–0.6)
EOSINOPHILS RELATIVE PERCENT: 2.3 %
GFR SERPL CREATININE-BSD FRML MDRD: >60 ML/MIN/{1.73_M2}
GLUCOSE BLD-MCNC: 94 MG/DL (ref 70–99)
HCT VFR BLD CALC: 31.5 % (ref 40.5–52.5)
HEMOGLOBIN: 10.8 G/DL (ref 13.5–17.5)
LYMPHOCYTES ABSOLUTE: 1.5 K/UL (ref 1–5.1)
LYMPHOCYTES RELATIVE PERCENT: 27.5 %
MCH RBC QN AUTO: 27 PG (ref 26–34)
MCHC RBC AUTO-ENTMCNC: 34.4 G/DL (ref 31–36)
MCV RBC AUTO: 78.6 FL (ref 80–100)
MONOCYTES ABSOLUTE: 0.8 K/UL (ref 0–1.3)
MONOCYTES RELATIVE PERCENT: 14.1 %
NEUTROPHILS ABSOLUTE: 3 K/UL (ref 1.7–7.7)
NEUTROPHILS RELATIVE PERCENT: 55.7 %
PDW BLD-RTO: 13.7 % (ref 12.4–15.4)
PLATELET # BLD: 176 K/UL (ref 135–450)
PMV BLD AUTO: 7.3 FL (ref 5–10.5)
POTASSIUM REFLEX MAGNESIUM: 3.9 MMOL/L (ref 3.5–5.1)
RBC # BLD: 4.01 M/UL (ref 4.2–5.9)
SODIUM BLD-SCNC: 139 MMOL/L (ref 136–145)
TOTAL PROTEIN: 6.7 G/DL (ref 6.4–8.2)
WBC # BLD: 5.4 K/UL (ref 4–11)

## 2022-11-11 PROCEDURE — 2580000003 HC RX 258: Performed by: INTERNAL MEDICINE

## 2022-11-11 PROCEDURE — 87077 CULTURE AEROBIC IDENTIFY: CPT

## 2022-11-11 PROCEDURE — 85025 COMPLETE CBC W/AUTO DIFF WBC: CPT

## 2022-11-11 PROCEDURE — 87205 SMEAR GRAM STAIN: CPT

## 2022-11-11 PROCEDURE — 6360000002 HC RX W HCPCS: Performed by: INTERNAL MEDICINE

## 2022-11-11 PROCEDURE — 1200000000 HC SEMI PRIVATE

## 2022-11-11 PROCEDURE — 86403 PARTICLE AGGLUT ANTBDY SCRN: CPT

## 2022-11-11 PROCEDURE — 6370000000 HC RX 637 (ALT 250 FOR IP): Performed by: INTERNAL MEDICINE

## 2022-11-11 PROCEDURE — 94760 N-INVAS EAR/PLS OXIMETRY 1: CPT

## 2022-11-11 PROCEDURE — 36415 COLL VENOUS BLD VENIPUNCTURE: CPT

## 2022-11-11 PROCEDURE — 87070 CULTURE OTHR SPECIMN AEROBIC: CPT

## 2022-11-11 PROCEDURE — 2580000003 HC RX 258: Performed by: STUDENT IN AN ORGANIZED HEALTH CARE EDUCATION/TRAINING PROGRAM

## 2022-11-11 PROCEDURE — 80053 COMPREHEN METABOLIC PANEL: CPT

## 2022-11-11 PROCEDURE — 87075 CULTR BACTERIA EXCEPT BLOOD: CPT

## 2022-11-11 PROCEDURE — 73202 CT UPPR EXTREMITY W/O&W/DYE: CPT

## 2022-11-11 PROCEDURE — 87040 BLOOD CULTURE FOR BACTERIA: CPT

## 2022-11-11 PROCEDURE — 6360000002 HC RX W HCPCS: Performed by: STUDENT IN AN ORGANIZED HEALTH CARE EDUCATION/TRAINING PROGRAM

## 2022-11-11 PROCEDURE — 87186 SC STD MICRODIL/AGAR DIL: CPT

## 2022-11-11 PROCEDURE — 6360000004 HC RX CONTRAST MEDICATION: Performed by: ORTHOPAEDIC SURGERY

## 2022-11-11 PROCEDURE — 96367 TX/PROPH/DG ADDL SEQ IV INF: CPT

## 2022-11-11 PROCEDURE — 96365 THER/PROPH/DIAG IV INF INIT: CPT

## 2022-11-11 RX ORDER — SODIUM CHLORIDE 0.9 % (FLUSH) 0.9 %
10 SYRINGE (ML) INJECTION EVERY 12 HOURS SCHEDULED
Status: DISCONTINUED | OUTPATIENT
Start: 2022-11-11 | End: 2022-11-14 | Stop reason: HOSPADM

## 2022-11-11 RX ORDER — METHADONE HYDROCHLORIDE 10 MG/ML
100 CONCENTRATE ORAL DAILY
COMMUNITY

## 2022-11-11 RX ORDER — KETOROLAC TROMETHAMINE 30 MG/ML
30 INJECTION, SOLUTION INTRAMUSCULAR; INTRAVENOUS ONCE
Status: COMPLETED | OUTPATIENT
Start: 2022-11-11 | End: 2022-11-11

## 2022-11-11 RX ORDER — ACETAMINOPHEN 650 MG/1
650 SUPPOSITORY RECTAL EVERY 4 HOURS PRN
Status: DISCONTINUED | OUTPATIENT
Start: 2022-11-11 | End: 2022-11-14 | Stop reason: HOSPADM

## 2022-11-11 RX ORDER — ACETAMINOPHEN 325 MG/1
650 TABLET ORAL EVERY 4 HOURS PRN
Status: DISCONTINUED | OUTPATIENT
Start: 2022-11-11 | End: 2022-11-14 | Stop reason: HOSPADM

## 2022-11-11 RX ORDER — ONDANSETRON 2 MG/ML
4 INJECTION INTRAMUSCULAR; INTRAVENOUS EVERY 4 HOURS PRN
Status: DISCONTINUED | OUTPATIENT
Start: 2022-11-11 | End: 2022-11-14 | Stop reason: HOSPADM

## 2022-11-11 RX ORDER — SODIUM CHLORIDE 9 MG/ML
INJECTION, SOLUTION INTRAVENOUS PRN
Status: DISCONTINUED | OUTPATIENT
Start: 2022-11-11 | End: 2022-11-14 | Stop reason: HOSPADM

## 2022-11-11 RX ORDER — METHADONE HYDROCHLORIDE 10 MG/1
100 TABLET ORAL DAILY
Status: DISCONTINUED | OUTPATIENT
Start: 2022-11-11 | End: 2022-11-14 | Stop reason: HOSPADM

## 2022-11-11 RX ORDER — SODIUM CHLORIDE 0.9 % (FLUSH) 0.9 %
10 SYRINGE (ML) INJECTION PRN
Status: DISCONTINUED | OUTPATIENT
Start: 2022-11-11 | End: 2022-11-14 | Stop reason: HOSPADM

## 2022-11-11 RX ORDER — SODIUM CHLORIDE 9 MG/ML
INJECTION, SOLUTION INTRAVENOUS CONTINUOUS
Status: DISCONTINUED | OUTPATIENT
Start: 2022-11-11 | End: 2022-11-13

## 2022-11-11 RX ORDER — 0.9 % SODIUM CHLORIDE 0.9 %
1000 INTRAVENOUS SOLUTION INTRAVENOUS ONCE
Status: COMPLETED | OUTPATIENT
Start: 2022-11-11 | End: 2022-11-11

## 2022-11-11 RX ORDER — POLYETHYLENE GLYCOL 3350 17 G/17G
17 POWDER, FOR SOLUTION ORAL DAILY PRN
Status: DISCONTINUED | OUTPATIENT
Start: 2022-11-11 | End: 2022-11-14 | Stop reason: HOSPADM

## 2022-11-11 RX ADMIN — KETOROLAC TROMETHAMINE 30 MG: 30 INJECTION, SOLUTION INTRAMUSCULAR at 04:40

## 2022-11-11 RX ADMIN — SODIUM CHLORIDE, PRESERVATIVE FREE 10 ML: 5 INJECTION INTRAVENOUS at 21:00

## 2022-11-11 RX ADMIN — CEFEPIME 2000 MG: 2 INJECTION, POWDER, FOR SOLUTION INTRAVENOUS at 02:16

## 2022-11-11 RX ADMIN — IOPAMIDOL 75 ML: 755 INJECTION, SOLUTION INTRAVENOUS at 13:35

## 2022-11-11 RX ADMIN — METHADONE HYDROCHLORIDE 100 MG: 10 TABLET ORAL at 11:48

## 2022-11-11 RX ADMIN — VANCOMYCIN HYDROCHLORIDE 1250 MG: 1 INJECTION, POWDER, LYOPHILIZED, FOR SOLUTION INTRAVENOUS at 03:01

## 2022-11-11 RX ADMIN — VANCOMYCIN HYDROCHLORIDE 1000 MG: 1 INJECTION, POWDER, LYOPHILIZED, FOR SOLUTION INTRAVENOUS at 12:43

## 2022-11-11 RX ADMIN — SODIUM CHLORIDE: 9 INJECTION, SOLUTION INTRAVENOUS at 04:34

## 2022-11-11 RX ADMIN — CEFEPIME 2000 MG: 2 INJECTION, POWDER, FOR SOLUTION INTRAVENOUS at 19:05

## 2022-11-11 RX ADMIN — SODIUM CHLORIDE 1000 ML: 9 INJECTION, SOLUTION INTRAVENOUS at 08:03

## 2022-11-11 ASSESSMENT — ENCOUNTER SYMPTOMS
COUGH: 0
DIARRHEA: 0
EYE PAIN: 0
VOMITING: 0
SORE THROAT: 0
ABDOMINAL PAIN: 0
SHORTNESS OF BREATH: 0
NAUSEA: 0
COLOR CHANGE: 1
BACK PAIN: 0

## 2022-11-11 ASSESSMENT — PAIN - FUNCTIONAL ASSESSMENT: PAIN_FUNCTIONAL_ASSESSMENT: ACTIVITIES ARE NOT PREVENTED

## 2022-11-11 ASSESSMENT — LIFESTYLE VARIABLES
HOW MANY STANDARD DRINKS CONTAINING ALCOHOL DO YOU HAVE ON A TYPICAL DAY: PATIENT DOES NOT DRINK
HOW OFTEN DO YOU HAVE A DRINK CONTAINING ALCOHOL: NEVER

## 2022-11-11 ASSESSMENT — PAIN DESCRIPTION - LOCATION: LOCATION: ARM

## 2022-11-11 ASSESSMENT — PAIN DESCRIPTION - ORIENTATION: ORIENTATION: LEFT

## 2022-11-11 ASSESSMENT — PAIN DESCRIPTION - DESCRIPTORS: DESCRIPTORS: ACHING

## 2022-11-11 ASSESSMENT — PAIN SCALES - GENERAL: PAINLEVEL_OUTOF10: 4

## 2022-11-11 NOTE — PROGRESS NOTES
Pt alert and oriented x4 resting in bed with eyes closed. Blood pressure noted to be low. IV Normal saline bolus started at this time and infusing without complication. Pt able to make needs known and asking to rest. Bed locked and in lowest position, alarm on, side rails up x2. Call light and all belongings within reach. Will continue to check on pt. every 2 hours to monitor pt's safety and assess pt needs.  Electronically signed by George Solano RN on 11/11/2022 at 8:06 AM
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
## 2022-11-11 NOTE — PLAN OF CARE
Problem: Discharge Planning  Goal: Discharge to home or other facility with appropriate resources  Outcome: Progressing  Flowsheets  Taken 11/11/2022 0704  Discharge to home or other facility with appropriate resources:   Identify barriers to discharge with patient and caregiver   Arrange for needed discharge resources and transportation as appropriate   Identify discharge learning needs (meds, wound care, etc)   Arrange for interpreters to assist at discharge as needed   Refer to discharge planning if patient needs post-hospital services based on physician order or complex needs related to functional status, cognitive ability or social support system  Taken 11/11/2022 0452  Discharge to home or other facility with appropriate resources:   Identify barriers to discharge with patient and caregiver   Arrange for needed discharge resources and transportation as appropriate   Identify discharge learning needs (meds, wound care, etc)   Refer to discharge planning if patient needs post-hospital services based on physician order or complex needs related to functional status, cognitive ability or social support system   Arrange for interpreters to assist at discharge as needed

## 2022-11-11 NOTE — PLAN OF CARE
[facility] [address] Surgery  Plan of Care Note          Orthopedic Plan of Care Note     Nasrin Berumen 28 y.o. presented to ED for hand and forearm wounds. Uploaded photo imaging reviewed, and showed full thickness wounds to his right hand dorsum and left arm with skin loss. Plan:  - I recommend a plastics consult for I&D and wound coverage  - Will defer to their expertise on the need for imaging    Thank you very much for the kind consultation and allowing me to participate in this patient's care. I will continue to keep you apprised of his progress. Sincerely,    Roberto Urrutia MD [address] [facility] [address] [place] [address], [id number]  Email: [email or web address]  Office: [phone]    11/11/22  12:19 PM      Roberto Urrutia MD, MD 11/11/2022 12:18 PM

## 2022-11-11 NOTE — PROGRESS NOTES
4 Eyes Skin Assessment     NAME:  Mack Delacruz  YOB: 1995  MEDICAL RECORD NUMBER:  [id number]    The patient is being assess for  Admission    I agree that 2 RN's have performed a thorough Head to Toe Skin Assessment on the patient. ALL assessment sites listed below have been assessed. Areas assessed by both nurses:    Head, Face, Ears, Shoulders, Back, Chest, Arms, Elbows, Hands, and Sacrum. Buttock, Coccyx, Ischium        Does the Patient have a Wound? Yes wound(s) were present on assessment.  LDA wound assessment was Initiated and completed        Thad Prevention initiated:  No   Wound Care Orders initiated:  No    Pressure Injury (Stage 3,4, Unstageable, DTI, NWPT, and Complex wounds) if present place referral/consult order under [de-identified] No    New and Established Ostomies if present place consult order under : No      Nurse 1 eSignature: Electronically signed by Annika Saavedra RN on 11/11/22 at 5:44 AM EST    **SHARE this note so that the co-signing nurse is able to place an eSignature**    Nurse 2 eSignature: Electronically signed by Armida Deal RN on 11/13/22 at 6:47 PM EST

## 2022-11-11 NOTE — H&P
Hospital Medicine  History and Physical    PCP: No primary care provider on file. Patient Name: [place] Notice    Date of Service: Pt seen/examined on 11/11/22 and admitted to Inpatient with expected LOS greater than two midnights due to medical therapy. CHIEF COMPLAINT:  Pt c/o ulcerations and infections secondary to IVDA  HISTORY OF PRESENT ILLNESS: Pt is an 28y.o. year-old male with a history of IVDA who presents to the emergency room for evaluation of ulcerations and infections of his upper extremities secondary to shooting up. He reports that the affected areas have dull, burning pain. He has had chills. He was seen in the emergency room last week and left AMA so that he would not lose his job due to absence. Associated signs and symptoms do not include fever, nausea, vomiting, abdominal pain, hemoptysis, hematochezia, diarrhea or constipation. He is being admitted for further evaluation and treatment        Past Medical History:        Diagnosis Date    ADHD (attention deficit hyperactivity disorder)     Cyst     testicles    Depression     Drug abuse, IV ([facility] [address]     heroine    Hemorrhoids     Hepatitis C     Heroin use        Past Surgical History:        Procedure Laterality Date    NASAL SEPTUM SURGERY      TONSILLECTOMY      TYMPANOSTOMY TUBE PLACEMENT         Allergies:  Clindamycin, Clindamycin/lincomycin, and Azithromycin    Medications Prior to Admission:    Prior to Admission medications    Medication Sig Start Date End Date Taking? Authorizing Provider   ondansetron (ZOFRAN ODT) 4 MG disintegrating tablet Take 1 tablet by mouth every 8 hours as needed for Nausea 5/12/22   Katy Carey MD       Family History:       Problem Relation Age of Onset    Diabetes Mother     Depression Mother     Substance Abuse Mother     High Blood Pressure Maternal Grandmother     Mental Illness Father     Substance Abuse Brother      Social History:   TOBACCO:   reports that he has been smoking cigarettes.  He has a 3.00 pack-year smoking history. His smokeless tobacco use includes chew. ETOH:   reports that he does not currently use alcohol. OCCUPATION:      REVIEW OF SYSTEMS:  A full review of systems was performed and is negative except for that which appears in the HPI    Physical Exam:    Vitals: BP (!) 87/51 Comment: RN notified  Pulse 60   Temp 97.4 °F (36.3 °C) (Oral)   Resp 16   Ht 5' 6\" (1.676 m)   Wt 141 lb (64 kg)   SpO2 97%   BMI 22.76 kg/m²   General appearance: WD/WN 28y.o. year-old male who is alert, appears stated age and is cooperative  HEENT: Head: Normocephalic, no lesions, without obvious abnormality. Eye: Normal external eye, conjunctiva, lids cornea, PEERL. Ears: Normal external ears. Non-tender. Nose: Normal external nose, mucus membranes and septum. Pharynx: Dental Hygiene adequate. Normal buccal mucosa. Normal pharynx. Neck: no adenopathy, no carotid bruit, no JVD, supple, symmetrical, trachea midline and thyroid not enlarged, symmetric, no tenderness/mass/nodules  Lungs: clear to auscultation bilaterally and no use of accessory muscles  Heart: regular rate and rhythm, S1, S2 normal, no murmur, click, rub or gallop and normal apical impulse  Abdomen: soft, non-tender; bowel sounds normal; no masses, no organomegaly  Extremities: extremities atraumatic, no cyanosis or edema and Homans sign is negative, no sign of DVT. Capillary Refill: Acceptable < 3 seconds   Peripheral Pulses: +3 easily felt, not easily obliterated with pressures   Skin: Ulceration and cellulitis involving his left forearm and hand, and his right hand (see pictures in ER provider's notes)  Neurologic: Neurovascularly intact without any focal sensory/motor deficits. Cranial nerves: II-XII intact, grossly non-focal. Gait was not tested.   Mental Status: Alert and oriented, thought content appropriate, normal insight        CBC:   Recent Labs     11/11/22  0125   WBC 5.4   HGB 10.8*        BMP:    Recent Labs 11/11/22  0125      K 3.9      CO2 28   BUN 15   CREATININE 0.9   GLUCOSE 94     Troponin: No results for input(s): TROPONINI in the last 72 hours. PT/INR:  No results found for: PTINR  U/A:    Lab Results   Component Value Date/Time    LEUKOCYTESUR TRACE 10/11/2021 06:00 PM    RBCUA 0 10/11/2021 06:00 PM    SPECGRAV 1.014 10/11/2021 06:00 PM    UROBILINOGEN 4.0 10/11/2021 06:00 PM    BILIRUBINUR Negative 10/11/2021 06:00 PM    BLOODU Negative 10/11/2021 06:00 PM    GLUCOSEU Negative 10/11/2021 06:00 PM    PROTEINU Negative 10/11/2021 06:00 PM         Assessment:   Principal Problem:    Left arm cellulitis  Active Problems:    Hypotension    Drug abuse, IV (HCC)  Resolved Problems:    * No resolved hospital problems. *      Plan:       Ulceration and cellulitis involving his left forearm and hand, and right hand  - blood cultures have been ordered. Will start Cefepime and Vancomycin. ID has been consulted. Surgery has been consulted to evaluate for possible debridement. Hypotension - Will bolus IV fluids and monitor    Drug abuse, IV ([address] - advise against illicit drug use         Code Status: Full Code  Diet: ADULT DIET; Regular  DVT Prophylaxis: SCDs    (Advanced care planning has been discussed with patient and/or responsible family member and is reflected in the code status.  Further orders associated with this have been entered if appropriate)    Disposition: Anticipate that patient will remain in the hospital for 2 or more midnights depending on further evaluation and clinical course     Please note that over 50 minutes was spent in evaluating the patient, review of records and results, discussion with staff/family, etc.      Christiano Faulkner MD

## 2022-11-11 NOTE — CONSULTS
Infectious Diseases Inpatient Consult Note      Reason for Consult:  Multiple open wounds and cellulitis  on the upper  extremities from IVDA     Requesting Physician:         Primary Care Physician:  No primary care provider on file. History Obtained From:  [facility] and Patient      CHIEF COMPLAINT:     Chief Complaint   Patient presents with    Skin Ulcer     Generalized open wounds from shooting IV drugs. States they wanted him to be admitted when he was seen last week          HISTORY OF PRESENT ILLNESS:  28 y.o. man with a history of IV drug abuse, heroin abuse, hepatitis C, ADHD, depression, admitted to the hospital secondary to multiple open wounds on the upper extremity  from IV drug abuse. He has large ulceration on the lateral aspect of the Left fore arm  with ongoing drainage. Patient was in ED recently but has left  AMA. He is now admitted because of worsening wound infection with associated cellulitis. Labs indicate creatinine 0.9 WBC normal 5.4 hemoglobin 10.9 LFT normal.  He was initiated on IV antibiotic we are consulted for recommendations. Location : Bilateral upper extremity pain. Left arm  pain along the ulcer area     Quality : Aching, burning     Severity : 8/10    Duration :weeks      Timing : Constant  Context :   IVDA  Modifying factors : None  Associated signs and symptoms: Denies any fever chills        Past Medical History:    Past Medical History:   Diagnosis Date    ADHD (attention deficit hyperactivity disorder)     Cyst     testicles    Depression     Drug abuse, IV ([address]     heroine    Hemorrhoids     Hepatitis C     Heroin use        Past Surgical History:    Past Surgical History:   Procedure Laterality Date    NASAL SEPTUM SURGERY      TONSILLECTOMY      TYMPANOSTOMY TUBE PLACEMENT         Current Medications:    No outpatient medications have been marked as taking for the 11/10/22 encounter [facility] Encounter).        Allergies:  Clindamycin, Clindamycin/lincomycin, and Azithromycin    Immunizations :   Immunization History   Administered Date(s) Administered    COVID-19, J&J, (age 18y+), IM, 0.5 mL 04/02/2021    Tdap (Boostrix, Adacel) 08/27/2021         Social History:     Social History     Tobacco Use    Smoking status: Every Day     Packs/day: 0.50     Years: 6.00     Pack years: 3.00     Types: Cigarettes    Smokeless tobacco: Current     Types: Chew   Vaping Use    Vaping Use: Never used   Substance Use Topics    Alcohol use: Not Currently     Comment: social    Drug use: Yes     Frequency: 7.0 times per week     Types: Marijuana (Jonathan Paco), IV, Opiates      Comment: fenanyl IV     Social History     Tobacco Use   Smoking Status Every Day    Packs/day: 0.50    Years: 6.00    Pack years: 3.00    Types: Cigarettes   Smokeless Tobacco Current    Types: Chew      Family History   Problem Relation Age of Onset    Diabetes Mother     Depression Mother     Substance Abuse Mother     High Blood Pressure Maternal Grandmother     Mental Illness Father     Substance Abuse Brother          REVIEW OF SYSTEMS:     Constitutional:  negative for fevers, chills, night sweats  Eyes:  negative for blurred vision, eye discharge, visual disturbance   HEENT:  negative for hearing loss, ear drainage,nasal congestion  Respiratory:  negative for cough, shortness of breath or hemoptysis   Cardiovascular:  negative for chest pain, palpitations, syncope  Gastrointestinal:  negative for nausea, vomiting, diarrhea, constipation, abdominal pain  Genitourinary:  negative for frequency, dysuria, urinary incontinence, hematuria  Hematologic/Lymphatic:  negative for easy bruising, bleeding and lymphadenopathy  Allergic/Immunologic:  negative for recurrent infections, angioedema, anaphylaxis   Endocrine:  negative for weight changes, polyuria, polydipsia and polyphagia  Musculoskeletal:   multiple open wound on the hand and legs+   pain, swelling, decreased range of motion  Integumentary: No rashes, skin lesions  Neurological:  negative for headaches, slurred speech, unilateral weakness  Psychiatric: negative for hallucinations,confusion,agitation.      PHYSICAL EXAM:      Vitals:    BP (!) 111/58   Pulse 55   Temp 97.6 °F (36.4 °C) (Oral)   Resp 18   Ht 5' 6\" (1.676 m)   Wt 141 lb (64 kg)   SpO2 96%   BMI 22.76 kg/m²     General Appearance: alert,in no acute distress, +  pallor, no icterus  poor skin hygiene    Skin: warm and dry, no rash or erythema  Head: normocephalic and atraumatic  Eyes: pupils equal, round, and reactive to light, conjunctivae normal  ENT: tympanic membrane, external ear and ear canal normal bilaterally, nose without deformity, nasal mucosa and turbinates normal without polyps  Neck: supple and non-tender without mass, no thyromegaly  no cervical lymphadenopathy  Pulmonary/Chest: clear to auscultation bilaterally- no wheezes, rales or rhonchi, normal air movement, no respiratory distress  Cardiovascular: normal rate, regular rhythm, normal S1 and S2, no murmurs, rubs, clicks, or gallops, no carotid bruits  Abdomen: soft, non-tender, non-distended, normal bowel sounds, no masses or organomegaly  Extremities: no cyanosis, clubbing or edema  Musculoskeletal: normal range of motion, no joint swelling, deformity or tenderness  Integumentary: No rashes, no abnormal skin lesions, no petechiae  Neurologic: reflexes normal and symmetric, no cranial nerve deficit  Psych:  Orientation, sensorium, mood normal   Lines: IV    Rt hand dorsum scab+ and local cellulitis  Left hand and Left fore arm lateral open wound with scab and draiange_ and cellulitis +           DATA:    CBC:   Lab Results   Component Value Date    WBC 5.4 11/11/2022    HGB 10.8 (L) 11/11/2022    HCT 31.5 (L) 11/11/2022    MCV 78.6 (L) 11/11/2022     11/11/2022     RENAL:   Lab Results   Component Value Date    CREATININE 0.9 11/11/2022    BUN 15 11/11/2022     11/11/2022    K 3.9 11/11/2022     11/11/2022    CO2 28 11/11/2022     SED RATE:   Lab Results   Component Value Date/Time    SEDRATE 13 12/23/2021 02:01 PM     CK:   Lab Results   Component Value Date/Time    CKTOTAL 737 09/18/2021 04:57 AM     CRP:   Lab Results   Component Value Date/Time    CRP 5.5 04/10/2022 10:16 PM     Hepatic Function Panel:   Lab Results   Component Value Date/Time    ALKPHOS 99 11/11/2022 01:25 AM    ALT 11 11/11/2022 01:25 AM    AST 16 11/11/2022 01:25 AM    PROT 6.7 11/11/2022 01:25 AM    PROT 8.1 10/06/2012 01:20 PM    BILITOT <0.2 11/11/2022 01:25 AM    BILIDIR <0.2 04/10/2022 10:16 PM    IBILI see below 04/10/2022 10:16 PM    LABALBU 3.9 11/11/2022 01:25 AM     UA:  Lab Results   Component Value Date/Time    COLORU YELLOW 10/11/2021 06:00 PM    CLARITYU Clear 10/11/2021 06:00 PM    GLUCOSEU Negative 10/11/2021 06:00 PM    BILIRUBINUR Negative 10/11/2021 06:00 PM    KETUA Negative 10/11/2021 06:00 PM    SPECGRAV 1.014 10/11/2021 06:00 PM    BLOODU Negative 10/11/2021 06:00 PM    PHUR 8.5 10/11/2021 06:00 PM    PHUR 8.5 10/11/2021 06:00 PM    PROTEINU Negative 10/11/2021 06:00 PM    UROBILINOGEN 4.0 10/11/2021 06:00 PM    NITRU Negative 10/11/2021 06:00 PM    LEUKOCYTESUR TRACE 10/11/2021 06:00 PM    LABMICR YES 10/11/2021 06:00 PM    URINETYPE NotGiven 10/11/2021 06:00 PM      Urine Microscopic:   Lab Results   Component Value Date/Time    BACTERIA 4+ 10/11/2021 06:00 PM    HYALCAST 0 10/11/2021 06:00 PM    WBCUA 8 10/11/2021 06:00 PM    RBCUA 0 10/11/2021 06:00 PM    EPIU 0 10/11/2021 06:00 PM     Urine Reflex to Culture:   Lab Results   Component Value Date/Time    URRFLXCULT Not Indicated 10/11/2021 06:00 PM     Contains abnormal data Hepatitis Panel, Acute  Order: [id number]  Status: Final result    Visible to patient: No (not released)    Next appt: None    0 Result Notes    1 HM Topic  Component Ref Range & Units 5/24/15 1625    Hep A IgM Non-reactive Non-reactive    Hep B Core Ab, IgM Non-reactive Non-reactive    Hep B S Ag Interp Non-reactive Non-reactive    Hep C Ab Interp Non-reactive REACTIVE Abnormal     Resulting Agency  [address] [facility]           Narrative            MICRO: cultures reviewed and updated by me     Procedure Component Value Units Date/Time   Blood Culture 1 [[id number]] Collected: 11/11/22 0125   Order Status: Sent Specimen: Blood Updated: 11/11/22 0924   Culture, Blood 2 [[id number]]    Order Status: Sent Specimen: Blood      Blood Culture:   Lab Results   Component Value Date/Time    BC No Growth after 4 days of incubation. 04/10/2022 10:35 PM    BLOODCULT2 No Growth after 4 days of incubation. 12/23/2021 02:30 PM       Viral Culture:    Lab Results   Component Value Date/Time    COVID19 Not Detected 04/10/2022 11:23 PM     Urine Culture: No results for input(s): LABURIN in the last 72 hours. Scheduled Meds:   cefepime  2,000 mg IntraVENous Q12H    sodium chloride flush  10 mL IntraVENous 2 times per day       Continuous Infusions:   sodium chloride      sodium chloride 75 mL/hr at 11/11/22 0434       PRN Meds:  sodium chloride flush, sodium chloride, ondansetron, polyethylene glycol, acetaminophen **OR** acetaminophen    Imaging:   No orders to display       All pertinent images and reports for the current Hospitalization were reviewed by me.     IMPRESSION:    Patient Active Problem List   Diagnosis    Substance induced mood disorder (HCC)    Opiate addiction (HCC)    Encephalopathy    Acute electrocardiogram changes    Drug use    AMS (altered mental status)    Syncope    Syncope and collapse    Cellulitis    Left arm cellulitis    Hypotension    Drug abuse, IV (HCC)     Multiple open wound on both upper extremities  Left fore arm Lateral open wound with ulcer and cellulitis  Scabs from IVDA  Fentanyl abuse  Smoking+  ADHD  Hep C+  WBC normal  He has been leaving AMA       ON GOING open wounds from IVDA and possible skin popping upper extremities with large lateral wound on the Left Fore arm will need IV ABX and local care  for now     Do not feel there is any deep abscess on exam but has tissue loss from IVDA and needs meticulous wound care            Labs, Microbiology, Radiology and pertinent results from current hospitalization and care every where were reviewed by me as a part of the consultation. PLAN :  Cont IV Vancomycin  x 1 gm  Q 12 h  Cont IV Cefepime x 2 gm q 12 hr  Wound cx  Blood cx  MRSA probe  HIV screen  Has h/o Hep C for nearly 10 yrs per patient  Wound care  Will be able to choose oral abx at d/c     Discussed with patient/Family and Nursing   Risk of Complications/Morbidity: High      Illness(es)/ Infection present that pose threat to bodily function. There is potential for severe exacerbation of infection/side effects of treatment. Therapy requires intensive monitoring for antimicrobial agent toxicity. Thanks for allowing me to participate in your patient's care please call me with any questions or concerns.     Dr. Perla Small MD  [facility] Physician  Phone: [phone]   Fax : [phone]

## 2022-11-11 NOTE — ED PROVIDER NOTES
[facility] ENCOUNTER      Pt Name: Stefanie Jaime  MRN: [id number]  [place] 1995  Date of evaluation: 11/10/2022  Provider: Ely Rodriguez MD    CHIEF COMPLAINT       Chief Complaint   Patient presents with    Skin Ulcer     Generalized open wounds from shooting IV drugs. States they wanted him to be admitted when he was seen last week      Ulceration, redness, chills. HISTORY OF PRESENT ILLNESS   (Location/Symptom, Timing/Onset,Context/Setting, Quality, Duration, Modifying Factors, Severity)  Note limiting factors. Stefanie Jaime is a 28 y.o. male who presents to the ED with a chief complaint of skin ulceration and redness in multiple areas, most severely on the L forearm in the distribution of recent IV fentanyl use. Was evaluated last week for the same and left AMA at that time because he didn't want to lose his job. He states his job is in order now, his last use of fentanyl was 2 days ago, does not feel as though he is in withdrawal currently, amenable to admission for IV abx at this time. Has associated chills, pain is dull with burning and itching in the distribution of his ulcerations. Symptoms not otherwise alleviated or exacerbated by other factors. NursingNotes were reviewed. REVIEW OF SYSTEMS    (2-9 systems for level 4, 10 or more for level 5)     Review of Systems   Constitutional:  Positive for chills. Negative for fever. HENT:  Negative for congestion and sore throat. Eyes:  Negative for pain and visual disturbance. Respiratory:  Negative for cough and shortness of breath. Cardiovascular:  Negative for chest pain and palpitations. Gastrointestinal:  Negative for abdominal pain, diarrhea, nausea and vomiting. Genitourinary:  Negative for dysuria and frequency. Musculoskeletal:  Negative for back pain and neck pain. Skin:  Positive for color change and wound. Negative for rash.    Neurological:  Negative for dizziness, weakness and light-headedness.       PAST MEDICAL HISTORY     Past Medical History:   Diagnosis Date    ADHD (attention deficit hyperactivity disorder)     Cyst     testicles    Depression     Drug abuse, IV ([address]     heroine    Hemorrhoids     Hepatitis C     Heroin use          SURGICALHISTORY       Past Surgical History:   Procedure Laterality Date    NASAL SEPTUM SURGERY      TONSILLECTOMY      TYMPANOSTOMY TUBE PLACEMENT           CURRENT MEDICATIONS       Previous Medications    ONDANSETRON (ZOFRAN ODT) 4 MG DISINTEGRATING TABLET    Take 1 tablet by mouth every 8 hours as needed for Nausea       ALLERGIES     Clindamycin, Clindamycin/lincomycin, and Azithromycin    FAMILY HISTORY       Family History   Problem Relation Age of Onset    Diabetes Mother     Depression Mother     Substance Abuse Mother     High Blood Pressure Maternal Grandmother     Mental Illness Father     Substance Abuse Brother           SOCIAL HISTORY       Social History     Socioeconomic History    Marital status: Single     Spouse name: None    Number of children: None    Years of education: None    Highest education level: None   Occupational History    Occupation: unemployed   Tobacco Use    Smoking status: Every Day     Packs/day: 0.50     Years: 6.00     Pack years: 3.00     Types: Cigarettes    Smokeless tobacco: Current     Types: Chew   Vaping Use    Vaping Use: Never used   Substance and Sexual Activity    Alcohol use: Not Currently     Comment: social    Drug use: Yes     Frequency: 7.0 times per week     Types: Marijuana (Weed), IV, Opiates      Comment: fenanyl IV    Sexual activity: Yes       SCREENINGS    Op[place] Coma Scale  Eye Opening: Spontaneous  Best Verbal Response: Oriented  Best Motor Response: Obeys commands  [place] Coma Scale Score: 15        PHYSICAL EXAM    (up to 7 for level 4, 8 or more for level 5)     ED Triage Vitals [11/11/22 0025]   BP Temp Temp Source Heart Rate Resp SpO2 Height Weight   108/69 97.7 °F (36.5 °C) Oral 63 16 99 % -- 141 lb (64 kg)       General: Alert and oriented appropriately for age, No acute distress. Eye: Normal conjunctiva. Sclera anicteric. HENT: Oral mucosa is moist.  Respiratory: Respirations even and non-labored. CTAB. Cardiovascular: Normal rate, Regular rhythm. Intact peripheral pulses. Gastrointestinal: Soft, Non-tender, Non-distended. : deferred. Musculoskeletal: Ranges all joints without difficulty. Unstageable ulceration as below with involvement to at least the subcutaneous fat layer. Integumentary: necrotic ulceration of the L forearm, well demarcated ulceration of the L and right dorsal hands as well with dry granulation tissue and some overlying eschar. L dorsal forearm large ulceration with surrounding mildly ttp erythematous skin and palpable indurated venous cords. Neurologic: Alert and appropriate for age. No focal deficits. Psychiatric: Cooperative. DIAGNOSTIC RESULTS         LABS:  Labs Reviewed   CULTURE, BLOOD 1   CULTURE, BLOOD 2   CBC WITH AUTO DIFFERENTIAL   COMPREHENSIVE METABOLIC PANEL W/ REFLEX TO MG FOR LOW K       All other labs were within normal range or not returned as of this dictation. EMERGENCY DEPARTMENT COURSE and DIFFERENTIAL DIAGNOSIS/MDM:   Vitals:    Vitals:    22 0025   BP: 108/69   Pulse: 63   Resp: 16   Temp: 97.7 °F (36.5 °C)   TempSrc: Oral   SpO2: 99%   Weight: 141 lb (64 kg)         Medical decision makin yo M with h/o IVDU who p/w e/o skin breakdown, ulceration and surrounding cellulitis, phlebitis from IVDU. Requires IV abx, likely operative debridement. HDS, not septic. Cxs for surveillance as pt with chills, potentially subacute bacteremia. Not clinically in withdrawal, does inquire about methadone. No methadone at [facility] ED, pt offered suboxone but concerned about precipitating withdrawal and so declines for now.   Encouraged to notify MD early for withdrawal symptoms to reevaluate for suboxone administration. Admitted to the hospitalist.      Medications   vancomycin (VANCOCIN) 1,250 mg in dextrose 5 % 250 mL IVPB (has no administration in time range)   cefepime (MAXIPIME) 2000 mg IVPB minibag (has no administration in time range)       Is this patient to be included in the SEP-1 Core Measure due to severe sepsis or septic shock? No   Exclusion criteria - the patient is NOT to be included for SEP-1 Core Measure due to:  2+ SIRS criteria are not met     CONSULTS:  IP CONSULT TO HOSPITALIST      FINAL IMPRESSION      1. Infected ulcer of skin, with fat layer exposed ([facility] [address]    2.  IVDU (intravenous drug user)          DISPOSITION/PLAN   DISPOSITION Decision To Admit 11/11/2022 12:43:08 AM      (Please note that portions of this note were completed with a voice recognition program.Efforts were made to edit the dictations but occasionally words are mis-transcribed.)    Rodo Aguirre MD (electronically signed)  Attending Emergency Physician          Rodo Aguirre MD  11/11/22 1243

## 2022-11-11 NOTE — PLAN OF CARE
Problem: Discharge Planning  Goal: Discharge to home or other facility with appropriate resources  11/11/2022 1847 by Chastity Low RN  Outcome: Progressing  11/11/2022 0704 by Rosie Suresh RN  Outcome: Progressing  Flowsheets  Taken 11/11/2022 0704  Discharge to home or other facility with appropriate resources:   Identify barriers to discharge with patient and caregiver   Arrange for needed discharge resources and transportation as appropriate   Identify discharge learning needs (meds, wound care, etc)   Arrange for interpreters to assist at discharge as needed   Refer to discharge planning if patient needs post-hospital services based on physician order or complex needs related to functional status, cognitive ability or social support system  Taken 11/11/2022 0452  Discharge to home or other facility with appropriate resources:   Identify barriers to discharge with patient and caregiver   Arrange for needed discharge resources and transportation as appropriate   Identify discharge learning needs (meds, wound care, etc)   Refer to discharge planning if patient needs post-hospital services based on physician order or complex needs related to functional status, cognitive ability or social support system   Arrange for interpreters to assist at discharge as needed     Problem: Safety - Adult  Goal: Free from fall injury  Outcome: Progressing  Flowsheets  Taken 11/11/2022 1844 by Chastity Low RN  Free From Fall Injury: Instruct family/caregiver on patient safety  Taken 11/11/2022 0449 by Rosie Suresh RN  Free From Fall Injury: Instruct family/caregiver on patient safety     Problem: Skin/Tissue Integrity  Goal: Absence of new skin breakdown  Description: 1. Monitor for areas of redness and/or skin breakdown  2. Assess vascular access sites hourly  3. Every 4-6 hours minimum:  Change oxygen saturation probe site  4.   Every 4-6 hours:  If on nasal continuous positive airway pressure, respiratory therapy assess nares and determine need for appliance change or resting period.   Outcome: Progressing

## 2022-11-11 NOTE — CONSULTS
Clinical Pharmacy Note  Vancomycin Consult    Mack Lopez is a 28 y.o. male ordered Vancomycin for Cellulitis; consult received from Dr. Precious Gandhi to manage therapy. Also receiving Cefepime. Allergies:  Clindamycin, Clindamycin/lincomycin, and Azithromycin     Temp max:  Temp (24hrs), Av.6 °F (36.4 °C), Min:97.4 °F (36.3 °C), Max:97.7 °F (36.5 °C)      Recent Labs     22  0125   WBC 5.4       Recent Labs     22  0125   BUN 15   CREATININE 0.9       No intake or output data in the 24 hours ending 22 1133    Culture Results:      Ht Readings from Last 1 Encounters:   22 5' 6\" (1.676 m)        Wt Readings from Last 1 Encounters:   22 141 lb (64 kg)         Estimated Creatinine Clearance: 110 mL/min (based on SCr of 0.9 mg/dL). Assessment/Plan:  Day # 1 of vancomycin of 7 day order  Vancomcyin 1250mg in ED at 0300   Vancomycin 1000 mg IV every 12 hours ordered to start today at noon. Goal -600  Predicted   Level prior to 4 th dose      Thank you for the consult.    Marina Arnold [facility], 2022 11:35 AM

## 2022-11-11 NOTE — PROGRESS NOTES
This Rn was called into the room at the patient request. The pt asking for nicotine patch. Pt currently attends [facility] per pt. Pt asking for methadone dose at this time. Perfect serve sent to Dr. Linette Hall asking for MD recommendation. No new orders. Electronically signed by Jeffery Posada RN on 11/11/2022 at 10:36 AM    Call placed to pharmacy. Pharmacy to verify the methadone dose at this time.  Electronically signed by Jeffery Posada RN on 11/11/2022 at 10:37 AM

## 2022-11-11 NOTE — PROGRESS NOTES
Pt called the PCA into the room asking about his methadone medication. The pt told the PCA that he only took two of the 10 pills. This RN accompanied by the charge nurse, Kellen Freedman RN, approached the patient and asked about the medication. This RN explained to the pt that all of the medication had been taken this morning 5 pills at a time. Pt verbalizes understanding and states that he just wanted to make sure that all of the meds had been taken. Pt calm and cooperative at this time; stated that he feels ok. Pt able to make needs known, remains resting comfortably in bed at this time with no further questions or concerns.  Electronically signed by Kan Coleman RN on 11/11/2022 at 4:34 PM

## 2022-11-11 NOTE — PROGRESS NOTES
Pharmacy Medication Reconciliation Note     List of medications patient is currently taking is complete. Source of information:   1. [facility]  2. patient    Notes regarding home medications:   1.  Added Methadone 100mg daily to med list      Denies taking any other OTC or herbal medications    Kim Quinn [facility], [address] 11/11/2022 1:24 PM

## 2022-11-11 NOTE — PROGRESS NOTES
Patient arrived form [address] [place] [address] via stretcher. Patient wanted to shower first. Dr Juanjose Kraus was notified that patient arrived.

## 2022-11-12 LAB
ANION GAP SERPL CALCULATED.3IONS-SCNC: 11 MMOL/L (ref 3–16)
BASOPHILS ABSOLUTE: 0 K/UL (ref 0–0.2)
BASOPHILS RELATIVE PERCENT: 0.4 %
BUN BLDV-MCNC: 9 MG/DL (ref 7–20)
CALCIUM SERPL-MCNC: 9.1 MG/DL (ref 8.3–10.6)
CHLORIDE BLD-SCNC: 101 MMOL/L (ref 99–110)
CO2: 27 MMOL/L (ref 21–32)
CREAT SERPL-MCNC: 0.7 MG/DL (ref 0.9–1.3)
EOSINOPHILS ABSOLUTE: 0.1 K/UL (ref 0–0.6)
EOSINOPHILS RELATIVE PERCENT: 1.3 %
GFR SERPL CREATININE-BSD FRML MDRD: >60 ML/MIN/{1.73_M2}
GLUCOSE BLD-MCNC: 182 MG/DL (ref 70–99)
HCT VFR BLD CALC: 36.9 % (ref 40.5–52.5)
HEMOGLOBIN: 12.6 G/DL (ref 13.5–17.5)
LYMPHOCYTES ABSOLUTE: 0.9 K/UL (ref 1–5.1)
LYMPHOCYTES RELATIVE PERCENT: 18.4 %
MCH RBC QN AUTO: 26.7 PG (ref 26–34)
MCHC RBC AUTO-ENTMCNC: 34 G/DL (ref 31–36)
MCV RBC AUTO: 78.4 FL (ref 80–100)
MONOCYTES ABSOLUTE: 0.2 K/UL (ref 0–1.3)
MONOCYTES RELATIVE PERCENT: 4.6 %
NEUTROPHILS ABSOLUTE: 3.8 K/UL (ref 1.7–7.7)
NEUTROPHILS RELATIVE PERCENT: 75.3 %
PDW BLD-RTO: 13.7 % (ref 12.4–15.4)
PLATELET # BLD: 147 K/UL (ref 135–450)
PMV BLD AUTO: 7.2 FL (ref 5–10.5)
POTASSIUM REFLEX MAGNESIUM: 4.5 MMOL/L (ref 3.5–5.1)
RBC # BLD: 4.71 M/UL (ref 4.2–5.9)
SODIUM BLD-SCNC: 139 MMOL/L (ref 136–145)
VANCOMYCIN TROUGH: 7.2 UG/ML (ref 10–20)
WBC # BLD: 5 K/UL (ref 4–11)

## 2022-11-12 PROCEDURE — 6360000002 HC RX W HCPCS: Performed by: STUDENT IN AN ORGANIZED HEALTH CARE EDUCATION/TRAINING PROGRAM

## 2022-11-12 PROCEDURE — 36415 COLL VENOUS BLD VENIPUNCTURE: CPT

## 2022-11-12 PROCEDURE — 87040 BLOOD CULTURE FOR BACTERIA: CPT

## 2022-11-12 PROCEDURE — 87390 HIV-1 AG IA: CPT

## 2022-11-12 PROCEDURE — 6370000000 HC RX 637 (ALT 250 FOR IP): Performed by: INTERNAL MEDICINE

## 2022-11-12 PROCEDURE — 2580000003 HC RX 258: Performed by: INTERNAL MEDICINE

## 2022-11-12 PROCEDURE — 86701 HIV-1ANTIBODY: CPT

## 2022-11-12 PROCEDURE — 86702 HIV-2 ANTIBODY: CPT

## 2022-11-12 PROCEDURE — 1200000000 HC SEMI PRIVATE

## 2022-11-12 PROCEDURE — 2580000003 HC RX 258: Performed by: STUDENT IN AN ORGANIZED HEALTH CARE EDUCATION/TRAINING PROGRAM

## 2022-11-12 PROCEDURE — 80202 ASSAY OF VANCOMYCIN: CPT

## 2022-11-12 PROCEDURE — 80048 BASIC METABOLIC PNL TOTAL CA: CPT

## 2022-11-12 PROCEDURE — 6360000002 HC RX W HCPCS: Performed by: INTERNAL MEDICINE

## 2022-11-12 PROCEDURE — 85025 COMPLETE CBC W/AUTO DIFF WBC: CPT

## 2022-11-12 RX ORDER — LANOLIN ALCOHOL/MO/W.PET/CERES
9 CREAM (GRAM) TOPICAL NIGHTLY PRN
Status: DISCONTINUED | OUTPATIENT
Start: 2022-11-12 | End: 2022-11-14 | Stop reason: HOSPADM

## 2022-11-12 RX ORDER — NICOTINE 21 MG/24HR
1 PATCH, TRANSDERMAL 24 HOURS TRANSDERMAL DAILY
Status: DISCONTINUED | OUTPATIENT
Start: 2022-11-12 | End: 2022-11-14 | Stop reason: HOSPADM

## 2022-11-12 RX ADMIN — CEFEPIME 2000 MG: 2 INJECTION, POWDER, FOR SOLUTION INTRAVENOUS at 13:07

## 2022-11-12 RX ADMIN — VANCOMYCIN HYDROCHLORIDE 1000 MG: 1 INJECTION, POWDER, LYOPHILIZED, FOR SOLUTION INTRAVENOUS at 00:04

## 2022-11-12 RX ADMIN — VANCOMYCIN HYDROCHLORIDE 1000 MG: 1 INJECTION, POWDER, LYOPHILIZED, FOR SOLUTION INTRAVENOUS at 11:33

## 2022-11-12 RX ADMIN — CEFEPIME 2000 MG: 2 INJECTION, POWDER, FOR SOLUTION INTRAVENOUS at 02:09

## 2022-11-12 RX ADMIN — METHADONE HYDROCHLORIDE 100 MG: 10 TABLET ORAL at 11:31

## 2022-11-12 ASSESSMENT — PAIN SCALES - GENERAL: PAINLEVEL_OUTOF10: 0

## 2022-11-12 NOTE — PROGRESS NOTES
Patient is alert & oriented x4, pt is UAL, 2/4 bed rails up, bed in lowest position, fall precautions in place, call light within reach. Morning medications given. Morning assessment complete. Bilateral arms wrapped per patient request. Used nonadherent dressing and wrapped with kerlix. Will cont to monitor and reassess.   Electronically signed by Pardeep An RN on 11/12/2022 at 11:54 AM

## 2022-11-12 NOTE — PROGRESS NOTES
Hospitalist Progress Note      PCP: No primary care provider on file. Date of Admission: 11/10/2022    Chief Complaint: arm lesions    Hospital Course: 29 yo admitted with arm pain and swelling  Skin popping ivdu hx     Subjective: feels ok. Pain controlled. Receiving his methadone       Medications:  Reviewed    Infusion Medications    sodium chloride      sodium chloride 75 mL/hr at 11/11/22 0434     Scheduled Medications    nicotine  1 patch TransDERmal Daily    cefepime  2,000 mg IntraVENous Q12H    sodium chloride flush  10 mL IntraVENous 2 times per day    vancomycin  1,000 mg IntraVENous Q12H    vancomycin (VANCOCIN) intermittent dosing (placeholder)   Other RX Placeholder    methadone  100 mg Oral Daily     PRN Meds: sodium chloride flush, sodium chloride, ondansetron, polyethylene glycol, acetaminophen **OR** acetaminophen      Intake/Output Summary (Last 24 hours) at 11/12/2022 1832  Last data filed at 11/12/2022 1313  Gross per 24 hour   Intake 480 ml   Output --   Net 480 ml       Physical Exam Performed:    BP (!) 97/55   Pulse 52   Temp 97.7 °F (36.5 °C) (Oral)   Resp 17   Ht 5' 6\" (1.676 m)   Wt 138 lb 14.2 oz (63 kg)   SpO2 97%   BMI 22.42 kg/m²     General appearance: No apparent distress, appears stated age and cooperative. HEENT: Pupils equal, round, and reactive to light. Conjunctivae/corneas clear. Neck: Supple, with full range of motion. No jugular venous distention. Trachea midline. Respiratory:  Normal respiratory effort. Clear to auscultation, bilaterally without Rales/Wheezes/Rhonchi. Cardiovascular: Regular rate and rhythm with normal S1/S2 without murmurs, rubs or gallops. Abdomen: Soft, non-tender, non-distended with normal bowel sounds. Musculoskeletal: No clubbing, cyanosis or edema bilaterally. Full range of motion without deformity. Skin: ulcerated lesion of left forearm  Neurologic:  Neurovascularly intact without any focal sensory/motor deficits.  Cranial nerves: II-XII intact, grossly non-focal.  Psychiatric: Alert and oriented, thought content appropriate, normal insight  Capillary Refill: Brisk, 3 seconds, normal   Peripheral Pulses: +2 palpable, equal bilaterally       Labs:   Recent Labs     11/11/22 0125 11/12/22  1305   WBC 5.4 5.0   HGB 10.8* 12.6*   HCT 31.5* 36.9*    147     Recent Labs     11/11/22 0125 11/12/22  1305    139   K 3.9 4.5    101   CO2 28 27   BUN 15 9   CREATININE 0.9 0.7*   CALCIUM 9.1 9.1     Recent Labs     11/11/22  0125   AST 16   ALT 11   BILITOT <0.2   ALKPHOS 99     No results for input(s): INR in the last 72 hours. No results for input(s): Bruceherman Schulz in the last 72 hours. Urinalysis:      Lab Results   Component Value Date/Time    NITRU Negative 10/11/2021 06:00 PM    WBCUA 8 10/11/2021 06:00 PM    BACTERIA 4+ 10/11/2021 06:00 PM    RBCUA 0 10/11/2021 06:00 PM    BLOODU Negative 10/11/2021 06:00 PM    SPECGRAV 1.014 10/11/2021 06:00 PM    GLUCOSEU Negative 10/11/2021 06:00 PM       Radiology:  CT HAND RIGHT W WO CONTRAST   Final Result   1. Soft tissue ulceration dorsal to the 3rd metacarpal shaft. Dorsal   subcutaneous fat stranding of the hand and wrist compatible with cellulitis. No abscess or soft tissue gas. 2. No acute osseous abnormality. Assessment/Plan:    Active Hospital Problems    Diagnosis     Left arm cellulitis [R88.096]      Priority: Medium    Hypotension [I95.9]      Priority: Medium    IVDU (intravenous drug user) [F19.90]      Priority: Medium    Infected ulcer of skin, with fat layer exposed ([address] [B37.665, L08.9]      Priority: Medium    Multiple open wounds [T07. XXXA]      Priority: Medium    Hep C w/o coma, chronic (HCC) [B18.2]      Priority: Medium    Screening for HIV (human immunodeficiency virus) [Z11.4]      Priority: Medium     Left forearm ulcerative cellulitis  Due to ivdu  Continue iv abx vancomycin and cefipime  No surgery indicated presently  Will work on plastic surgery consult per ortho reccs  Continue methadone maintenance    DVT Prophylaxis: lovenox  Diet: ADULT DIET;  Regular  Code Status: Full Code  PT/OT Eval Status:     Dispo - 2-3 d      Diane Cardona MD

## 2022-11-12 NOTE — PLAN OF CARE
Problem: Discharge Planning  Goal: Discharge to home or other facility with appropriate resources  11/11/2022 2215 by Cj Kirby RN  Outcome: Progressing    Problem: Safety - Adult  Goal: Free from fall injury  11/11/2022 2215 by Cj Kirby RN  Outcome: Progressing  Flowsheets  Free From Fall Injury: Instruct family/caregiver on patient safety     Problem: Skin/Tissue Integrity  Goal: Absence of new skin breakdown  Description: 1. Monitor for areas of redness and/or skin breakdown  2. Assess vascular access sites hourly  3. Every 4-6 hours minimum:  Change oxygen saturation probe site  4. Every 4-6 hours:  If on nasal continuous positive airway pressure, respiratory therapy assess nares and determine need for appliance change or resting period.   11/11/2022 2215 by Cj Kirby RN  Outcome: Progressing

## 2022-11-12 NOTE — PROGRESS NOTES
Pt laying in bed. IV fluids infusing. No complaints of pain today. Wrappings remain in place. No further needs at this time.    Electronically signed by Bhavesh Retana RN on 11/12/2022 at 6:19 PM

## 2022-11-13 LAB
ANION GAP SERPL CALCULATED.3IONS-SCNC: 11 MMOL/L (ref 3–16)
BASOPHILS ABSOLUTE: 0 K/UL (ref 0–0.2)
BASOPHILS RELATIVE PERCENT: 0.3 %
BUN BLDV-MCNC: 9 MG/DL (ref 7–20)
CALCIUM SERPL-MCNC: 8.8 MG/DL (ref 8.3–10.6)
CHLORIDE BLD-SCNC: 104 MMOL/L (ref 99–110)
CO2: 25 MMOL/L (ref 21–32)
CREAT SERPL-MCNC: 0.7 MG/DL (ref 0.9–1.3)
EOSINOPHILS ABSOLUTE: 0.1 K/UL (ref 0–0.6)
EOSINOPHILS RELATIVE PERCENT: 2.3 %
GFR SERPL CREATININE-BSD FRML MDRD: >60 ML/MIN/{1.73_M2}
GLUCOSE BLD-MCNC: 126 MG/DL (ref 70–99)
HCT VFR BLD CALC: 34.9 % (ref 40.5–52.5)
HEMOGLOBIN: 11.8 G/DL (ref 13.5–17.5)
HIV AG/AB: NORMAL
HIV ANTIGEN: NORMAL
HIV-1 ANTIBODY: NORMAL
HIV-2 AB: NORMAL
LYMPHOCYTES ABSOLUTE: 1 K/UL (ref 1–5.1)
LYMPHOCYTES RELATIVE PERCENT: 21 %
MCH RBC QN AUTO: 26.6 PG (ref 26–34)
MCHC RBC AUTO-ENTMCNC: 33.7 G/DL (ref 31–36)
MCV RBC AUTO: 79 FL (ref 80–100)
MONOCYTES ABSOLUTE: 0.5 K/UL (ref 0–1.3)
MONOCYTES RELATIVE PERCENT: 9.9 %
NEUTROPHILS ABSOLUTE: 3.3 K/UL (ref 1.7–7.7)
NEUTROPHILS RELATIVE PERCENT: 66.5 %
PDW BLD-RTO: 13.5 % (ref 12.4–15.4)
PLATELET # BLD: 158 K/UL (ref 135–450)
PMV BLD AUTO: 7.4 FL (ref 5–10.5)
POTASSIUM REFLEX MAGNESIUM: 4.1 MMOL/L (ref 3.5–5.1)
RBC # BLD: 4.42 M/UL (ref 4.2–5.9)
SODIUM BLD-SCNC: 140 MMOL/L (ref 136–145)
WBC # BLD: 5 K/UL (ref 4–11)

## 2022-11-13 PROCEDURE — 1200000000 HC SEMI PRIVATE

## 2022-11-13 PROCEDURE — 6360000002 HC RX W HCPCS: Performed by: INTERNAL MEDICINE

## 2022-11-13 PROCEDURE — 2580000003 HC RX 258: Performed by: STUDENT IN AN ORGANIZED HEALTH CARE EDUCATION/TRAINING PROGRAM

## 2022-11-13 PROCEDURE — 6370000000 HC RX 637 (ALT 250 FOR IP): Performed by: NURSE PRACTITIONER

## 2022-11-13 PROCEDURE — 99222 1ST HOSP IP/OBS MODERATE 55: CPT | Performed by: SURGERY

## 2022-11-13 PROCEDURE — 6370000000 HC RX 637 (ALT 250 FOR IP): Performed by: INTERNAL MEDICINE

## 2022-11-13 PROCEDURE — 36415 COLL VENOUS BLD VENIPUNCTURE: CPT

## 2022-11-13 PROCEDURE — 2580000003 HC RX 258: Performed by: INTERNAL MEDICINE

## 2022-11-13 PROCEDURE — 94760 N-INVAS EAR/PLS OXIMETRY 1: CPT

## 2022-11-13 PROCEDURE — 80048 BASIC METABOLIC PNL TOTAL CA: CPT

## 2022-11-13 PROCEDURE — 85025 COMPLETE CBC W/AUTO DIFF WBC: CPT

## 2022-11-13 PROCEDURE — 6360000002 HC RX W HCPCS: Performed by: STUDENT IN AN ORGANIZED HEALTH CARE EDUCATION/TRAINING PROGRAM

## 2022-11-13 RX ADMIN — SODIUM CHLORIDE, PRESERVATIVE FREE 10 ML: 5 INJECTION INTRAVENOUS at 21:07

## 2022-11-13 RX ADMIN — Medication 9 MG: at 01:41

## 2022-11-13 RX ADMIN — VANCOMYCIN HYDROCHLORIDE 1000 MG: 1 INJECTION, POWDER, LYOPHILIZED, FOR SOLUTION INTRAVENOUS at 15:56

## 2022-11-13 RX ADMIN — METHADONE HYDROCHLORIDE 100 MG: 10 TABLET ORAL at 08:35

## 2022-11-13 RX ADMIN — VANCOMYCIN HYDROCHLORIDE 1000 MG: 1 INJECTION, POWDER, LYOPHILIZED, FOR SOLUTION INTRAVENOUS at 23:36

## 2022-11-13 RX ADMIN — Medication 9 MG: at 21:03

## 2022-11-13 RX ADMIN — CEFEPIME 2000 MG: 2 INJECTION, POWDER, FOR SOLUTION INTRAVENOUS at 12:19

## 2022-11-13 RX ADMIN — CEFEPIME 2000 MG: 2 INJECTION, POWDER, FOR SOLUTION INTRAVENOUS at 01:41

## 2022-11-13 RX ADMIN — VANCOMYCIN HYDROCHLORIDE 1000 MG: 1 INJECTION, POWDER, LYOPHILIZED, FOR SOLUTION INTRAVENOUS at 00:01

## 2022-11-13 RX ADMIN — VANCOMYCIN HYDROCHLORIDE 1000 MG: 1 INJECTION, POWDER, LYOPHILIZED, FOR SOLUTION INTRAVENOUS at 07:25

## 2022-11-13 ASSESSMENT — PAIN DESCRIPTION - ONSET: ONSET: ON-GOING

## 2022-11-13 ASSESSMENT — PAIN - FUNCTIONAL ASSESSMENT: PAIN_FUNCTIONAL_ASSESSMENT: PREVENTS OR INTERFERES SOME ACTIVE ACTIVITIES AND ADLS

## 2022-11-13 ASSESSMENT — PAIN DESCRIPTION - DESCRIPTORS: DESCRIPTORS: ACHING

## 2022-11-13 ASSESSMENT — PAIN DESCRIPTION - LOCATION: LOCATION: ARM

## 2022-11-13 ASSESSMENT — PAIN SCALES - GENERAL
PAINLEVEL_OUTOF10: 0
PAINLEVEL_OUTOF10: 2
PAINLEVEL_OUTOF10: 0

## 2022-11-13 ASSESSMENT — PAIN DESCRIPTION - ORIENTATION: ORIENTATION: LEFT;RIGHT

## 2022-11-13 ASSESSMENT — PAIN DESCRIPTION - FREQUENCY: FREQUENCY: CONTINUOUS

## 2022-11-13 ASSESSMENT — PAIN DESCRIPTION - PAIN TYPE: TYPE: ACUTE PAIN

## 2022-11-13 NOTE — PROGRESS NOTES
Patient is alert & oriented x4, ual no weakness noted, 2/4 bed rails up, bed in lowest position, fall precautions in place, call light within reach. Kerlix remains in place on BUE. Morning medications given. Morning assessment complete. IV fluids infusing. Will continue to monitor and reassess.   Electronically signed by Georgina Hope RN on 11/13/2022 at 8:54 AM

## 2022-11-13 NOTE — PROGRESS NOTES
Physician Progress Note      PATIENT:               Jose Shirley  CSN #:                  [id number]  :                       1995  ADMIT DATE:       11/10/2022 11:44 PM  [address] [place] DATE:  RESPONDING  PROVIDER #:        Don Roy MD          QUERY TEXT:    Patient admitted with L arm cellulitis. Per H&P, noted to also have ulceration   L hand, L  arm and R hand . If possible, please document in progress notes   and discharge summary the severity/depth of the ulcer: The medical record reflects the following:  Risk Factors: IVDA  Clinical Indicators: H&P \"Skin: Ulceration and cellulitis involving his left   forearm and hand, and his right hand   ulcerations and infections of his upper   extremities secondary to shooting up \"  Treatment: IV Cefepime and Vanco  Options provided:  -- Skin breakdown only  -- Exposed fat layer  -- Muscle involvement without necrosis  -- Muscle involvement with necrosis  -- Other - I will add my own diagnosis  -- Disagree - Not applicable / Not valid  -- Disagree - Clinically unable to determine / Unknown  -- Refer to Clinical Documentation Reviewer    PROVIDER RESPONSE TEXT:    This patient has a L hand, L arm and R hand ulcer with muscle involvement with   necrosis.     Query created by: Rubio Abreu on 2022 10:08 AM      Electronically signed by:  Don Roy MD 2022 5:34 PM

## 2022-11-13 NOTE — PLAN OF CARE
Problem: Discharge Planning  Goal: Discharge to home or other facility with appropriate resources  Outcome: Progressing  Flowsheets (Taken 11/12/2022 1955 by Shannan Robertson, RN)  Discharge to home or other facility with appropriate resources:   Identify barriers to discharge with patient and caregiver   Arrange for needed discharge resources and transportation as appropriate   Identify discharge learning needs (meds, wound care, etc)     Problem: Safety - Adult  Goal: Free from fall injury  Outcome: Progressing  Flowsheets (Taken 11/12/2022 2328 by Shannan Robertson, RN)  Free From Fall Injury:   Instruct family/caregiver on patient safety   Based on caregiver fall risk screen, instruct family/caregiver to ask for assistance with transferring infant if caregiver noted to have fall risk factors     Problem: Skin/Tissue Integrity  Goal: Absence of new skin breakdown  Description: 1. Monitor for areas of redness and/or skin breakdown  2. Assess vascular access sites hourly  3. Every 4-6 hours minimum:  Change oxygen saturation probe site  4. Every 4-6 hours:  If on nasal continuous positive airway pressure, respiratory therapy assess nares and determine need for appliance change or resting period.   Outcome: Progressing  Note: Pt absent from new skin breakdown      Problem: Pain  Goal: Verbalizes/displays adequate comfort level or baseline comfort level  Outcome: Progressing  Flowsheets (Taken 11/13/2022 0854)  Verbalizes/displays adequate comfort level or baseline comfort level:   Encourage patient to monitor pain and request assistance   Administer analgesics based on type and severity of pain and evaluate response   Consider cultural and social influences on pain and pain management     Problem: ABCDS Injury Assessment  Goal: Absence of physical injury  Outcome: Progressing  Flowsheets (Taken 11/13/2022 0854)  Absence of Physical Injury: Implement safety measures based on patient assessment

## 2022-11-13 NOTE — PROGRESS NOTES
PT AAO x4 per this shift. IVF infusing. Pt tolerating diet. BUE wraps with small amount of serosanguinous drainage. Reinforced. Opt tolerating well. IVF Infusing. Pt requesting sleep aid. NP Freeland Jin Chavezhoff notified. See emR with NO. Pt resting fair throughout shift. No further needs voiced at this time. Fall precautions in place. Call light within reach. Will continue to round.  Electronically signed by Luis F Engle RN on 11/13/2022 at 6:28 AM

## 2022-11-13 NOTE — PROGRESS NOTES
Clinical Pharmacy Note  Vancomycin Consult    Mack Prater is a 28 y.o. male ordered Vancomycin for cellulitus; consult received from Dr. Charles Newman to manage therapy. Also receiving cefepime. Allergies:  Clindamycin, Clindamycin/lincomycin, and Azithromycin     Temp max:  Temp (24hrs), Av.9 °F (36.6 °C), Min:97.7 °F (36.5 °C), Max:98.1 °F (36.7 °C)      Recent Labs     22  0125 22  1305   WBC 5.4 5.0       Recent Labs     22  0125 22  1305   BUN 15 9   CREATININE 0.9 0.7*         Intake/Output Summary (Last 24 hours) at 2022 0120  Last data filed at 2022 2200  Gross per 24 hour   Intake 1219.31 ml   Output --   Net 1219.31 ml       Culture Results:  Blood cultures: NGTD (day 2)    Ht Readings from Last 1 Encounters:   22 5' 6\" (1.676 m)        Wt Readings from Last 1 Encounters:   22 138 lb 14.2 oz (63 kg)         Estimated Creatinine Clearance: 140 mL/min (A) (based on SCr of 0.7 mg/dL (L)). Assessment:  Day # 3 of vancomycin. Current regimen: 1000 mg every 12 hours  Vancomycin level: 7.2 mg/L (~11 hours after dose)  Predicted AUC: 313    Plan:  Change regimen to vancomycin 1000mg IV Q8H  Next level after 4 doses of new regimen ( at 0600)    Thank you for the consult.    Francis Cordoba, PharmD  2022 1:22 AM

## 2022-11-13 NOTE — PROGRESS NOTES
Hospitalist Progress Note      PCP: No primary care provider on file. Date of Admission: 11/10/2022    Chief Complaint: arm lesions    Hospital Course: 27 yo admitted with arm pain and swelling  Skin popping ivdu hx     Subjective: feels ok. Pain controlled. Receiving his methadone       Medications:  Reviewed    Infusion Medications    sodium chloride       Scheduled Medications    nicotine  1 patch TransDERmal Daily    vancomycin  1,000 mg IntraVENous Q8H    cefepime  2,000 mg IntraVENous Q12H    sodium chloride flush  10 mL IntraVENous 2 times per day    vancomycin (VANCOCIN) intermittent dosing (placeholder)   Other RX Placeholder    methadone  100 mg Oral Daily     PRN Meds: melatonin, sodium chloride flush, sodium chloride, ondansetron, polyethylene glycol, acetaminophen **OR** acetaminophen      Intake/Output Summary (Last 24 hours) at 11/13/2022 1753  Last data filed at 11/13/2022 1300  Gross per 24 hour   Intake 3010 ml   Output --   Net 3010 ml         Physical Exam Performed:    BP (!) 106/59   Pulse 50   Temp 98.3 °F (36.8 °C) (Oral)   Resp 14   Ht 5' 6\" (1.676 m)   Wt 138 lb 0.1 oz (62.6 kg)   SpO2 96%   BMI 22.28 kg/m²     General appearance: No apparent distress, appears stated age and cooperative. HEENT: Pupils equal, round, and reactive to light. Conjunctivae/corneas clear. Neck: Supple, with full range of motion. No jugular venous distention. Trachea midline. Respiratory:  Normal respiratory effort. Clear to auscultation, bilaterally without Rales/Wheezes/Rhonchi. Cardiovascular: Regular rate and rhythm with normal S1/S2 without murmurs, rubs or gallops. Abdomen: Soft, non-tender, non-distended with normal bowel sounds. Musculoskeletal: No clubbing, cyanosis or edema bilaterally. Full range of motion without deformity. Skin: ulcerated lesion of left forearm  Neurologic:  Neurovascularly intact without any focal sensory/motor deficits.  Cranial nerves: II-XII intact, grossly non-focal.  Psychiatric: Alert and oriented, thought content appropriate, normal insight  Capillary Refill: Brisk, 3 seconds, normal   Peripheral Pulses: +2 palpable, equal bilaterally       Labs:   Recent Labs     11/11/22 0125 11/12/22  1305 11/13/22  0512   WBC 5.4 5.0 5.0   HGB 10.8* 12.6* 11.8*   HCT 31.5* 36.9* 34.9*    147 158       Recent Labs     11/11/22  0125 11/12/22  1305 11/13/22  0512    139 140   K 3.9 4.5 4.1    101 104   CO2 28 27 25   BUN 15 9 9   CREATININE 0.9 0.7* 0.7*   CALCIUM 9.1 9.1 8.8       Recent Labs     11/11/22 0125   AST 16   ALT 11   BILITOT <0.2   ALKPHOS 99       No results for input(s): INR in the last 72 hours. No results for input(s): Lattie Ko in the last 72 hours. Urinalysis:      Lab Results   Component Value Date/Time    NITRU Negative 10/11/2021 06:00 PM    WBCUA 8 10/11/2021 06:00 PM    BACTERIA 4+ 10/11/2021 06:00 PM    RBCUA 0 10/11/2021 06:00 PM    BLOODU Negative 10/11/2021 06:00 PM    SPECGRAV 1.014 10/11/2021 06:00 PM    GLUCOSEU Negative 10/11/2021 06:00 PM       Radiology:  CT HAND RIGHT W WO CONTRAST   Final Result   1. Soft tissue ulceration dorsal to the 3rd metacarpal shaft. Dorsal   subcutaneous fat stranding of the hand and wrist compatible with cellulitis. No abscess or soft tissue gas. 2. No acute osseous abnormality. Assessment/Plan:    Active Hospital Problems    Diagnosis     Left arm cellulitis [C64.111]      Priority: Medium    Hypotension [I95.9]      Priority: Medium    IVDU (intravenous drug user) [F19.90]      Priority: Medium    Infected ulcer of skin, with fat layer exposed ([facility] [address] [O42.778, L08.9]      Priority: Medium    Multiple open wounds [T07. XXXA]      Priority: Medium    Hep C w/o coma, chronic (HCC) [B18.2]      Priority: Medium    Screening for HIV (human immunodeficiency virus) [Z11.4]      Priority: Medium     Left forearm ulcerative cellulitis  Due to ivdu  Continue iv abx vancomycin and cefipime  No surgery indicated presently  Plastic surgery consult ordered  Likely will need STSG at some point    DVT Prophylaxis: lovenox  Diet: ADULT DIET;  Regular  Code Status: Full Code  PT/OT Eval Status:     Dispo - 2-3 d      Toña Hernandez MD

## 2022-11-13 NOTE — CONSULTS
[facility]    CC: Wounds of the bilateral upper extremities    Referring Physician: Orthopedics    HPI: This is an 28 y.o.male with a history of IV drug use and hepatitis C, who presented to [facility] with ulcerations of his upper extremities (L>R). He was in the ED, however left AMA and presented back secondary to worsening cellulitis. He was seen by hand surgery, however recommended evaluation with plastics.      PMHx:   Past Medical History:   Diagnosis Date    ADHD (attention deficit hyperactivity disorder)     Cyst     testicles    Depression     Drug abuse, IV ([address]     heroine    Hemorrhoids     Hepatitis C     Heroin use      PSHx:   Past Surgical History:   Procedure Laterality Date    NASAL SEPTUM SURGERY      TONSILLECTOMY      TYMPANOSTOMY TUBE PLACEMENT       Allergy:   Allergies   Allergen Reactions    Clindamycin     Clindamycin/Lincomycin Itching    Azithromycin Itching       SHx:   Social History     Socioeconomic History    Marital status: Single     Spouse name: Not on file    Number of children: Not on file    Years of education: Not on file    Highest education level: Not on file   Occupational History    Occupation: unemployed   Tobacco Use    Smoking status: Every Day     Packs/day: 0.50     Years: 6.00     Pack years: 3.00     Types: Cigarettes    Smokeless tobacco: Current     Types: Chew   Vaping Use    Vaping Use: Never used   Substance and Sexual Activity    Alcohol use: Not Currently     Comment: social    Drug use: Yes     Frequency: 7.0 times per week     Types: Marijuana (Weed), IV, Opiates      Comment: fenanyl IV    Sexual activity: Yes   Other Topics Concern    Not on file   Social History Narrative    Not on file     Social Determinants of Health     Financial Resource Strain: Not on file   Food Insecurity: Not on file   Transportation Needs: Not on file   Physical Activity: Not on file   Stress: Not on file   Social Connections: Not on file Intimate Partner Violence: Not on file   Housing Stability: Not on file     FHx: History reviewed and is noncontributory    Meds:   Current Facility-Administered Medications   Medication Dose Route Frequency Provider Last Rate Last Admin    nicotine (NICODERM CQ) 21 MG/24HR 1 patch  1 patch TransDERmal Daily Jere Craven MD   1 patch at 11/13/22 0835    melatonin tablet 9 mg  9 mg Oral Nightly PRN Anette S Puthoff, APRN - CNP   9 mg at 11/13/22 0141    vancomycin 1000 mg IVPB in 250 mL D5W addavial  1,000 mg IntraVENous Q8H Jere Craven MD   Stopped at 11/13/22 0838    cefepime (MAXIPIME) 2000 mg IVPB minibag  2,000 mg IntraVENous Q12H Sixto Mariscal MD   Stopped at 11/13/22 1258    sodium chloride flush 0.9 % injection 10 mL  10 mL IntraVENous 2 times per day Jere Craven MD   10 mL at 11/11/22 2100    sodium chloride flush 0.9 % injection 10 mL  10 mL IntraVENous PRN Jere Craven MD        0.9 % sodium chloride infusion   IntraVENous PRN Jere Craven MD        ondansetron [facility]) injection 4 mg  4 mg IntraVENous Q4H PRN Jere Craven MD        polyethylene glycol (GLYCOLAX) packet 17 g  17 g Oral Daily PRN Jere Craven MD        acetaminophen (TYLENOL) tablet 650 mg  650 mg Oral Q4H PRN Jere Craven MD        Or    acetaminophen (TYLENOL) suppository 650 mg  650 mg Rectal Q4H PRN Jere Craven MD        0.9 % sodium chloride infusion   IntraVENous Continuous Jere Craven MD 75 mL/hr at 11/11/22 0434 New Bag at 11/11/22 0434    vancomycin (VANCOCIN) intermittent dosing (placeholder)   Other RX Placeholder Dez Ch MD        methadone (DOLOPHINE) tablet 100 mg  100 mg Oral Daily Dez Ch MD   100 mg at 11/13/22 0835       ROS   Constitutional: Negative for chills and fever. HENT: Negative for congestion, facial swelling, and voice change. Eyes: Negative for photophobia and visual disturbance.    Respiratory: Negative for apnea, cough, chest tightness and shortness of breath. Cardiovascular: Negative for chest pain and palpitations. Gastrointestinal: Negative for dysphagia and early satiety. Genitourinary: Negative for difficulty urinating, dysuria, flank pain, frequency and hematuria. Musculoskeletal: Negative for new gait problem, joint swelling and myalgias. Skin: See HPI  Endocrine: negative for tremors, temperature intolerance or polydipsia. Allergic/Immunologic: Negative for new environmental or food allergies. Neurological: Negative for dizziness, seizures, speech difficulty, numbness. Hematological: Negative for adenopathy. Psychiatric/Behavioral: Negative for agitation and confusion. EXAM    BP (!) 106/59   Pulse 50   Temp 98.3 °F (36.8 °C) (Oral)   Resp 14   Ht 5' 6\" (1.676 m)   Wt 138 lb 0.1 oz (62.6 kg)   SpO2 96%   BMI 22.28 kg/m²     GEN: NAD  CVS: RRR  PULM: No respiratory distress  EXT: Left arm with multiple areas of skin loss with fibrinous exudate, but no spreading erythema or crepitance consistent with a necrotizing soft tissue infection    PATHOLOGY/WORKUP: MRSA    IMP: 27 y.o.male with MRSA abscesses to the upper extremities secondary to IV drug use. PLAN: No acute surgical indication at this time. Will likely require debridement and coverage with skin graft / local flap, however as he is not septic with worsening celluitis, no emergent debridement required. Once the fibrinous exudate has improved with local wound care, will then plan for coverage. In the interim, continue with antibiotics per ID recommendations and local wound care with VASHE soaked Kerlex to the wounds BID. Will follow-up in office with me in 2 weeks.     Malachi Angeles MD  [address] [place] [address] [address] Reconstructive Surgery  [phone]  11/13/22

## 2022-11-14 VITALS
OXYGEN SATURATION: 95 % | BODY MASS INDEX: 22.07 KG/M2 | TEMPERATURE: 98 F | DIASTOLIC BLOOD PRESSURE: 69 MMHG | RESPIRATION RATE: 16 BRPM | HEART RATE: 49 BPM | WEIGHT: 137.35 LBS | SYSTOLIC BLOOD PRESSURE: 108 MMHG | HEIGHT: 66 IN

## 2022-11-14 LAB
ANION GAP SERPL CALCULATED.3IONS-SCNC: 11 MMOL/L (ref 3–16)
BASOPHILS ABSOLUTE: 0 K/UL (ref 0–0.2)
BASOPHILS RELATIVE PERCENT: 0.4 %
BUN BLDV-MCNC: 10 MG/DL (ref 7–20)
CALCIUM SERPL-MCNC: 9.3 MG/DL (ref 8.3–10.6)
CHLORIDE BLD-SCNC: 98 MMOL/L (ref 99–110)
CO2: 26 MMOL/L (ref 21–32)
CREAT SERPL-MCNC: 0.6 MG/DL (ref 0.9–1.3)
EOSINOPHILS ABSOLUTE: 0.1 K/UL (ref 0–0.6)
EOSINOPHILS RELATIVE PERCENT: 2.2 %
GFR SERPL CREATININE-BSD FRML MDRD: >60 ML/MIN/{1.73_M2}
GLUCOSE BLD-MCNC: 101 MG/DL (ref 70–99)
HCT VFR BLD CALC: 35.8 % (ref 40.5–52.5)
HEMOGLOBIN: 12.3 G/DL (ref 13.5–17.5)
LYMPHOCYTES ABSOLUTE: 1 K/UL (ref 1–5.1)
LYMPHOCYTES RELATIVE PERCENT: 19.3 %
MCH RBC QN AUTO: 27 PG (ref 26–34)
MCHC RBC AUTO-ENTMCNC: 34.4 G/DL (ref 31–36)
MCV RBC AUTO: 78.5 FL (ref 80–100)
MONOCYTES ABSOLUTE: 0.5 K/UL (ref 0–1.3)
MONOCYTES RELATIVE PERCENT: 9.3 %
NEUTROPHILS ABSOLUTE: 3.7 K/UL (ref 1.7–7.7)
NEUTROPHILS RELATIVE PERCENT: 68.8 %
PDW BLD-RTO: 13.5 % (ref 12.4–15.4)
PLATELET # BLD: 148 K/UL (ref 135–450)
PMV BLD AUTO: 7.3 FL (ref 5–10.5)
POTASSIUM REFLEX MAGNESIUM: 4.4 MMOL/L (ref 3.5–5.1)
RBC # BLD: 4.56 M/UL (ref 4.2–5.9)
SODIUM BLD-SCNC: 135 MMOL/L (ref 136–145)
VANCOMYCIN RANDOM: 15.9 UG/ML
WBC # BLD: 5.4 K/UL (ref 4–11)

## 2022-11-14 PROCEDURE — 85025 COMPLETE CBC W/AUTO DIFF WBC: CPT

## 2022-11-14 PROCEDURE — 2580000003 HC RX 258: Performed by: STUDENT IN AN ORGANIZED HEALTH CARE EDUCATION/TRAINING PROGRAM

## 2022-11-14 PROCEDURE — 80048 BASIC METABOLIC PNL TOTAL CA: CPT

## 2022-11-14 PROCEDURE — 6360000002 HC RX W HCPCS: Performed by: STUDENT IN AN ORGANIZED HEALTH CARE EDUCATION/TRAINING PROGRAM

## 2022-11-14 PROCEDURE — 36415 COLL VENOUS BLD VENIPUNCTURE: CPT

## 2022-11-14 PROCEDURE — 6360000002 HC RX W HCPCS: Performed by: INTERNAL MEDICINE

## 2022-11-14 PROCEDURE — 6370000000 HC RX 637 (ALT 250 FOR IP): Performed by: INTERNAL MEDICINE

## 2022-11-14 PROCEDURE — 2580000003 HC RX 258: Performed by: INTERNAL MEDICINE

## 2022-11-14 PROCEDURE — 80202 ASSAY OF VANCOMYCIN: CPT

## 2022-11-14 RX ORDER — SULFAMETHOXAZOLE AND TRIMETHOPRIM 800; 160 MG/1; MG/1
1 TABLET ORAL 2 TIMES DAILY
Qty: 40 TABLET | Refills: 0 | Status: SHIPPED | OUTPATIENT
Start: 2022-11-14 | End: 2022-12-04

## 2022-11-14 RX ADMIN — VANCOMYCIN HYDROCHLORIDE 1000 MG: 1 INJECTION, POWDER, LYOPHILIZED, FOR SOLUTION INTRAVENOUS at 17:12

## 2022-11-14 RX ADMIN — METHADONE HYDROCHLORIDE 100 MG: 10 TABLET ORAL at 08:48

## 2022-11-14 RX ADMIN — VANCOMYCIN HYDROCHLORIDE 1000 MG: 1 INJECTION, POWDER, LYOPHILIZED, FOR SOLUTION INTRAVENOUS at 08:50

## 2022-11-14 RX ADMIN — SODIUM CHLORIDE, PRESERVATIVE FREE 10 ML: 5 INJECTION INTRAVENOUS at 09:00

## 2022-11-14 RX ADMIN — CEFEPIME 2000 MG: 2 INJECTION, POWDER, FOR SOLUTION INTRAVENOUS at 00:39

## 2022-11-14 RX ADMIN — CEFEPIME 2000 MG: 2 INJECTION, POWDER, FOR SOLUTION INTRAVENOUS at 13:31

## 2022-11-14 ASSESSMENT — PAIN SCALES - GENERAL
PAINLEVEL_OUTOF10: 0
PAINLEVEL_OUTOF10: 0

## 2022-11-14 NOTE — PROGRESS NOTES
Infectious Disease Follow up Notes  Admit Date: 11/10/2022  Hospital Day: 5    Antibiotics :   IV Vancomycin  IV Cefepime     CHIEF COMPLAINT:       IVDA  Hand cellulitis   Left fore arm wounds   MRSA infection   Subjective interval History :  28 y.o.. man with a history of IV drug abuse, heroin abuse, hepatitis C, ADHD, depression, admitted to the hospital secondary to multiple open wounds on the upper extremity  from IV drug abuse. He has large ulceration on the lateral aspect of the Left fore arm  with ongoing drainage. Patient was in ED recently but has left  AMA. He is now admitted because of worsening wound infection with associated cellulitis. Labs indicate creatinine 0.9 WBC normal 5.4 hemoglobin 10.9 LFT normal.  He was initiated on IV antibiotic we are consulted for recommendations. Location : Bilateral upper extremity pain.   Left arm  pain along the ulcer area     Quality : Aching, burning     Severity : 8/10    Duration :weeks      Timing : Constant  Context :   IVDA  Modifying factors : None  Associated signs and symptoms: Denies any fever chills          Interval History : Feels left hand is improving dressing is intact less drainage tolerating antibiotic therapy okay he insisted on leaving today that he says he may lose his job if he does not report to work      Past Medical History:    Past Medical History:   Diagnosis Date    ADHD (attention deficit hyperactivity disorder)     Cyst     testicles    Depression     Drug abuse, IV ([address]     heroine    Hemorrhoids     Hepatitis C     Heroin use        Past Surgical History:    Past Surgical History:   Procedure Laterality Date    NASAL SEPTUM SURGERY      TONSILLECTOMY      TYMPANOSTOMY TUBE PLACEMENT         Current Medications:    Outpatient Medications Marked as Taking for the 11/10/22 encounter [facility] Encounter)   Medication Sig Dispense Refill    methadone (DOLOPHINE) 10 MG/ML solution Take 100 mg by mouth daily.          Allergies:  Clindamycin, Clindamycin/lincomycin, and Azithromycin    Immunizations :   Immunization History   Administered Date(s) Administered    COVID-19, J&J, (age 18y+), IM, 0.5 mL 04/02/2021    Tdap (Boostrix, Adacel) 08/27/2021       Social History:     Social History     Tobacco Use    Smoking status: Every Day     Packs/day: 0.50     Years: 6.00     Pack years: 3.00     Types: Cigarettes    Smokeless tobacco: Current     Types: Chew   Vaping Use    Vaping Use: Never used   Substance Use Topics    Alcohol use: Not Currently     Comment: social    Drug use: Yes     Frequency: 7.0 times per week     Types: Marijuana (Nova ), IV, Opiates      Comment: fenanyl IV     Social History     Tobacco Use   Smoking Status Every Day    Packs/day: 0.50    Years: 6.00    Pack years: 3.00    Types: Cigarettes   Smokeless Tobacco Current    Types: Chew      Family History   Problem Relation Age of Onset    Diabetes Mother     Depression Mother     Substance Abuse Mother     High Blood Pressure Maternal Grandmother     Mental Illness Father     Substance Abuse Brother           REVIEW OF SYSTEMS:      Constitutional:  negative for fevers, chills, night sweats  Eyes:  negative for blurred vision, eye discharge, visual disturbance   HEENT:  negative for hearing loss, ear drainage,nasal congestion  Respiratory:  negative for cough, shortness of breath or hemoptysis   Cardiovascular:  negative for chest pain, palpitations, syncope  Gastrointestinal:  negative for nausea, vomiting, diarrhea, constipation, abdominal pain  Genitourinary:  negative for frequency, dysuria, urinary incontinence, hematuria  Hematologic/Lymphatic:  negative for easy bruising, bleeding and lymphadenopathy  Allergic/Immunologic:  negative for recurrent infections, angioedema, anaphylaxis   Endocrine:  negative for weight changes, polyuria, polydipsia and polyphagia  Musculoskeletal: OPen wounds on the hands and Left fore arm  pain, swelling, decreased range of motion  Integumentary: No rashes, skin lesions  Neurological:  negative for headaches, slurred speech, unilateral weakness  Psychiatric: negative for hallucinations,confusion,agitation.                 PHYSICAL EXAM:      Vitals:    /88   Pulse 62 Comment: manual  Temp 98.6 °F (37 °C) (Oral)   Resp 14   Ht 5' 6\" (1.676 m)   Wt 137 lb 5.6 oz (62.3 kg)   SpO2 97%   BMI 22.17 kg/m²     General Appearance: alert,in no acute distress, +  pallor, no icterus  poor skin hygiene    Skin: warm and dry, no rash or erythema  Head: normocephalic and atraumatic  Eyes: pupils equal, round, and reactive to light, conjunctivae normal  ENT: tympanic membrane, external ear and ear canal normal bilaterally, nose without deformity, nasal mucosa and turbinates normal without polyps  Neck: supple and non-tender without mass, no thyromegaly  no cervical lymphadenopathy  Pulmonary/Chest: clear to auscultation bilaterally- no wheezes, rales or rhonchi, normal air movement, no respiratory distress  Cardiovascular: normal rate, regular rhythm, normal S1 and S2, no murmurs, rubs, clicks, or gallops, no carotid bruits  Abdomen: soft, non-tender, non-distended, normal bowel sounds, no masses or organomegaly  Extremities: no cyanosis, clubbing or edema  Musculoskeletal: normal range of motion, no joint swelling, deformity or tenderness  Integumentary: No rashes, no abnormal skin lesions, no petechiae  Neurologic: reflexes normal and symmetric, no cranial nerve deficit  Psych:  Orientation, sensorium, mood normal            Lines: IV   Left hand dorsum and lEFT fore arm open wound  Rt hand wound with scab+     Data Review:    CBC:   Lab Results   Component Value Date    WBC 5.4 11/14/2022    HGB 12.3 (L) 11/14/2022    HCT 35.8 (L) 11/14/2022    MCV 78.5 (L) 11/14/2022     11/14/2022     RENAL:   Lab Results   Component Value Date    CREATININE 0.6 (L) 11/14/2022    BUN 10 11/14/2022     (L) 11/14/2022    K 4.4 11/14/2022    CL 98 (L) 11/14/2022    CO2 26 11/14/2022     SED RATE:   Lab Results   Component Value Date/Time    SEDRATE 13 12/23/2021 02:01 PM     CK:   Lab Results   Component Value Date/Time    CKTOTAL 737 09/18/2021 04:57 AM     CRP:   Lab Results   Component Value Date/Time    CRP 5.5 04/10/2022 10:16 PM     Hepatic Function Panel:   Lab Results   Component Value Date/Time    ALKPHOS 99 11/11/2022 01:25 AM    ALT 11 11/11/2022 01:25 AM    AST 16 11/11/2022 01:25 AM    PROT 6.7 11/11/2022 01:25 AM    PROT 8.1 10/06/2012 01:20 PM    BILITOT <0.2 11/11/2022 01:25 AM    BILIDIR <0.2 04/10/2022 10:16 PM    IBILI see below 04/10/2022 10:16 PM    LABALBU 3.9 11/11/2022 01:25 AM     UA:  Lab Results   Component Value Date/Time    COLORU YELLOW 10/11/2021 06:00 PM    CLARITYU Clear 10/11/2021 06:00 PM    GLUCOSEU Negative 10/11/2021 06:00 PM    BILIRUBINUR Negative 10/11/2021 06:00 PM    KETUA Negative 10/11/2021 06:00 PM    SPECGRAV 1.014 10/11/2021 06:00 PM    BLOODU Negative 10/11/2021 06:00 PM    PHUR 8.5 10/11/2021 06:00 PM    PHUR 8.5 10/11/2021 06:00 PM    PROTEINU Negative 10/11/2021 06:00 PM    UROBILINOGEN 4.0 10/11/2021 06:00 PM    NITRU Negative 10/11/2021 06:00 PM    LEUKOCYTESUR TRACE 10/11/2021 06:00 PM    LABMICR YES 10/11/2021 06:00 PM    URINETYPE NotGiven 10/11/2021 06:00 PM      Urine Microscopic:   Lab Results   Component Value Date/Time    BACTERIA 4+ 10/11/2021 06:00 PM    HYALCAST 0 10/11/2021 06:00 PM    WBCUA 8 10/11/2021 06:00 PM    RBCUA 0 10/11/2021 06:00 PM    EPIU 0 10/11/2021 06:00 PM     Urine Reflex to Culture:   Lab Results   Component Value Date/Time    URRFLXCULT Not Indicated 10/11/2021 06:00 PM       Status: Final result    Visible to patient: No (not released)    Next appt: None    0 Result Notes    1  Topic  Component Ref Range & Units 5/24/15 1625    Hep A IgM Non-reactive Non-reactive    Hep B Core Ab, IgM Non-reactive Non-reactive    Hep B S Ag Interp Non-reactive Non-reactive    Hep C Ab Interp Non-reactive REACTIVE Abnormal     Resulting Agency   [facility]                     MICRO: cultures reviewed and updated by me   Blood Culture:   Susceptibility    Staph aureus mrsa (1)    Antibiotic Interpretation Microscan  Method Status    ceFAZolin Resistant 16 mcg/mL BACTERIAL SUSCEPTIBILITY PANEL BY LIVIA     clindamycin Resistant <=0.5 mcg/mL BACTERIAL SUSCEPTIBILITY PANEL BY LIVIA     DAPTOmycin Sensitive <=0.5 mcg/mL BACTERIAL SUSCEPTIBILITY PANEL BY LIVIA     erythromycin Resistant >4 mcg/mL BACTERIAL SUSCEPTIBILITY PANEL BY LIVIA     linezolid Sensitive 2 mcg/mL BACTERIAL SUSCEPTIBILITY PANEL BY LIVIA     oxacillin Resistant >2 mcg/mL BACTERIAL SUSCEPTIBILITY PANEL BY LIVIA     tetracycline Sensitive <=4 mcg/mL BACTERIAL SUSCEPTIBILITY PANEL BY LIVIA     trimethoprim-sulfamethoxazole Sensitive <=0.5/9.5 mcg/mL BACTERIAL SUSCEPTIBILITY PANEL BY LIVIA     vancomycin Sensitive 1 mcg/mL BACTERIAL SUSCEPTIBILITY PANEL BY LIVIA        Narrative  Performed by: [facility]  ORDER#: [id number]                          ORDERED BY: Savanah Dunbar   SOURCE: Wound Left Forearm                 COLLECTED:  11/11/22 19:00      Lab Results   Component Value Date/Time    [facility]  11/11/2022 01:25 AM     No Growth to date. Any change in status will be called. BLOODCULT2  11/12/2022 01:05 PM     No Growth to date. Any change in status will be called. Respiratory Culture:  Lab Results   Component Value Date/Time    LABGRAM  11/11/2022 07:00 PM     1+ WBC's (Polymorphonuclear)  1+ Gram positive cocci       AFB:No results found for: AFBSMEAR  Viral Culture:  Lab Results   Component Value Date/Time    COVID19 Not Detected 04/10/2022 11:23 PM     Urine Culture: No results for input(s): Julio Flores in the last 72 hours. IMAGING:    CT HAND RIGHT W WO CONTRAST   Final Result   1.  Soft tissue ulceration dorsal to the 3rd metacarpal shaft. Dorsal   subcutaneous fat stranding of the hand and wrist compatible with cellulitis. No abscess or soft tissue gas. 2. No acute osseous abnormality.                All the pertinent images and reports for the current Hospitalization were reviewed by me     Scheduled Meds:   nicotine  1 patch TransDERmal Daily    vancomycin  1,000 mg IntraVENous Q8H    cefepime  2,000 mg IntraVENous Q12H    sodium chloride flush  10 mL IntraVENous 2 times per day    vancomycin (VANCOCIN) intermittent dosing (placeholder)   Other RX Placeholder    methadone  100 mg Oral Daily       Continuous Infusions:   sodium chloride         PRN Meds:  melatonin, sodium chloride flush, sodium chloride, ondansetron, polyethylene glycol, acetaminophen **OR** acetaminophen      Assessment:     Patient Active Problem List   Diagnosis    Substance induced mood disorder (HCC)    Opiate addiction (HCC)    Encephalopathy    Acute electrocardiogram changes    Drug use    AMS (altered mental status)    Syncope    Syncope and collapse    Cellulitis    Left arm cellulitis    Hypotension    IVDU (intravenous drug user)    Infected ulcer of skin, with fat layer exposed ([address]    Multiple open wounds    Hep C w/o coma, chronic (HCC)    Screening for HIV (human immunodeficiency virus)        Multiple open wound on both upper extremities  Left fore arm Lateral open wound with ulcer and cellulitis  Scabs from IVDA  Fentanyl abuse  Smoking+  ADHD  Hep C+  WBC normal  He has been leaving AMA         ON GOING open wounds from IVDA and possible skin popping upper extremities with large lateral wound on the Left Fore arm will need IV ABX and local care  for now      Do not feel there is any deep abscess on exam but has tissue loss from IVDA and needs meticulous wound care       He insists on leaving to work and wants to take oral abx      Labs, Microbiology, Radiology and all the pertinent results from current hospitalization and  care every where were reviewed  by me as a part of the evaluation   Plan:   Cont IV Vancomycin  x 1 gm  Q 12 h  Cont IV Cefepime x 2 gm q 12 hr  Wound cx MRSA noted   Blood cx -ve  HIV screen -ve  Has h/o Hep C for nearly 10 yrs per patient  Wound care  Home on oral Bactrim twice a day with plastic surgery follow up d/w pt      Discussed with patient/Family and Nursing   Risk of Complications/Morbidity: High      Illness(es)/ Infection present that pose threat to bodily function. There is potential for severe exacerbation of infection/side effects of treatment. Therapy requires intensive monitoring for antimicrobial agent toxicity. Discussed with patient/Family and Nursing staff     Thanks for allowing me to participate in your patient's care and please call me with any questions or concerns.     Adolfo Whitt MD  Infectious Disease  [facility] Physician  Phone: [phone]   Fax : [phone]

## 2022-11-14 NOTE — DISCHARGE INSTRUCTIONS
You need to contact [facility] at [phone] to set up follow-up appointments to change your dressings. They are located in the building at the corner of [address] [place] [address] and [facility] (in front of [facility] [place]). Their address is [address] [place] [address] Erin Bhavesh Mcdaniels [place] [address]. The dressings to both arms need to be changed daily. If you are unable to get an appointment at the wound clinic immediately, you can change your dressings at home. Before removing dressings, make sure to wash your hands well with soap and water. Remove old dressings. Cleanse wounds by spraying wound  into wounds. Also spray wound  onto dry gauze and pack that into the wounds. Cover with dry gauze and then wrap with the rolled gauze and secure with tape.

## 2022-11-14 NOTE — PROGRESS NOTES
Pt resting in bed at beginning of shift. He denies having any current pain, nausea, numbness, or tingling. Pulses palpable, skin warm to touch. He has strong  strength in BUE. Dressings to BUE are CDI. He is A&O, UAL. Will continue to monitor and assess.

## 2022-11-14 NOTE — PLAN OF CARE
Problem: Discharge Planning  Goal: Discharge to home or other facility with appropriate resources  11/13/2022 2248 by Chelly Adams RN  Outcome: Progressing  Flowsheets (Taken 11/13/2022 2248)  Discharge to home or other facility with appropriate resources:   Identify barriers to discharge with patient and caregiver   Arrange for needed discharge resources and transportation as appropriate     Problem: Safety - Adult  Goal: Free from fall injury  11/13/2022 2248 by Chelly Adams RN  Outcome: Progressing  Flowsheets (Taken 11/13/2022 2248)  Free From Fall Injury: Instruct family/caregiver on patient safety     Problem: Skin/Tissue Integrity  Goal: Absence of new skin breakdown  Description: 1. Monitor for areas of redness and/or skin breakdown  2. Assess vascular access sites hourly  3. Every 4-6 hours minimum:  Change oxygen saturation probe site  4. Every 4-6 hours:  If on nasal continuous positive airway pressure, respiratory therapy assess nares and determine need for appliance change or resting period.   11/13/2022 2248 by Chelly Adams RN  Outcome: Progressing     Problem: Pain  Goal: Verbalizes/displays adequate comfort level or baseline comfort level  11/13/2022 2248 by Chelly Adams RN  Outcome: Progressing  Flowsheets (Taken 11/13/2022 2248)  Verbalizes/displays adequate comfort level or baseline comfort level:   Encourage patient to monitor pain and request assistance   Assess pain using appropriate pain scale   Administer analgesics based on type and severity of pain and evaluate response   Implement non-pharmacological measures as appropriate and evaluate response     Problem: ABCDS Injury Assessment  Goal: Absence of physical injury  11/13/2022 2248 by Chelly Adams RN  Outcome: Progressing  Flowsheets (Taken 11/13/2022 2248)  Absence of Physical Injury: Implement safety measures based on patient assessment

## 2022-11-14 NOTE — PROGRESS NOTES
Hospitalist Progress Note      PCP: No primary care provider on file. Date of Admission: 11/10/2022    Chief Complaint: arm lesions    Hospital Course: 29 yo admitted with arm pain and swelling  Skin popping ivdu hx     Subjective: feels ok. Pain controlled. Receiving his methadone   Patient was promptly seen by plastic surgery yesterday  Plan of care in place  Medications:  Reviewed    Infusion Medications    sodium chloride       Scheduled Medications    nicotine  1 patch TransDERmal Daily    vancomycin  1,000 mg IntraVENous Q8H    cefepime  2,000 mg IntraVENous Q12H    sodium chloride flush  10 mL IntraVENous 2 times per day    vancomycin (VANCOCIN) intermittent dosing (placeholder)   Other RX Placeholder    methadone  100 mg Oral Daily     PRN Meds: melatonin, sodium chloride flush, sodium chloride, ondansetron, polyethylene glycol, acetaminophen **OR** acetaminophen      Intake/Output Summary (Last 24 hours) at 11/14/2022 1618  Last data filed at 11/14/2022 1430  Gross per 24 hour   Intake 1320 ml   Output --   Net 1320 ml         Physical Exam Performed:    /69   Pulse (!) 49   Temp 98 °F (36.7 °C) (Oral)   Resp 16   Ht 5' 6\" (1.676 m)   Wt 137 lb 5.6 oz (62.3 kg)   SpO2 95%   BMI 22.17 kg/m²     General appearance: No apparent distress, appears stated age and cooperative. HEENT: Pupils equal, round, and reactive to light. Conjunctivae/corneas clear. Neck: Supple, with full range of motion. No jugular venous distention. Trachea midline. Respiratory:  Normal respiratory effort. Clear to auscultation, bilaterally without Rales/Wheezes/Rhonchi. Cardiovascular: Regular rate and rhythm with normal S1/S2 without murmurs, rubs or gallops. Abdomen: Soft, non-tender, non-distended with normal bowel sounds. Musculoskeletal: No clubbing, cyanosis or edema bilaterally. Full range of motion without deformity.   Skin: ulcerated lesion of left forearm  Neurologic:  Neurovascularly intact without any focal sensory/motor deficits. Cranial nerves: II-XII intact, grossly non-focal.  Psychiatric: Alert and oriented, thought content appropriate, normal insight  Capillary Refill: Brisk, 3 seconds, normal   Peripheral Pulses: +2 palpable, equal bilaterally       Labs:   Recent Labs     11/12/22  1305 11/13/22  0512 11/14/22  0515   WBC 5.0 5.0 5.4   HGB 12.6* 11.8* 12.3*   HCT 36.9* 34.9* 35.8*    158 148       Recent Labs     11/12/22  1305 11/13/22  0512 11/14/22  0515    140 135*   K 4.5 4.1 4.4    104 98*   CO2 27 25 26   BUN 9 9 10   CREATININE 0.7* 0.7* 0.6*   CALCIUM 9.1 8.8 9.3       No results for input(s): AST, ALT, BILIDIR, BILITOT, ALKPHOS in the last 72 hours. No results for input(s): INR in the last 72 hours. No results for input(s): Марина David in the last 72 hours. Urinalysis:      Lab Results   Component Value Date/Time    NITRU Negative 10/11/2021 06:00 PM    WBCUA 8 10/11/2021 06:00 PM    BACTERIA 4+ 10/11/2021 06:00 PM    RBCUA 0 10/11/2021 06:00 PM    BLOODU Negative 10/11/2021 06:00 PM    SPECGRAV 1.014 10/11/2021 06:00 PM    GLUCOSEU Negative 10/11/2021 06:00 PM       Radiology:  CT HAND RIGHT W WO CONTRAST   Final Result   1. Soft tissue ulceration dorsal to the 3rd metacarpal shaft. Dorsal   subcutaneous fat stranding of the hand and wrist compatible with cellulitis. No abscess or soft tissue gas. 2. No acute osseous abnormality. Assessment/Plan:    Active Hospital Problems    Diagnosis     Left arm cellulitis [J68.453]      Priority: Medium    Hypotension [I95.9]      Priority: Medium    IVDU (intravenous drug user) [F19.90]      Priority: Medium    Infected ulcer of skin, with fat layer exposed ([address] [C76.741, L08.9]      Priority: Medium    Multiple open wounds [T07. XXXA]      Priority: Medium    Hep C w/o coma, chronic (HCC) [B18.2]      Priority: Medium    Screening for HIV (human immunodeficiency virus) [Z11.4]      Priority: Medium     Left forearm ulcerative cellulitis  Due to ivdu  Continue iv abx vancomycin and cefipime  No surgery indicated presently  Plastic surgery consult recommendations greatly appreciated  continue with antibiotics per ID recommendations and local wound care with VASHE soaked Kerlex to the wounds BID. Likely will need STSG at some point  Will transition to oral antibiotics after discussing with ID. DVT Prophylaxis: lovenox  Diet: ADULT DIET;  Regular  Code Status: Full Code  PT/OT Eval Status:     Dispo - 2-3 d      Beena Wynne MD

## 2022-11-14 NOTE — CARE COORDINATION
INITIAL CASE MANAGEMENT ASSESSMENT AND DISCHARGE SUMMARY    Reviewed chart, met with patient to assess possible discharge needs. Explained Case Management role/services. Living Situation: Patient lives alone. Stated he has support in community. ADLs: manages on his own     DME: none    PT/OT Recs: n/a     Active Services: [facility]     Transportation: does not drive     Medications: takes on his own    PCP: none--Provided PCP and clinic list      PLAN/COMMENTS: Patient plans to return home. No needs at this time. SW/CM will follow and assist as needed.     Electronically signed by JOYCE Callahan, DEV, Case Management on 11/14/2022 at 4:30 PM  Vibha [phone]

## 2022-11-14 NOTE — PROGRESS NOTES
Dressing change done at 1500 with wet to dry dressing with vashe and wrapped arm with kerlix. Pt tolerated well. No complaints of pain.   Electronically signed by Enriqueta Guerrier RN on 11/13/2022 at 7:26 PM

## 2022-11-14 NOTE — PROGRESS NOTES
Data- discharge order received, pt verbalized agreement to discharge, disposition to previous residence, no needs for HHC/DME. Action- discharge instructions prepared and given to patient, pt verbalized understanding. Medication information packet given r/t NEW and/or CHANGED prescriptions emphasizing name/purpose/side effects, pt verbalized understanding. Discharge instruction summary: Diet- general, Activity- UAL, Primary Care Physician as follows: No primary care provider on file. None f/u appointment as pt does not have a PCP, prescription for antibiotic given to pt to have filled. Response- Pt belongings gathered, IV removed. Disposition is home (no HHC/DME needs), pt walked independently from unit with belongings, no complications.

## 2022-11-14 NOTE — PROGRESS NOTES
Clinical Pharmacy Note  Vancomycin Consult    Mack Glass is a 28 y.o. male ordered Vancomycin for cellulitus; consult received from Dr. Sagar Wilson to manage therapy. Also receiving cefepime. Allergies:  Clindamycin, Clindamycin/lincomycin, and Azithromycin     Temp max:  Temp (24hrs), Av.6 °F (37 °C), Min:98.6 °F (37 °C), Max:98.6 °F (37 °C)      Recent Labs     22  1305 22  0512 22  0515   WBC 5.0 5.0 5.4         Recent Labs     22  1305 22  0512 22  0515   BUN 9 9 10   CREATININE 0.7* 0.7* 0.6*           Intake/Output Summary (Last 24 hours) at 2022 1118  Last data filed at 2022 0430  Gross per 24 hour   Intake 1080 ml   Output --   Net 1080 ml         Culture Results:  Blood cultures: NGTD (day 2)    Ht Readings from Last 1 Encounters:   22 5' 6\" (1.676 m)        Wt Readings from Last 1 Encounters:   22 137 lb 5.6 oz (62.3 kg)         Estimated Creatinine Clearance: 162 mL/min (A) (based on SCr of 0.6 mg/dL (L)). Assessment:  Day # 4 of vancomycin 7 day order  Current regimen: 1000 mg every 8 hours  Vancomycin level: 15.9 mg/L (5.5 hours after dose)  Predicted AUC: 470    Plan:  Continue vancomycin 1000mg IV Q8H  Daily SrCr ordered and next level based on results from 11-15  Thank you for the consult.    Aric Velez, [facility], 2022 11:21 AM

## 2022-11-14 NOTE — PROGRESS NOTES
Pt is alert and oriented x4, resting quietly in bed. Melatonin given and intake tolerated well. No complaints of nausea, vomiting, or pain at this time. Dressings remain clean, dry, and intact. No other needs made known at this time. Fall precautions in place. Call light within reach. Will continue to monitor.     Electronically signed by Jose De Jesus Leblanc RN on 11/13/2022 at 10:48 PM

## 2022-11-14 NOTE — PLAN OF CARE
Problem: Discharge Planning  Goal: Discharge to home or other facility with appropriate resources  Outcome: Progressing  Flowsheets (Taken 11/14/2022 1459)  Discharge to home or other facility with appropriate resources:   Identify barriers to discharge with patient and caregiver   Identify discharge learning needs (meds, wound care, etc)     Problem: Safety - Adult  Goal: Free from fall injury  Outcome: Progressing  Flowsheets (Taken 11/14/2022 1459)  Free From Fall Injury: Instruct family/caregiver on patient safety     Problem: Skin/Tissue Integrity  Goal: Absence of new skin breakdown  Description: 1. Monitor for areas of redness and/or skin breakdown  2. Assess vascular access sites hourly  3. Every 4-6 hours minimum:  Change oxygen saturation probe site  4. Every 4-6 hours:  If on nasal continuous positive airway pressure, respiratory therapy assess nares and determine need for appliance change or resting period. Outcome: Progressing  Note: Pt with wounds to BUE. No other areas of skin breakdown noted. Will monitor for changes.      Problem: Pain  Goal: Verbalizes/displays adequate comfort level or baseline comfort level  Outcome: Progressing  Flowsheets (Taken 11/14/2022 1459)  Verbalizes/displays adequate comfort level or baseline comfort level:   Encourage patient to monitor pain and request assistance   Administer analgesics based on type and severity of pain and evaluate response   Assess pain using appropriate pain scale   Implement non-pharmacological measures as appropriate and evaluate response     Problem: ABCDS Injury Assessment  Goal: Absence of physical injury  Outcome: Progressing  Flowsheets (Taken 11/14/2022 1459)  Absence of Physical Injury: Implement safety measures based on patient assessment

## 2022-11-15 LAB — BLOOD CULTURE, ROUTINE: NORMAL

## 2022-11-16 LAB
ANAEROBIC CULTURE: ABNORMAL
CULTURE, BLOOD 2: NORMAL
GRAM STAIN RESULT: ABNORMAL
ORGANISM: ABNORMAL
WOUND/ABSCESS: ABNORMAL

## 2022-12-11 PROBLEM — Z11.4 SCREENING FOR HIV (HUMAN IMMUNODEFICIENCY VIRUS): Status: RESOLVED | Noted: 2022-11-11 | Resolved: 2022-12-11

## 2022-12-15 NOTE — DISCHARGE SUMMARY
Hospital Medicine Discharge Summary    Patient ID: Stephany Overton      Patient's PCP: No primary care provider on file. Admit Date: 11/10/2022     Discharge Date: 11/14/2022      Admitting Provider: Alexander Pitts MD     Discharge Provider: Kylee Gannon MD     Discharge Diagnoses: Active Hospital Problems    Diagnosis     Left arm cellulitis [V99.251]      Priority: Medium    Hypotension [I95.9]      Priority: Medium    IVDU (intravenous drug user) [F19.90]      Priority: Medium    Infected ulcer of skin, with fat layer exposed ([address] [G75.548, L08.9]      Priority: Medium    Multiple open wounds [T07. XXXA]      Priority: Medium    Hep C w/o coma, chronic (HCC) [B18.2]      Priority: Medium       The patient was seen and examined on day of discharge and this discharge summary is in conjunction with any daily progress note from day of discharge. Hospital Course: Left forearm ulcerative cellulitis  Due to ivdu  Continue iv abx vancomycin and cefipime  No surgery indicated presently  Plastic surgery consult recommendations greatly appreciated  continue with antibiotics per ID recommendations and local wound care with VASHE soaked Kerlex to the wounds BID. Likely will need STSG at some point  Will transition to oral antibiotics after discussing with ID. Labs:  For convenience and continuity at follow-up the following most recent labs are provided:      CBC:    Lab Results   Component Value Date/Time    WBC 5.4 11/14/2022 05:15 AM    HGB 12.3 11/14/2022 05:15 AM    HCT 35.8 11/14/2022 05:15 AM     11/14/2022 05:15 AM       Renal:    Lab Results   Component Value Date/Time     11/14/2022 05:15 AM    K 4.4 11/14/2022 05:15 AM    CL 98 11/14/2022 05:15 AM    CO2 26 11/14/2022 05:15 AM    BUN 10 11/14/2022 05:15 AM    CREATININE 0.6 11/14/2022 05:15 AM    CALCIUM 9.3 11/14/2022 05:15 AM    PHOS 2.5 09/18/2021 04:57 AM         Significant Diagnostic Studies    Radiology:   CT HAND RIGHT W WO CONTRAST   Final Result   1. Soft tissue ulceration dorsal to the 3rd metacarpal shaft. Dorsal   subcutaneous fat stranding of the hand and wrist compatible with cellulitis. No abscess or soft tissue gas. 2. No acute osseous abnormality. Consults:     IP CONSULT TO HOSPITALIST  IP CONSULT TO INFECTIOUS DISEASES  PHARMACY TO DOSE VANCOMYCIN  IP CONSULT TO PLASTIC SURGERY  IP CONSULT TO PLASTIC SURGERY    Disposition:  good     Condition at Discharge: Stable    Discharge Instructions/Follow-up:  Dr Yoni Perera for STSG    Code Status:  Prior     Activity: activity as tolerated    Diet: regular diet      Discharge Medications:     Discharge Medication List as of 11/14/2022  5:31 PM             Details   sulfamethoxazole-trimethoprim (BACTRIM DS;SEPTRA DS) 800-160 MG per tablet Take 1 tablet by mouth 2 times daily for 20 days, Disp-40 tablet, R-0Print                Details   methadone (DOLOPHINE) 10 MG/ML solution Take 100 mg by mouth daily. Historical Med      ondansetron (ZOFRAN ODT) 4 MG disintegrating tablet Take 1 tablet by mouth every 8 hours as needed for Nausea, Disp-20 tablet, R-0Normal             Time Spent on discharge: 45 min  in the examination, evaluation, counseling and review of medications and discharge plan. Signed:    Matilde Kinney MD   12/15/2022      Thank you No primary care provider on file. for the opportunity to be involved in this patient's care. If you have any questions or concerns, please feel free to contact me at [phone].

## 2023-01-19 ENCOUNTER — APPOINTMENT (OUTPATIENT)
Dept: GENERAL RADIOLOGY | Age: 28
DRG: 383 | End: 2023-01-19
Payer: MEDICAID

## 2023-01-19 ENCOUNTER — HOSPITAL ENCOUNTER (INPATIENT)
Age: 28
LOS: 2 days | Discharge: LEFT BEFORE DISCHARGE | DRG: 383 | End: 2023-01-21
Attending: EMERGENCY MEDICINE | Admitting: STUDENT IN AN ORGANIZED HEALTH CARE EDUCATION/TRAINING PROGRAM
Payer: MEDICAID

## 2023-01-19 DIAGNOSIS — S61.509A: ICD-10-CM

## 2023-01-19 DIAGNOSIS — S66.909A: ICD-10-CM

## 2023-01-19 DIAGNOSIS — S46.909A: ICD-10-CM

## 2023-01-19 DIAGNOSIS — F19.90 IVDU (INTRAVENOUS DRUG USER): ICD-10-CM

## 2023-01-19 DIAGNOSIS — L03.119 CELLULITIS OF UPPER EXTREMITY, UNSPECIFIED LATERALITY: Primary | ICD-10-CM

## 2023-01-19 DIAGNOSIS — S51.009A: ICD-10-CM

## 2023-01-19 DIAGNOSIS — S56.909A: ICD-10-CM

## 2023-01-19 DIAGNOSIS — S51.809A: ICD-10-CM

## 2023-01-19 PROBLEM — S66.901A OPEN WOUND OF RIGHT HAND EXCEPT FINGERS WITH TENDON INVOLVEMENT: Status: ACTIVE | Noted: 2023-01-19

## 2023-01-19 PROBLEM — R79.89 LFT ELEVATION: Status: ACTIVE | Noted: 2023-01-19

## 2023-01-19 PROBLEM — S61.401A OPEN WOUND OF RIGHT HAND EXCEPT FINGERS WITH TENDON INVOLVEMENT: Status: ACTIVE | Noted: 2023-01-19

## 2023-01-19 PROBLEM — A49.02 MRSA (METHICILLIN RESISTANT STAPHYLOCOCCUS AUREUS) INFECTION: Status: ACTIVE | Noted: 2023-01-19

## 2023-01-19 PROBLEM — T14.8XXA OPEN WOUND: Status: ACTIVE | Noted: 2022-11-11

## 2023-01-19 LAB
A/G RATIO: 1.3 (ref 1.1–2.2)
ALBUMIN SERPL-MCNC: 4.3 G/DL (ref 3.4–5)
ALP BLD-CCNC: 141 U/L (ref 40–129)
ALT SERPL-CCNC: 209 U/L (ref 10–40)
ANION GAP SERPL CALCULATED.3IONS-SCNC: 12 MMOL/L (ref 3–16)
AST SERPL-CCNC: 71 U/L (ref 15–37)
BASOPHILS ABSOLUTE: 0 K/UL (ref 0–0.2)
BASOPHILS RELATIVE PERCENT: 0.4 %
BILIRUB SERPL-MCNC: 0.4 MG/DL (ref 0–1)
BUN BLDV-MCNC: 16 MG/DL (ref 7–20)
CALCIUM SERPL-MCNC: 9.6 MG/DL (ref 8.3–10.6)
CHLORIDE BLD-SCNC: 100 MMOL/L (ref 99–110)
CO2: 27 MMOL/L (ref 21–32)
CREAT SERPL-MCNC: 0.8 MG/DL (ref 0.9–1.3)
EOSINOPHILS ABSOLUTE: 0 K/UL (ref 0–0.6)
EOSINOPHILS RELATIVE PERCENT: 0.6 %
GFR SERPL CREATININE-BSD FRML MDRD: >60 ML/MIN/{1.73_M2}
GLUCOSE BLD-MCNC: 139 MG/DL (ref 70–99)
HCT VFR BLD CALC: 35.2 % (ref 40.5–52.5)
HEMOGLOBIN: 11.8 G/DL (ref 13.5–17.5)
LIPASE: 10 U/L (ref 13–60)
LYMPHOCYTES ABSOLUTE: 1.1 K/UL (ref 1–5.1)
LYMPHOCYTES RELATIVE PERCENT: 19.4 %
MCH RBC QN AUTO: 25.9 PG (ref 26–34)
MCHC RBC AUTO-ENTMCNC: 33.6 G/DL (ref 31–36)
MCV RBC AUTO: 77 FL (ref 80–100)
MONOCYTES ABSOLUTE: 0.5 K/UL (ref 0–1.3)
MONOCYTES RELATIVE PERCENT: 8.1 %
NEUTROPHILS ABSOLUTE: 4.1 K/UL (ref 1.7–7.7)
NEUTROPHILS RELATIVE PERCENT: 71.5 %
PDW BLD-RTO: 14 % (ref 12.4–15.4)
PLATELET # BLD: 234 K/UL (ref 135–450)
PMV BLD AUTO: 7.5 FL (ref 5–10.5)
POTASSIUM REFLEX MAGNESIUM: 3.7 MMOL/L (ref 3.5–5.1)
RBC # BLD: 4.57 M/UL (ref 4.2–5.9)
SODIUM BLD-SCNC: 139 MMOL/L (ref 136–145)
TOTAL PROTEIN: 7.5 G/DL (ref 6.4–8.2)
WBC # BLD: 5.7 K/UL (ref 4–11)

## 2023-01-19 PROCEDURE — 2580000003 HC RX 258: Performed by: EMERGENCY MEDICINE

## 2023-01-19 PROCEDURE — 99222 1ST HOSP IP/OBS MODERATE 55: CPT | Performed by: INTERNAL MEDICINE

## 2023-01-19 PROCEDURE — 96366 THER/PROPH/DIAG IV INF ADDON: CPT

## 2023-01-19 PROCEDURE — 73130 X-RAY EXAM OF HAND: CPT

## 2023-01-19 PROCEDURE — 83690 ASSAY OF LIPASE: CPT

## 2023-01-19 PROCEDURE — 2580000003 HC RX 258: Performed by: STUDENT IN AN ORGANIZED HEALTH CARE EDUCATION/TRAINING PROGRAM

## 2023-01-19 PROCEDURE — 6360000002 HC RX W HCPCS: Performed by: EMERGENCY MEDICINE

## 2023-01-19 PROCEDURE — 6360000002 HC RX W HCPCS: Performed by: STUDENT IN AN ORGANIZED HEALTH CARE EDUCATION/TRAINING PROGRAM

## 2023-01-19 PROCEDURE — 96367 TX/PROPH/DG ADDL SEQ IV INF: CPT

## 2023-01-19 PROCEDURE — 85025 COMPLETE CBC W/AUTO DIFF WBC: CPT

## 2023-01-19 PROCEDURE — 6370000000 HC RX 637 (ALT 250 FOR IP): Performed by: STUDENT IN AN ORGANIZED HEALTH CARE EDUCATION/TRAINING PROGRAM

## 2023-01-19 PROCEDURE — 36415 COLL VENOUS BLD VENIPUNCTURE: CPT

## 2023-01-19 PROCEDURE — 99285 EMERGENCY DEPT VISIT HI MDM: CPT

## 2023-01-19 PROCEDURE — 1200000000 HC SEMI PRIVATE

## 2023-01-19 PROCEDURE — 96365 THER/PROPH/DIAG IV INF INIT: CPT

## 2023-01-19 PROCEDURE — 73090 X-RAY EXAM OF FOREARM: CPT

## 2023-01-19 PROCEDURE — 80053 COMPREHEN METABOLIC PANEL: CPT

## 2023-01-19 RX ORDER — ENOXAPARIN SODIUM 100 MG/ML
40 INJECTION SUBCUTANEOUS DAILY
Status: DISCONTINUED | OUTPATIENT
Start: 2023-01-20 | End: 2023-01-21 | Stop reason: HOSPADM

## 2023-01-19 RX ORDER — POLYETHYLENE GLYCOL 3350 17 G/17G
17 POWDER, FOR SOLUTION ORAL DAILY PRN
Status: DISCONTINUED | OUTPATIENT
Start: 2023-01-19 | End: 2023-01-21 | Stop reason: HOSPADM

## 2023-01-19 RX ORDER — ONDANSETRON 2 MG/ML
4 INJECTION INTRAMUSCULAR; INTRAVENOUS EVERY 6 HOURS PRN
Status: DISCONTINUED | OUTPATIENT
Start: 2023-01-19 | End: 2023-01-21 | Stop reason: HOSPADM

## 2023-01-19 RX ORDER — SODIUM CHLORIDE 0.9 % (FLUSH) 0.9 %
5-40 SYRINGE (ML) INJECTION PRN
Status: DISCONTINUED | OUTPATIENT
Start: 2023-01-19 | End: 2023-01-21 | Stop reason: HOSPADM

## 2023-01-19 RX ORDER — SODIUM CHLORIDE 0.9 % (FLUSH) 0.9 %
5-40 SYRINGE (ML) INJECTION EVERY 12 HOURS SCHEDULED
Status: DISCONTINUED | OUTPATIENT
Start: 2023-01-19 | End: 2023-01-21 | Stop reason: HOSPADM

## 2023-01-19 RX ORDER — ACETAMINOPHEN 650 MG/1
650 SUPPOSITORY RECTAL EVERY 6 HOURS PRN
Status: DISCONTINUED | OUTPATIENT
Start: 2023-01-19 | End: 2023-01-21 | Stop reason: HOSPADM

## 2023-01-19 RX ORDER — ACETAMINOPHEN 325 MG/1
650 TABLET ORAL EVERY 6 HOURS PRN
Status: DISCONTINUED | OUTPATIENT
Start: 2023-01-19 | End: 2023-01-21 | Stop reason: HOSPADM

## 2023-01-19 RX ORDER — METHADONE HYDROCHLORIDE 10 MG/1
30 TABLET ORAL DAILY
Status: DISCONTINUED | OUTPATIENT
Start: 2023-01-19 | End: 2023-01-21 | Stop reason: HOSPADM

## 2023-01-19 RX ORDER — NICOTINE 21 MG/24HR
1 PATCH, TRANSDERMAL 24 HOURS TRANSDERMAL DAILY
Status: DISCONTINUED | OUTPATIENT
Start: 2023-01-19 | End: 2023-01-21 | Stop reason: HOSPADM

## 2023-01-19 RX ORDER — NALOXONE HYDROCHLORIDE 0.4 MG/ML
0.4 INJECTION, SOLUTION INTRAMUSCULAR; INTRAVENOUS; SUBCUTANEOUS PRN
Status: DISCONTINUED | OUTPATIENT
Start: 2023-01-19 | End: 2023-01-21 | Stop reason: HOSPADM

## 2023-01-19 RX ORDER — ONDANSETRON 4 MG/1
4 TABLET, ORALLY DISINTEGRATING ORAL EVERY 8 HOURS PRN
Status: DISCONTINUED | OUTPATIENT
Start: 2023-01-19 | End: 2023-01-21 | Stop reason: HOSPADM

## 2023-01-19 RX ORDER — SODIUM CHLORIDE 9 MG/ML
INJECTION, SOLUTION INTRAVENOUS PRN
Status: DISCONTINUED | OUTPATIENT
Start: 2023-01-19 | End: 2023-01-21 | Stop reason: HOSPADM

## 2023-01-19 RX ADMIN — METHADONE HYDROCHLORIDE 30 MG: 10 TABLET ORAL at 14:58

## 2023-01-19 RX ADMIN — VANCOMYCIN HYDROCHLORIDE 1000 MG: 1 INJECTION, POWDER, LYOPHILIZED, FOR SOLUTION INTRAVENOUS at 19:52

## 2023-01-19 RX ADMIN — CEFEPIME 2000 MG: 2 INJECTION, POWDER, FOR SOLUTION INTRAVENOUS at 18:50

## 2023-01-19 RX ADMIN — CEFEPIME 2000 MG: 2 INJECTION, POWDER, FOR SOLUTION INTRAVENOUS at 05:45

## 2023-01-19 RX ADMIN — VANCOMYCIN HYDROCHLORIDE 1250 MG: 1 INJECTION, POWDER, LYOPHILIZED, FOR SOLUTION INTRAVENOUS at 06:24

## 2023-01-19 ASSESSMENT — PAIN DESCRIPTION - DESCRIPTORS: DESCRIPTORS: DISCOMFORT;TIGHTNESS

## 2023-01-19 ASSESSMENT — PAIN DESCRIPTION - ORIENTATION: ORIENTATION: RIGHT;LEFT

## 2023-01-19 ASSESSMENT — LIFESTYLE VARIABLES
HOW OFTEN DO YOU HAVE A DRINK CONTAINING ALCOHOL: NEVER
HOW MANY STANDARD DRINKS CONTAINING ALCOHOL DO YOU HAVE ON A TYPICAL DAY: PATIENT DOES NOT DRINK

## 2023-01-19 ASSESSMENT — PAIN - FUNCTIONAL ASSESSMENT
PAIN_FUNCTIONAL_ASSESSMENT: 0-10

## 2023-01-19 ASSESSMENT — PAIN SCALES - GENERAL
PAINLEVEL_OUTOF10: 3
PAINLEVEL_OUTOF10: 5

## 2023-01-19 ASSESSMENT — PAIN DESCRIPTION - LOCATION: LOCATION: HAND;ARM

## 2023-01-19 NOTE — CONSULTS
Infectious Diseases Inpatient Consult Note      Reason for Consult:  Bi lateral upper extremity large open wounds from IVDA and cellulitis      Requesting Physician:  Sebastián Castorena      Primary Care Physician:  No primary care provider on file. History Obtained From:  [facility] and Patient     CHIEF COMPLAINT:     Chief Complaint   Patient presents with    Wound Infection     Chronic \"iv drug usage\" per patient caused wounds and continues to inject drugs into wound sites         HISTORY OF PRESENT ILLNESS:  28 y.o. man with a history of ADHD, depression, IV drug abuse, hemorrhoids, hepatitis C, heroin abuse, MRSA infection in the past admitted to hospital secondary to bilateral upper extremity open large wound with ongoing drainage and cellulitis secondary to ongoing IVDA. Patient was admitted back in November 2022, with similar presentation wound culture positive for MRSA in the time patient was discharged home on oral antibiotics. Patient indicated he did improve with antibiotics , but unfortunately secondary to ongoing IV drug abuse, wounds have flared up. Labs indicate ALT at 209 AST 71 lipase 10 WBC 5.7 hemoglobin 11.8, x-ray right hand with dorsal soft tissue swelling with the soft tissue defect no osseous abnormality. Given the ongoing need for IV antibiotics we are consulted for recommendations  Location :   Bilateral, upper extremity pain and swelling  Quality : Aching and burning      Severity : 8/10  Duration : Months  Timing : Intermittent  Context :  In the context of IV drug abuse and MRSA infection  Modifying factors : None  Associated signs and symptoms: Swelling, drainage, ulceration        Past Medical History:    Past Medical History:   Diagnosis Date    ADHD (attention deficit hyperactivity disorder)     Cyst     testicles    Depression     Drug abuse, IV ([facility] [address]     heroine    Hemorrhoids     Hepatitis C     Heroin use        Past Surgical History:    Past Surgical History:   Procedure Laterality Date    NASAL SEPTUM SURGERY      TONSILLECTOMY      TYMPANOSTOMY TUBE PLACEMENT         Current Medications:    No outpatient medications have been marked as taking for the 1/19/23 encounter [facility] Encounter).        Allergies:  Clindamycin, Clindamycin/lincomycin, and Azithromycin    Immunizations :   Immunization History   Administered Date(s) Administered    COVID-19, J&J, (age 18y+), IM, 0.5 mL 04/02/2021    Tdap (Boostrix, Adacel) 08/27/2021         Social History:    Social History     Tobacco Use    Smoking status: Every Day     Packs/day: 0.50     Years: 6.00     Pack years: 3.00     Types: Cigarettes    Smokeless tobacco: Current     Types: Chew   Vaping Use    Vaping Use: Never used   Substance Use Topics    Alcohol use: Not Currently     Comment: social    Drug use: Yes     Frequency: 7.0 times per week     Types: Marijuana (Jt Bile), IV, Opiates      Comment: fenanyl IV     Social History     Tobacco Use   Smoking Status Every Day    Packs/day: 0.50    Years: 6.00    Pack years: 3.00    Types: Cigarettes   Smokeless Tobacco Current    Types: Chew      Family History   Problem Relation Age of Onset    Diabetes Mother     Depression Mother     Substance Abuse Mother     High Blood Pressure Maternal Grandmother     Mental Illness Father     Substance Abuse Brother          REVIEW OF SYSTEMS:      Constitutional:  negative for fevers, chills, night sweats  Eyes:  negative for blurred vision, eye discharge, visual disturbance   HEENT:  negative for hearing loss, ear drainage,nasal congestion  Respiratory:  negative for cough, shortness of breath or hemoptysis   Cardiovascular:  negative for chest pain, palpitations, syncope  Gastrointestinal:  negative for nausea, vomiting, diarrhea, constipation, abdominal pain  Genitourinary:  negative for frequency, dysuria, urinary incontinence, hematuria  Hematologic/Lymphatic:  negative for easy bruising, bleeding and lymphadenopathy  Allergic/Immunologic: negative for recurrent infections, angioedema, anaphylaxis   Endocrine:  negative for weight changes, polyuria, polydipsia and polyphagia  Musculoskeletal:  Both Hand dorsum open wound + drainage+   pain, swelling, decreased range of motion  Integumentary: No rashes, skin lesions  Neurological:  negative for headaches, slurred speech, unilateral weakness  Psychiatric: negative for hallucinations,confusion,agitation.      PHYSICAL EXAM:      Vitals:    /68   Pulse 76   Temp 98.3 °F (36.8 °C) (Oral)   Resp 17   Ht 5' 10\" (1.778 m)   Wt 140 lb (63.5 kg)   SpO2 96%   BMI 20.09 kg/m²     General Appearance: alert,in some acute distress, ++  pallor, no icterus cachexia+ poor dentition + on going needle tracks+ poor skin hygiene   Skin: warm and dry, no rash or erythema  Head: normocephalic and atraumatic  Eyes: pupils equal, round, and reactive to light, conjunctivae normal  ENT: tympanic membrane, external ear and ear canal normal bilaterally, nose without deformity, nasal mucosa and turbinates normal without polyps  Neck: supple and non-tender without mass, no thyromegaly  no cervical lymphadenopathy  Pulmonary/Chest: clear to auscultation bilaterally- no wheezes, rales or rhonchi, normal air movement, no respiratory distress  Cardiovascular: normal rate, regular rhythm, normal S1 and S2, no murmurs, rubs, clicks, or gallops, no carotid bruits  Abdomen: soft, non-tender, non-distended, normal bowel sounds, no masses or organomegaly  Extremities: no cyanosis, clubbing or edema  Musculoskeletal: normal range of motion, no joint swelling, deformity or tenderness  Integumentary: No rashes, no abnormal skin lesions, no petechiae  Neurologic: reflexes normal and symmetric, no cranial nerve deficit   Lines: IV    Both hands dorsum large area of tissue loss from IVDA and ulceration with drainage + and local cellulitis and Left fore arm area large ulcer+         DATA:    CBC:   Lab Results   Component Value Date WBC 5.7 01/19/2023    HGB 11.8 (L) 01/19/2023    HCT 35.2 (L) 01/19/2023    MCV 77.0 (L) 01/19/2023     01/19/2023     RENAL:   Lab Results   Component Value Date    CREATININE 0.8 (L) 01/19/2023    BUN 16 01/19/2023     01/19/2023    K 3.7 01/19/2023     01/19/2023    CO2 27 01/19/2023     SED RATE:   Lab Results   Component Value Date/Time    SEDRATE 13 12/23/2021 02:01 PM     CK:   Lab Results   Component Value Date/Time    CKTOTAL 737 09/18/2021 04:57 AM     CRP:   Lab Results   Component Value Date/Time    CRP 5.5 04/10/2022 10:16 PM     Hepatic Function Panel:   Lab Results   Component Value Date/Time    ALKPHOS 141 01/19/2023 06:15 AM     01/19/2023 06:15 AM    AST 71 01/19/2023 06:15 AM    PROT 7.5 01/19/2023 06:15 AM    PROT 8.1 10/06/2012 01:20 PM    BILITOT 0.4 01/19/2023 06:15 AM    BILIDIR <0.2 04/10/2022 10:16 PM    IBILI see below 04/10/2022 10:16 PM    LABALBU 4.3 01/19/2023 06:15 AM     UA:  Lab Results   Component Value Date/Time    COLORU YELLOW 10/11/2021 06:00 PM    CLARITYU Clear 10/11/2021 06:00 PM    GLUCOSEU Negative 10/11/2021 06:00 PM    BILIRUBINUR Negative 10/11/2021 06:00 PM    KETUA Negative 10/11/2021 06:00 PM    SPECGRAV 1.014 10/11/2021 06:00 PM    BLOODU Negative 10/11/2021 06:00 PM    PHUR 8.5 10/11/2021 06:00 PM    PHUR 8.5 10/11/2021 06:00 PM    PROTEINU Negative 10/11/2021 06:00 PM    UROBILINOGEN 4.0 10/11/2021 06:00 PM    NITRU Negative 10/11/2021 06:00 PM    LEUKOCYTESUR TRACE 10/11/2021 06:00 PM    LABMICR YES 10/11/2021 06:00 PM    URINETYPE NotGiven 10/11/2021 06:00 PM      Urine Microscopic:   Lab Results   Component Value Date/Time    BACTERIA 4+ 10/11/2021 06:00 PM    HYALCAST 0 10/11/2021 06:00 PM    WBCUA 8 10/11/2021 06:00 PM    RBCUA 0 10/11/2021 06:00 PM    EPIU 0 10/11/2021 06:00 PM     Urine Reflex to Culture:   Lab Results   Component Value Date/Time    URRFLXCULT Not Indicated 10/11/2021 06:00 PM         MICRO: cultures reviewed and updated by me   Susceptibility    Staph aureus mrsa (1)    Antibiotic Interpretation Microscan  Method Status    ceFAZolin Resistant 16 mcg/mL BACTERIAL SUSCEPTIBILITY PANEL BY LIVIA     clindamycin Resistant <=0.5 mcg/mL BACTERIAL SUSCEPTIBILITY PANEL BY LIVIA     DAPTOmycin Sensitive <=0.5 mcg/mL BACTERIAL SUSCEPTIBILITY PANEL BY LIVIA     erythromycin Resistant >4 mcg/mL BACTERIAL SUSCEPTIBILITY PANEL BY LIVIA     linezolid Sensitive 2 mcg/mL BACTERIAL SUSCEPTIBILITY PANEL BY LIVIA     oxacillin Resistant >2 mcg/mL BACTERIAL SUSCEPTIBILITY PANEL BY LIVIA     tetracycline Sensitive <=4 mcg/mL BACTERIAL SUSCEPTIBILITY PANEL BY LIVIA     trimethoprim-sulfamethoxazole Sensitive <=0.5/9.5 mcg/mL BACTERIAL SUSCEPTIBILITY PANEL BY LIVIA     vancomycin Sensitive 1 mcg/mL BACTERIAL SUSCEPTIBILITY PANEL BY LIVIA        Narrative  Performed by: [facility]  ORDER#: [id number]                          ORDERED BY: Jerome Shearer   SOURCE: Wound Left Forearm                 COLLECTED:  11/11/22 19:00   ANTIBIOTICS AT [facility]A [facility]LENA.:                      RECEIVED :  11/11/22 20:22   CALL  Herrera  [facility] tel. [phone],        Blood Culture:   Lab Results   Component Value Date/Time    BC No Growth after 4 days of incubation. 11/11/2022 01:25 AM    BLOODCULT2 No Growth after 4 days of incubation. 11/12/2022 01:05 PM       Viral Culture:    Lab Results   Component Value Date/Time    COVID19 Not Detected 04/10/2022 11:23 PM     Urine Culture: No results for input(s): LABURIN in the last 72 hours.     Scheduled Meds:   cefepime  2,000 mg IntraVENous Q12H    sodium chloride flush  5-40 mL IntraVENous 2 times per day    [START ON 1/20/2023] enoxaparin  40 mg SubCUTAneous Daily    methadone  30 mg Oral Daily    nicotine  1 patch TransDERmal Daily    vancomycin  1,000 mg IntraVENous Q12H    vancomycin (VANCOCIN) intermittent dosing (placeholder)   Other RX Placeholder       Continuous Infusions:   sodium chloride         PRN Meds:  sodium chloride flush, sodium chloride, ondansetron **OR** ondansetron, polyethylene glycol, acetaminophen **OR** acetaminophen    Imaging:   XR HAND RIGHT (MIN 3 VIEWS)   Final Result   Dorsal soft tissue swelling with soft tissue defect. XR HAND LEFT (MIN 3 VIEWS)   Final Result   Soft tissue swelling with dorsal soft tissue defect and soft tissue gas. XR RADIUS ULNA LEFT (2 VIEWS)   Final Result   Diffuse soft tissue swelling with a dorsal soft tissue defect and potential   soft tissue gas. All pertinent images and reports for the current Hospitalization were reviewed by me. IMPRESSION:    Patient Active Problem List   Diagnosis    Substance induced mood disorder (HCC)    Opiate addiction (HCC)    Encephalopathy    Acute electrocardiogram changes    Drug use    AMS (altered mental status)    Syncope    Syncope and collapse    Cellulitis    Left arm cellulitis    Hypotension    IVDU (intravenous drug user)    Infected ulcer of skin, with fat layer exposed ([address]    Multiple open wounds    Hep C w/o coma, chronic (HCC)     Bi lateral upper extremity cellulitis  Large open area of wound from IVDA on the Left fore arm area  Both hands dorsum ulcer and tissue loss from IVDA  ADHD  Polysubstance abuse  Depression  Hep C+Ve  Lft ELEVATION   MRSA infection in the past  X ray no osteomyelitis   IVDA    Given the on going skin changes and ulceration with drainage will cont IV abx and needs local wound care and may need debridement and wound care follow up but given his IVDA and Poor social situation follow ups may be difficult    Ulcerations secondary to on going IVDA and chemical skin injury and secondary infection             Labs, Microbiology, Radiology and pertinent results from current hospitalization and care every where were reviewed by me as a part of the consultation. PLAN :  Cont IV Vancomycin x  1 gm Q 12 hrs  Cont IV Cefepime x 2 gm Q 12 hrs  Lft elevation from Hep C?   HIV screen   Wound cx  Local wound care   Will be able to choose oral abx once looking better      Discussed with patient/Family and Nursing   Risk of Complications/Morbidity: High      Illness(es)/ Infection present that pose threat to bodily function. There is potential for severe exacerbation of infection/side effects of treatment. Therapy requires intensive monitoring for antimicrobial agent toxicity. Thanks for allowing me to participate in your patient's care please call me with any questions or concerns.     Dr. Paula Manuel MD  [address] [place] [address] Physician  Phone: [phone]   Fax : [phone]

## 2023-01-19 NOTE — CONSULTS
Clinical Pharmacy Note  Vancomycin Consult    Pharmacy consult received for one-time dose of vancomycin in the Emergency Department per Dr. Jackson Austin. Ht Readings from Last 1 Encounters:   01/19/23 5' 10\" (1.778 m)        Wt Readings from Last 1 Encounters:   01/19/23 140 lb (63.5 kg)         Assessment/Plan:  Vancomycin 1250 mg x 1 in ED. If Vancomycin is to continue on admission and pharmacy is to manage dosing, please re-consult with admission orders.     Diego Gasca, PharmD  1/19/2023 5:45 AM

## 2023-01-19 NOTE — ED NOTES
Transferred to [facility] by [facility] with belongings at side. IV site unremarkable.    Pain 5/10     Komal Park RN  01/19/23 [id number]

## 2023-01-19 NOTE — CONSULTS
Clinical Pharmacy Note  Vancomycin Consult    Mack Santillan is a 28 y.o. male ordered Vancomycin for cellulitis; consult received from Dr. Heladio Macario to manage therapy. Also receiving Cefepime. Allergies:  Clindamycin, Clindamycin/lincomycin, and Azithromycin     Temp max:  Temp (24hrs), Av.6 °F (37 °C), Min:98.3 °F (36.8 °C), Max:98.8 °F (37.1 °C)      Recent Labs     23  0615   WBC 5.7       Recent Labs     23  0615   BUN 16   CREATININE 0.8*       No intake or output data in the 24 hours ending 23 1439    Culture Results:      Ht Readings from Last 1 Encounters:   23 5' 10\" (1.778 m)        Wt Readings from Last 1 Encounters:   23 140 lb (63.5 kg)         Estimated Creatinine Clearance: 123 mL/min (A) (based on SCr of 0.8 mg/dL (L)). Assessment/Plan:  Day # 1 of vancomycin of 5 day order  Vancomycin 1250mg given in ED at 0630 today  Vancomycin 1000 mg IV every 12 hours ordered to start tonight    Goal -600  Predicted  predicted trough 12.4mg/L      Thank you for the consult.    Ayleen Gonzalez, [address] [place] [address], 2023 2:41 PM

## 2023-01-19 NOTE — CARE COORDINATION
[address] [address] Continence Nurse  Consult Note       NAME:  Kunal Enriquez  MEDICAL RECORD NUMBER:  [id number]  AGE: 28 y.o. GENDER: male  : 1995  TODAY'S DATE:  2023    Subjective   Reason for WOCN Evaluation and Assessment: BUE wounds      Mack López is a 28 y.o. male referred by:   [] Physician  [x] Nursing  [] Other:     Wound Identification:  Wound Type: chronic wound from IVDU  Contributing Factors: non-adherence and substance abuse    Wound History: chronic IV drug use, chronic wounds.  Seen by Driss Garcia last admit, but not able to follow up  Current Wound Care Treatment:  pt tells me he wraps wounds up when he works    Patient Goal of Care:  [x] Wound Healing  [] Odor Control  [] Palliative Care  [] Pain Control   [] Other:         PAST MEDICAL HISTORY        Diagnosis Date    ADHD (attention deficit hyperactivity disorder)     Cyst     testicles    Depression     Drug abuse, IV ([address]     heroine    Hemorrhoids     Hepatitis C     Heroin use        PAST SURGICAL HISTORY    Past Surgical History:   Procedure Laterality Date    NASAL SEPTUM SURGERY      TONSILLECTOMY      TYMPANOSTOMY TUBE PLACEMENT         FAMILY HISTORY    Family History   Problem Relation Age of Onset    Diabetes Mother     Depression Mother     Substance Abuse Mother     High Blood Pressure Maternal Grandmother     Mental Illness Father     Substance Abuse Brother        SOCIAL HISTORY    Social History     Tobacco Use    Smoking status: Every Day     Packs/day: 0.50     Years: 6.00     Pack years: 3.00     Types: Cigarettes    Smokeless tobacco: Current     Types: Chew   Vaping Use    Vaping Use: Never used   Substance Use Topics    Alcohol use: Not Currently     Comment: social    Drug use: Yes     Frequency: 7.0 times per week     Types: Marijuana (Weed), IV, Opiates      Comment: fenanyl IV       ALLERGIES    Allergies   Allergen Reactions    Clindamycin     Clindamycin/Lincomycin Itching    Azithromycin Itching MEDICATIONS    No current facility-administered medications on file prior to encounter. Current Outpatient Medications on File Prior to Encounter   Medication Sig Dispense Refill    methadone (DOLOPHINE) 10 MG/ML solution Take 30 mg by mouth daily. ondansetron (ZOFRAN ODT) 4 MG disintegrating tablet Take 1 tablet by mouth every 8 hours as needed for Nausea 20 tablet 0       Objective    /68   Pulse 76   Temp 98.3 °F (36.8 °C) (Oral)   Resp 16   Ht 5' 10\" (1.778 m)   Wt 140 lb (63.5 kg)   SpO2 96%   BMI 20.09 kg/m²     LABS:  WBC:    Lab Results   Component Value Date/Time    WBC 5.7 01/19/2023 06:15 AM     H/H:    Lab Results   Component Value Date/Time    HGB 11.8 01/19/2023 06:15 AM    HCT 35.2 01/19/2023 06:15 AM     PTT:    Lab Results   Component Value Date/Time    APTT 34.9 03/20/2021 06:33 AM   [APTT}  PT/INR:    Lab Results   Component Value Date/Time    PROTIME 14.9 09/15/2021 02:00 PM    INR 1.30 09/15/2021 02:00 PM     HgBA1c:  No results found for: LABA1C    Assessment   Thad Risk Score:      Patient Active Problem List   Diagnosis Code    Substance induced mood disorder (HCC) F19.94    Opiate addiction (HCC) F11.20    Encephalopathy G93.40    Acute electrocardiogram changes R94.31    Drug use F19.90    AMS (altered mental status) R41.82    Syncope R55    Syncope and collapse R55    Cellulitis L03.90    Left arm cellulitis L03.114    Hypotension I95.9    IVDU (intravenous drug user) F19.90    Infected ulcer of skin, with fat layer exposed ([facility] [address] L98.492, L08.9    Multiple open wounds T07. XXXA    Hep C w/o coma, chronic (HCC) B18.2       Measurements:      Wound 01/19/23 Hand Right (Active)   Wound Image   01/19/23 1518   Wound Etiology Other 01/19/23 1518   Wound Length (cm) 2 cm 01/19/23 1518   Wound Width (cm) 3 cm 01/19/23 1518   Wound Surface Area (cm^2) 6 cm^2 01/19/23 1518   Drainage Amount None 01/19/23 1518   Odor None 01/19/23 1518   Tawnya-wound Assessment Edematous; Non-blanchable erythema 01/19/23 1518   Margins Defined edges 01/19/23 1518   Number of days: 0         Wound 01/19/23 Arm Left (Active)   Wound Image   01/19/23 1519   Wound Etiology Diabetic 01/19/23 1519   Wound Length (cm) 9 cm 01/19/23 1519   Wound Width (cm) 4 cm 01/19/23 1519   Wound Surface Area (cm^2) 36 cm^2 01/19/23 1519   Wound Assessment Eschar dry;Pink/red 01/19/23 1519   Drainage Amount None 01/19/23 1519   Odor None 01/19/23 1519   Tawnya-wound Assessment Edematous; Non-blanchable erythema 01/19/23 1519   Margins Defined edges 01/19/23 1519   Number of days: 0         Wound 01/19/23 Hand Left (Active)   Wound Image   01/19/23 1520   Wound Etiology Other 01/19/23 1520   Wound Length (cm) 5 cm 01/19/23 1520   Wound Width (cm) 5.5 cm 01/19/23 1520   Wound Surface Area (cm^2) 27.5 cm^2 01/19/23 1520   Drainage Amount None 01/19/23 1520   Odor None 01/19/23 1520   Tawnya-wound Assessment Edematous; Non-blanchable erythema 01/19/23 1520   Margins Defined edges 01/19/23 1520   Number of days: 0      Pt seen. Has multiple BUE wounds from chronic IVDU. Wounds are dry. Pt reports burning, denies pain. Last admit was to do Vashe dressings and follow up with Dr. Rubin Rider. Healed wounds to bilat feet noted. Response to treatment:  Well tolerated by patient. Pain Assessment:  Severity:  0 / 10  Quality of pain: burning    Plan   Plan of Care:    -vashe dressings twice daily to BUE  Specialty Bed Required : No   [] Low Air Loss   [] Pressure Redistribution  [] Fluid Immersion  [] Bariatric  [] Total Pressure Relief  [] Other:     Current Diet: ADULT DIET;  Regular  Dietician consult:  No    Discharge Plan:  Placement for patient upon discharge: home with support    Patient appropriate for Outpatient [address]: Yes    Referrals:  []   [] [facility]  [] Supplies  [] Other    Patient/Caregiver Teaching:  Level of patient/caregiver understanding able to:   [] Indicates understanding [] Needs reinforcement  [] Unsuccessful      [x] Verbal Understanding  [] Demonstrated understanding       [] No evidence of learning  [] Refused teaching         [] N/A       Electronically signed by Karen Perea on 1/19/2023 at 3:23 PM

## 2023-01-19 NOTE — ED PROVIDER NOTES
[address] [facility] ENCOUNTER        Pt Name: Luz Elena Kim  MRN: [id number]  [place] 1995  Date of evaluation: 1/19/2023  Provider: Trenton Paredes MD  PCP: No primary care provider on file. Note Started: 5:58 AM EST 1/19/23    CHIEF COMPLAINT       Chief Complaint   Patient presents with    Wound Infection     Chronic \"iv drug usage\" per patient caused wounds and continues to inject drugs into wound sites       HISTORY OF PRESENT ILLNESS: 1 or more Elements   History From: Patient        Luz Elena Kim is a 28 y.o. male who presents for evaluation of wounds to the bilateral upper extremities. Patient reports an extensive history of IV drug use and has a known history of ulcerating cellulitis and wounds that have been there for multiple months. Patient recently admitted in November. He was seen by infectious disease as well as plastic surgery. States that he was supposed to follow-up with wound care but was lost to follow-up. He reports persistent IV and subcutaneous drug use. Reports open wounds to the left forearm dorsum of the left hand dorsum of the right hand. Denies additional wounds. Denies fevers nausea vomiting or unintentional weight loss. Patient reports that he is currently attempting to abstain from IV drug use. He states he is currently weaning down on methadone to the point where he can enter inpatient rehabilitation facility. States that he last used yesterday. Nursing Notes were all reviewed and agreed with or any disagreements were addressed in the HPI. REVIEW OF SYSTEMS :      Review of Systems    Positives and Pertinent negatives as per HPI.      SURGICAL HISTORY     Past Surgical History:   Procedure Laterality Date    NASAL SEPTUM SURGERY      TONSILLECTOMY      TYMPANOSTOMY TUBE PLACEMENT         CURRENTMEDICATIONS       Previous Medications    METHADONE (DOLOPHINE) 10 MG/ML SOLUTION    Take 100 mg by mouth daily.    ONDANSETRON (ZOFRAN ODT) 4 MG DISINTEGRATING TABLET    Take 1 tablet by mouth every 8 hours as needed for Nausea       ALLERGIES     Clindamycin, Clindamycin/lincomycin, and Azithromycin    FAMILYHISTORY       Family History   Problem Relation Age of Onset    Diabetes Mother     Depression Mother     Substance Abuse Mother     High Blood Pressure Maternal Grandmother     Mental Illness Father     Substance Abuse Brother         SOCIAL HISTORY       Social History     Tobacco Use    Smoking status: Every Day     Packs/day: 0.50     Years: 6.00     Pack years: 3.00     Types: Cigarettes    Smokeless tobacco: Current     Types: Chew   Vaping Use    Vaping Use: Never used   Substance Use Topics    Alcohol use: Not Currently     Comment: social    Drug use: Yes     Frequency: 7.0 times per week     Types: Marijuana ([employer] [employer]), IV, Opiates      Comment: fenanyl IV       SCREENINGS        [place] Coma Scale  Eye Opening: Spontaneous  Best Verbal Response: Oriented  Best Motor Response: Obeys commands  [place] Coma Scale Score: 15                CIWA Assessment  BP: 137/84  Heart Rate: 94           PHYSICAL EXAM  1 or more Elements     ED Triage Vitals [01/19/23 0533]   BP Temp Temp src Heart Rate Resp SpO2 Height Weight   137/84 98.8 °F (37.1 °C) -- 94 20 97 % 5' 10\" (1.778 m) 140 lb (63.5 kg)       Physical Exam  Vitals reviewed. Constitutional:       General: He is not in acute distress. Appearance: He is well-developed. HENT:      Head: Normocephalic and atraumatic. Eyes:      Conjunctiva/sclera: Conjunctivae normal.   Neck:      Trachea: No tracheal deviation. Cardiovascular:      Rate and Rhythm: Normal rate and regular rhythm. Pulmonary:      Effort: Pulmonary effort is normal.      Breath sounds: Normal breath sounds. No wheezing or rales. Abdominal:      General: There is no distension. Palpations: Abdomen is soft. Tenderness: There is no abdominal tenderness.    Musculoskeletal: General: No deformity. Normal range of motion. Cervical back: Normal range of motion. Skin:     General: Skin is warm and dry. Capillary Refill: Capillary refill takes less than 2 seconds. Findings: Lesion present. Neurological:      General: No focal deficit present. Mental Status: He is alert and oriented to person, place, and time. Sensory: No sensory deficit. Motor: No weakness. DIAGNOSTIC RESULTS   LABS:    Labs Reviewed   CBC WITH AUTO DIFFERENTIAL - Abnormal; Notable for the following components:       Result Value    Hemoglobin 11.8 (*)     Hematocrit 35.2 (*)     MCV 77.0 (*)     MCH 25.9 (*)     All other components within normal limits   COMPREHENSIVE METABOLIC PANEL W/ REFLEX TO MG FOR LOW K - Abnormal; Notable for the following components:    Glucose 139 (*)     Creatinine 0.8 (*)     Alkaline Phosphatase 141 (*)      (*)     AST 71 (*)     All other components within normal limits   LIPASE - Abnormal; Notable for the following components:    Lipase 10.0 (*)     All other components within normal limits   BASIC METABOLIC PANEL W/ REFLEX TO MG FOR LOW K   HIV SCREEN       When ordered only abnormal lab results are displayed. All other labs were within normal range or not returned as of this dictation. EKG:     RADIOLOGY:   Non-plain film images such as CT, Ultrasound and MRI are read by the radiologist. Plain radiographic images are visualized and preliminarily interpreted by the ED Provider with the below findings:        Interpretation per the Radiologist below, if available at the time of this note:    Discussed with Radiologist:     XR HAND RIGHT (MIN 3 VIEWS)   Final Result   Dorsal soft tissue swelling with soft tissue defect. XR HAND LEFT (MIN 3 VIEWS)   Final Result   Soft tissue swelling with dorsal soft tissue defect and soft tissue gas.          XR RADIUS ULNA LEFT (2 VIEWS)   Final Result   Diffuse soft tissue swelling with a dorsal soft tissue defect and potential   soft tissue gas. No results found. No results found. PROCEDURES   Unless otherwise noted below, none     Procedures    CRITICAL CARE TIME (.cct)       PAST MEDICAL HISTORY      has a past medical history of ADHD (attention deficit hyperactivity disorder), Cyst, Depression, Drug abuse, IV ([address] Hemorrhoids, Hepatitis C, and Heroin use. EMERGENCY DEPARTMENT COURSE and DIFFERENTIAL DIAGNOSIS/MDM:   Vitals:    Vitals:    01/19/23 0533   BP: 137/84   Pulse: 94   Resp: 20   Temp: 98.8 °F (37.1 °C)   SpO2: 97%   Weight: 140 lb (63.5 kg)   Height: 5' 10\" (1.778 m)       Patient was given the following medications:  Medications   cefepime (MAXIPIME) 2000 mg IVPB minibag (has no administration in time range)   vancomycin (VANCOCIN) 1,250 mg in dextrose 5 % 250 mL IVPB (has no administration in time range)             Is this patient to be included in the SEP-1 Core Measure due to severe sepsis or septic shock? No   Exclusion criteria - the patient is NOT to be included for SEP-1 Core Measure due to:  2+ SIRS criteria are not met    CC/HPI Summary, DDx, ED Course, and Reassessment: Patient presents the ED evaluation of of wounds secondary to IV drug use to the bilateral upper extremities. Did review patient's previous admission and the documented pictures of the wounds previously. They do appear to be of increased depth and size. He does report occasional purulence that forms on the wounds. Currently no active draining. Extremities are neurovascular intact. Patient does appear to have exposed muscle body of the wound on the left forearm. Due to the extent of the patient's wounds informed the patient that he would likely require admission for further medical management evaluation. Patient reported that he was willing to be admitted to the hospital.     IV access was established and patient started on IV antibiotics.   Imaging studies obtained to evaluate for subcutaneous emphysema or signs of necrotizing infection. CONSULTS: (Who and What was discussed)  PHARMACY TO DOSE VANCOMYCIN      Social Determinants : None, Patient has / had difficulty affording medications , and IV drug use    Chronic Conditions:   Past Medical History:   Diagnosis Date    ADHD (attention deficit hyperactivity disorder)     Cyst     testicles    Depression     Drug abuse, IV ([address]     heroine    Hemorrhoids     Hepatitis C     Heroin use          Records Reviewed (source and summary): Patient's multiple previous ED visits as well as previous admissions. Disposition Considerations (include 1 Tests not done, Admit vs D/C, Shared Decision Making, Pt Expectation of Test or Tx.): Consider obtaining more advanced imaging studies that x-ray should be sufficient to evaluate for subcutaneous emphysema. Patient has wounds that that appear to be increasing and size of wound as well as depth and would likely benefit from IV antibiotics and specialist evaluation. I am the Primary Clinician of Record. FINAL IMPRESSION      1. Cellulitis of upper extremity, unspecified laterality    2. Open wound of elbow, forearm, and wrist, with tendon involvement, unspecified laterality, initial encounter    3. IVDU (intravenous drug user)          DISPOSITION/PLAN     DISPOSITION        PATIENT REFERRED TO:  No follow-up provider specified.     DISCHARGE MEDICATIONS:  New Prescriptions    No medications on file       DISCONTINUED MEDICATIONS:  Discontinued Medications    No medications on file              (Please note that portions of this note were completed with a voice recognition program.  Efforts were made to edit the dictations but occasionally words are mis-transcribed.)    Elijah Davila MD (electronically signed)            Elijah Davila MD  01/20/23 5293

## 2023-01-19 NOTE — H&P
Hospital Medicine History & Physical      PCP: No primary care provider on file. Date of Admission: 1/19/2023    Date of Service: Pt seen/examined on 01/19/23   and Admitted to Inpatient     Chief Complaint:  Right arm pain       History Of Present Illness: The patient is a 28 y.o. male who presents to [facility] with worsening pain around lesions on right arm. Patient with a past medical history of IV drug use, hepatitis C, presented to emergency with right arm pain and nonhealing wounds for the past 5 months. Patient uses fentanyl daily, intravenously, 1 g total divided, patient uses clean needles every time however does not clean the area before injecting, patient injects in multiple areas, have been in the process of rehab, follows with the methadone clinic and has been on decreasing doses of methadone, expressed intent to quit. Denies any systemic symptoms, no fever. Patient smokes 1 pack daily for the past 7 years, works in construction, lives with his uncle. Past Medical History:        Diagnosis Date    ADHD (attention deficit hyperactivity disorder)     Cyst     testicles    Depression     Drug abuse, IV ([facility] [address]     heroine    Hemorrhoids     Hepatitis C     Heroin use        Past Surgical History:        Procedure Laterality Date    NASAL SEPTUM SURGERY      TONSILLECTOMY      TYMPANOSTOMY TUBE PLACEMENT         Medications Prior to Admission:    Prior to Admission medications    Medication Sig Start Date End Date Taking? Authorizing Provider   methadone (DOLOPHINE) 10 MG/ML solution Take 30 mg by mouth daily.     Historical Provider, MD   ondansetron (ZOFRAN ODT) 4 MG disintegrating tablet Take 1 tablet by mouth every 8 hours as needed for Nausea 5/12/22   Suki Grant MD       Allergies:  Clindamycin, Clindamycin/lincomycin, and Azithromycin    Social History:  The patient currently lives independently    TOBACCO:   reports that he has been smoking cigarettes. He has a 3.00 pack-year smoking history. His smokeless tobacco use includes chew. ETOH:   reports that he does not currently use alcohol. Family History:  Reviewed in detail and negative for DM, Early CAD, Cancer, CVA. Positive as follows:        Problem Relation Age of Onset    Diabetes Mother     Depression Mother     Substance Abuse Mother     High Blood Pressure Maternal Grandmother     Mental Illness Father     Substance Abuse Brother        REVIEW OF SYSTEMS:   Positive for discharge and as noted in the HPI. All other systems reviewed and negative. PHYSICAL EXAM:    /68   Pulse 76   Temp 98.3 °F (36.8 °C) (Oral)   Resp 16   Ht 5' 10\" (1.778 m)   Wt 140 lb (63.5 kg)   SpO2 96%   BMI 20.09 kg/m²     General appearance: No apparent distress appears stated age and cooperative. HEENT Normal cephalic, atraumatic without obvious deformity. Pupils equal, round, and reactive to light. Extra ocular muscles intact. Conjunctivae/corneas clear. Neck: Supple, No jugular venous distention/bruits. Trachea midline without thyromegaly or adenopathy with full range of motion. Lungs: Clear to auscultation, bilaterally without Rales/Wheezes/Rhonchi with good respiratory effort. Heart: Regular rate and rhythm with Normal S1/S2 without murmurs, rubs or gallops, point of maximum impulse non-displaced  Abdomen: Soft, non-tender or non-distended without rigidity or guarding and positive bowel sounds all four quadrants. Extremities: 2 large skin ulcers noted on the right upper extremity and a small left ulcer noted . skin: Skin color, texture, turgor normal.  No rashes or lesions. Neurologic: Alert and oriented X 3, neurovascularly intact with sensory/motor intact upper extremities/lower extremities, bilaterally.   Cranial nerves: II-XII intact, grossly non-focal.  Mental status: Alert, oriented, thought content appropriate. Capillary Refill: Acceptable  < 3 seconds  Peripheral Pulses: +3 Easily felt, not easily obliterated with pressure      CBC   Recent Labs     01/19/23 0615   WBC 5.7   HGB 11.8*   HCT 35.2*         RENAL  Recent Labs     01/19/23 0615      K 3.7      CO2 27   BUN 16   CREATININE 0.8*     LFT'S  Recent Labs     01/19/23 0615   AST 71*   *   BILITOT 0.4   ALKPHOS 141*     COAG  No results for input(s): INR in the last 72 hours. CARDIAC ENZYMES  No results for input(s): CKTOTAL, CKMB, CKMBINDEX, TROPONINI in the last 72 hours. U/A:    Lab Results   Component Value Date/Time    COLORU YELLOW 10/11/2021 06:00 PM    WBCUA 8 10/11/2021 06:00 PM    RBCUA 0 10/11/2021 06:00 PM    MUCUS 2+ 09/15/2021 02:25 PM    BACTERIA 4+ 10/11/2021 06:00 PM    CLARITYU Clear 10/11/2021 06:00 PM    SPECGRAV 1.014 10/11/2021 06:00 PM    LEUKOCYTESUR TRACE 10/11/2021 06:00 PM    BLOODU Negative 10/11/2021 06:00 PM    GLUCOSEU Negative 10/11/2021 06:00 PM       ABG  No results found for: MRL5SYT, BEART, C1UGHKKW, PHART, THGBART, QTX7RZH, PO2ART, DGY1CQF        Active Hospital Problems    Diagnosis Date Noted    Cellulitis [L03.90] 04/11/2022         PHYSICIANS CERTIFICATION:    I certify that Elsa Kilgore is expected to be hospitalized for more than 2 midnights based on the following assessment and plan:      ASSESSMENT/PLAN:    Right arm cellulitis. Multiple nonhealing ulcers. IV drug use. Patient presented to emergency with right arm pain, patient reports nonhealing ulcer for the past 5 months, 2 large ulcers on the right and one on the left, patient reports serosanguineous occasional discharge, no fever no pus. Plan  -Cover with cefepime/vancomycin for now  -Appreciate infectious disease input, ? True infection versus nonhealing ulcer.   -Continue home dose of methadone.      Smoking  Nicotine patch    DVT Prophylaxis: Lovenox  Diet: ADULT DIET; Regular  Code Status: Full Code  PT/OT Eval Status: baseline     Dispo - 1-2 days        Sally De Leon MD    Thank you No primary care provider on file. for the opportunity to be involved in this patient's care. If you have any questions or concerns please feel free to contact me at [phone].

## 2023-01-19 NOTE — PROGRESS NOTES
Wound dressing applied per orders. Vashe water soaked gauze wrapped with kerlex. Secure message sent to MD for ortho consult based on general surgeries recommendations.      Electronically signed by Waqar Simpson RN on 1/19/2023 at 5:55 PM

## 2023-01-19 NOTE — PROGRESS NOTES
Late entry due to patient care    Patient arrive to PCU room 5260 from [facility] transport. Patient ambulatory and changed into hospital attire and belongings placed in bag into closet. Patient requesting food and this time and his methadone. Secure message to MD and orders placed. Wound care placed orders as well. Patient taking laps around hoang and ambulated safely. PCA at bedside taking afternoon vitals and called RN for critical vitals. BP was low and in the 80s/50s. RN at bedside. Patient more lethargic and answers to voice but not able to stay awake. Secure message to MD for PRN narcan incase needed. Patient reported to PCA he took Fentanyl and Cocaine before going to the hospital. Patient asked if anything was going to be found in his belongings and he denied and refused to let RN look. Patient hooked up to telemonitor ad verified with [facility]. More alert and able to communicate with staff. States his methadone makes him sleepy.    Will continue to monitor  Electronically signed by Phuong Carmen RN on 1/19/2023 at 4:33 PM

## 2023-01-19 NOTE — PLAN OF CARE
Problem: Discharge Planning  Goal: Discharge to home or other facility with appropriate resources  Outcome: Progressing  Flowsheets (Taken 1/19/2023 1516)  Discharge to home or other facility with appropriate resources:   Identify barriers to discharge with patient and caregiver   Arrange for needed discharge resources and transportation as appropriate     Problem: Pain  Goal: Verbalizes/displays adequate comfort level or baseline comfort level  Outcome: Progressing     Problem: Safety - Adult  Goal: Free from fall injury  Outcome: Progressing     Problem: ABCDS Injury Assessment  Goal: Absence of physical injury  Outcome: Progressing

## 2023-01-20 LAB
ANION GAP SERPL CALCULATED.3IONS-SCNC: 11 MMOL/L (ref 3–16)
BUN BLDV-MCNC: 14 MG/DL (ref 7–20)
CALCIUM SERPL-MCNC: 9.1 MG/DL (ref 8.3–10.6)
CHLORIDE BLD-SCNC: 99 MMOL/L (ref 99–110)
CO2: 25 MMOL/L (ref 21–32)
CREAT SERPL-MCNC: 0.7 MG/DL (ref 0.9–1.3)
GFR SERPL CREATININE-BSD FRML MDRD: >60 ML/MIN/{1.73_M2}
GLUCOSE BLD-MCNC: 104 MG/DL (ref 70–99)
HIV AG/AB: NORMAL
HIV ANTIGEN: NORMAL
HIV-1 ANTIBODY: NORMAL
HIV-2 AB: NORMAL
POTASSIUM REFLEX MAGNESIUM: 4.6 MMOL/L (ref 3.5–5.1)
SODIUM BLD-SCNC: 135 MMOL/L (ref 136–145)

## 2023-01-20 PROCEDURE — 87390 HIV-1 AG IA: CPT

## 2023-01-20 PROCEDURE — 2580000003 HC RX 258: Performed by: EMERGENCY MEDICINE

## 2023-01-20 PROCEDURE — 2580000003 HC RX 258: Performed by: STUDENT IN AN ORGANIZED HEALTH CARE EDUCATION/TRAINING PROGRAM

## 2023-01-20 PROCEDURE — 6360000002 HC RX W HCPCS: Performed by: EMERGENCY MEDICINE

## 2023-01-20 PROCEDURE — 6360000002 HC RX W HCPCS: Performed by: STUDENT IN AN ORGANIZED HEALTH CARE EDUCATION/TRAINING PROGRAM

## 2023-01-20 PROCEDURE — 6370000000 HC RX 637 (ALT 250 FOR IP): Performed by: STUDENT IN AN ORGANIZED HEALTH CARE EDUCATION/TRAINING PROGRAM

## 2023-01-20 PROCEDURE — 99221 1ST HOSP IP/OBS SF/LOW 40: CPT | Performed by: NURSE PRACTITIONER

## 2023-01-20 PROCEDURE — 86701 HIV-1ANTIBODY: CPT

## 2023-01-20 PROCEDURE — 99232 SBSQ HOSP IP/OBS MODERATE 35: CPT | Performed by: INTERNAL MEDICINE

## 2023-01-20 PROCEDURE — 80048 BASIC METABOLIC PNL TOTAL CA: CPT

## 2023-01-20 PROCEDURE — 1200000000 HC SEMI PRIVATE

## 2023-01-20 PROCEDURE — 86702 HIV-2 ANTIBODY: CPT

## 2023-01-20 PROCEDURE — 94760 N-INVAS EAR/PLS OXIMETRY 1: CPT

## 2023-01-20 PROCEDURE — 36415 COLL VENOUS BLD VENIPUNCTURE: CPT

## 2023-01-20 RX ADMIN — CEFEPIME 2000 MG: 2 INJECTION, POWDER, FOR SOLUTION INTRAVENOUS at 05:44

## 2023-01-20 RX ADMIN — CEFEPIME 2000 MG: 2 INJECTION, POWDER, FOR SOLUTION INTRAVENOUS at 17:38

## 2023-01-20 RX ADMIN — VANCOMYCIN HYDROCHLORIDE 1000 MG: 1 INJECTION, POWDER, LYOPHILIZED, FOR SOLUTION INTRAVENOUS at 09:01

## 2023-01-20 RX ADMIN — SODIUM CHLORIDE: 9 INJECTION, SOLUTION INTRAVENOUS at 05:43

## 2023-01-20 RX ADMIN — Medication 10 ML: at 20:45

## 2023-01-20 RX ADMIN — VANCOMYCIN HYDROCHLORIDE 1000 MG: 1 INJECTION, POWDER, LYOPHILIZED, FOR SOLUTION INTRAVENOUS at 20:51

## 2023-01-20 RX ADMIN — METHADONE HYDROCHLORIDE 30 MG: 10 TABLET ORAL at 08:48

## 2023-01-20 NOTE — PROGRESS NOTES
Hospitalist Progress Note      PCP: No primary care provider on file. Date of Admission: 1/19/2023    Chief Complaint: Right arm pain     Hospital Course:    28 y.o. male who presents to [facility] with worsening pain around lesions on right arm. Patient with a past medical history of IV drug use, hepatitis C, presented to emergency with right arm pain and nonhealing wounds for the past 5 months. Patient uses fentanyl daily, intravenously, 1 g total divided, patient uses clean needles every time however does not clean the area before injecting, patient injects in multiple areas, have been in the process of rehab, follows with the methadone clinic and has been on decreasing doses of methadone, expressed intent to quit. Denies any systemic symptoms, no fever. Admitted for upper limb non healing ulcers. Subjective:   Denies any new complaints, pleasant. Medications:  Reviewed    Infusion Medications    sodium chloride 25 mL/hr at 01/20/23 0543     Scheduled Medications    cefepime  2,000 mg IntraVENous Q12H    sodium chloride flush  5-40 mL IntraVENous 2 times per day    enoxaparin  40 mg SubCUTAneous Daily    methadone  30 mg Oral Daily    nicotine  1 patch TransDERmal Daily    vancomycin  1,000 mg IntraVENous Q12H    vancomycin (VANCOCIN) intermittent dosing (placeholder)   Other RX Placeholder     PRN Meds: sodium chloride flush, sodium chloride, ondansetron **OR** ondansetron, polyethylene glycol, acetaminophen **OR** acetaminophen, naloxone      Intake/Output Summary (Last 24 hours) at 1/20/2023 1252  Last data filed at 1/20/2023 1000  Gross per 24 hour   Intake 960 ml   Output --   Net 960 ml       Physical Exam Performed:    BP 98/63   Pulse 57   Temp 98.3 °F (36.8 °C) (Oral)   Resp 15   Ht 5' 10\" (1.778 m)   Wt 137 lb 5.6 oz (62.3 kg)   SpO2 96%   BMI 19.71 kg/m²     General appearance: No apparent distress, appears stated age and cooperative.   HEENT: Pupils equal, round, and reactive to light. Conjunctivae/corneas clear. Neck: Supple, with full range of motion. No jugular venous distention. Trachea midline. Respiratory:  Normal respiratory effort. Clear to auscultation, bilaterally without Rales/Wheezes/Rhonchi. Cardiovascular: Regular rate and rhythm with normal S1/S2 without murmurs, rubs or gallops. Abdomen: Soft, non-tender, non-distended with normal bowel sounds. Musculoskeletal: No clubbing, cyanosis or edema bilaterally. Full range of motion without deformity. Skin: Skin color, texture, turgor normal.  No rashes or lesions. Neurologic:  Neurovascularly intact without any focal sensory/motor deficits. Cranial nerves: II-XII intact, grossly non-focal.  Psychiatric: Alert and oriented, thought content appropriate, normal insight  Capillary Refill: Brisk, 3 seconds, normal   Peripheral Pulses: +2 palpable, equal bilaterally       Labs:   Recent Labs     01/19/23  0615   WBC 5.7   HGB 11.8*   HCT 35.2*        Recent Labs     01/19/23 0615 01/20/23  0551    135*   K 3.7 4.6    99   CO2 27 25   BUN 16 14   CREATININE 0.8* 0.7*   CALCIUM 9.6 9.1     Recent Labs     01/19/23  0615   AST 71*   *   BILITOT 0.4   ALKPHOS 141*     No results for input(s): INR in the last 72 hours. No results for input(s): Jessica Pace in the last 72 hours. Urinalysis:      Lab Results   Component Value Date/Time    NITRU Negative 10/11/2021 06:00 PM    WBCUA 8 10/11/2021 06:00 PM    BACTERIA 4+ 10/11/2021 06:00 PM    RBCUA 0 10/11/2021 06:00 PM    BLOODU Negative 10/11/2021 06:00 PM    SPECGRAV 1.014 10/11/2021 06:00 PM    GLUCOSEU Negative 10/11/2021 06:00 PM       Radiology:  XR HAND RIGHT (MIN 3 VIEWS)   Final Result   Dorsal soft tissue swelling with soft tissue defect. XR HAND LEFT (MIN 3 VIEWS)   Final Result   Soft tissue swelling with dorsal soft tissue defect and soft tissue gas.          XR RADIUS ULNA LEFT (2 VIEWS) Final Result   Diffuse soft tissue swelling with a dorsal soft tissue defect and potential   soft tissue gas. PHARMACY TO DOSE VANCOMYCIN  IP CONSULT TO HOSPITALIST  IP CONSULT TO PHARMACY  IP CONSULT TO INFECTIOUS DISEASES  PHARMACY TO DOSE VANCOMYCIN  IP CONSULT TO SOCIAL WORK  IP CONSULT TO GENERAL SURGERY  IP CONSULT TO ORTHOPEDIC SURGERY    Assessment/Plan:    Active Hospital Problems    Diagnosis     Open wound of right hand except fingers with tendon involvement [S61.401A, S66.901A]      Priority: Medium    MRSA (methicillin resistant Staphylococcus aureus) infection [A49.02]      Priority: Medium    LFT elevation [R79.89]      Priority: Medium    Intravenous drug abuse ([facility] [address] [F19.10]      Priority: Medium    Chronic skin ulcer with fat layer exposed ([facility] [address] [L98.492]      Priority: Medium    Open wound [T14. 8XXA]      Priority: Medium    Cellulitis [L03.90]           Right arm cellulitis. Multiple nonhealing ulcers. IV drug use. Patient presented to emergency with right arm pain, patient reports nonhealing ulcer for the past 5 months, 2 large ulcers on the right and one on the left, patient reports serosanguineous occasional discharge, no fever no pus. Seen by infectious disease and orthopedic surgery, no role for intervention at the time being, patient may need skin graft once his wounds had cleared up, will be followed with wound care as outpatient  Plan  -Cover with cefepime/vancomycin for now  -Appreciate infectious disease input. Appreciate wound care . -Continue home dose of methadone. Transaminitis  Mild with , AST 71. Will repeat again tomorrow    Smoking  Nicotine patch    DVT Prophylaxis: lovenox  Diet: ADULT DIET;  Regular  Code Status: Full Code  PT/OT Eval Status: baseline     Dispo - pending clinical improvement        Nando Awad MD

## 2023-01-20 NOTE — CONSULTS
[facility] Orthopedic Surgery  Consult Note    This patient is seen in consultation at the request of Dr Heladio Macario    Reason for Consult:  bilateral forearm wounds, IVDA    CHIEF COMPLAINT:  bilateral arm pain    History Obtained From:  patient, electronic medical record    HISTORY OF PRESENT ILLNESS:    The patient is a 28 y.o. male who presents with bilateral arm pain. He admits to IVDA> States there is a drug called \"rainbow\" that seems to eat the skin. States this started a few weeks ago. He is alert and oriented in no distress in bed. . Pain is described in right and left hand and left forearm and with the intensity of moderate. Pain is described as burning. Discomfort is intermittent. He has not other complaints.      Past Medical History:        Diagnosis Date    ADHD (attention deficit hyperactivity disorder)     Cyst     testicles    Depression     Drug abuse, IV ([address]     heroine    Hemorrhoids     Hepatitis C     Heroin use        Past Surgical History:        Procedure Laterality Date    NASAL SEPTUM SURGERY      TONSILLECTOMY      TYMPANOSTOMY TUBE PLACEMENT         Social History     Tobacco Use    Smoking status: Every Day     Packs/day: 0.50     Years: 6.00     Pack years: 3.00     Types: Cigarettes    Smokeless tobacco: Current     Types: Chew   Substance Use Topics    Alcohol use: Not Currently     Comment: social       Family History   Problem Relation Age of Onset    Diabetes Mother     Depression Mother     Substance Abuse Mother     High Blood Pressure Maternal Grandmother     Mental Illness Father     Substance Abuse Brother            Current Medications:   Current Facility-Administered Medications: cefepime (MAXIPIME) 2000 mg IVPB extended (mini-bag), 2,000 mg, IntraVENous, Q12H  sodium chloride flush 0.9 % injection 5-40 mL, 5-40 mL, IntraVENous, 2 times per day  sodium chloride flush 0.9 % injection 5-40 mL, 5-40 mL, IntraVENous, PRN  0.9 % sodium chloride infusion, , IntraVENous, PRN  enoxaparin (LOVENOX) injection 40 mg, 40 mg, SubCUTAneous, Daily  ondansetron (ZOFRAN-ODT) disintegrating tablet 4 mg, 4 mg, Oral, Q8H PRN **OR** ondansetron (ZOFRAN) injection 4 mg, 4 mg, IntraVENous, Q6H PRN  polyethylene glycol (GLYCOLAX) packet 17 g, 17 g, Oral, Daily PRN  acetaminophen (TYLENOL) tablet 650 mg, 650 mg, Oral, Q6H PRN **OR** acetaminophen (TYLENOL) suppository 650 mg, 650 mg, Rectal, Q6H PRN  methadone (DOLOPHINE) tablet 30 mg, 30 mg, Oral, Daily  nicotine (NICODERM CQ) 14 MG/24HR 1 patch, 1 patch, TransDERmal, Daily  vancomycin 1000 mg IVPB in 250 mL D5W addavial, 1,000 mg, IntraVENous, Q12H  vancomycin (VANCOCIN) intermittent dosing (placeholder), , Other, RX Placeholder  naloxone (NARCAN) injection 0.4 mg, 0.4 mg, IntraVENous, PRN  Allergies:  ALLERGY@    REVIEW OF SYSTEMS:    CONSTITUTIONAL:  negative for  fevers, chills, and fatigue  MUSCULOSKELETAL:  positive for  myalgias, arthralgias, and pain  All other ROS reviewed in chart or with patient or family and are grossly negative. PHYSICAL EXAM:    VITALS:  BP (!) 101/56   Pulse 55   Temp 97.5 °F (36.4 °C) (Oral)   Resp 12   Ht 5' 10\" (1.778 m)   Wt 137 lb 5.6 oz (62.3 kg)   SpO2 98%   BMI 19.71 kg/m²     MUSCULOSKELETAL:  Dorsum of bilaterals hands with full thickness skin loss. Left forearm also with full thickness skin loss. See photos. Bilateral wrist with intact flex/ext and hand grasp and extension and thumbs up Minimal swelling bilateral arms and hands noted.                  NEUROLOGIC:   Sensory:    Touch:                     Right Upper Extremity:  normal                   Left Upper Extremity:  normal                  Skin warm and dry  Resp deep and easy  Abdomen soft and round  Pulse is with regular rate and rhythm    DATA:    CBC:   Lab Results   Component Value Date/Time    WBC 5.7 01/19/2023 06:15 AM    RBC 4.57 01/19/2023 06:15 AM    HGB 11.8 01/19/2023 06:15 AM    HCT 35.2 01/19/2023 06:15 AM    MCV 77.0 01/19/2023 06:15 AM    MCH 25.9 01/19/2023 06:15 AM    MCHC 33.6 01/19/2023 06:15 AM    RDW 14.0 01/19/2023 06:15 AM     01/19/2023 06:15 AM    MPV 7.5 01/19/2023 06:15 AM     WBC:    Lab Results   Component Value Date/Time    WBC 5.7 01/19/2023 06:15 AM     PT/INR:    Lab Results   Component Value Date/Time    PROTIME 14.9 09/15/2021 02:00 PM    INR 1.30 09/15/2021 02:00 PM     PTT:    Lab Results   Component Value Date/Time    APTT 34.9 03/20/2021 06:33 AM   [APTT  Radiology Review:    Narrative   EXAMINATION:   THREE XRAY VIEWS OF THE RIGHT HAND       1/19/2023 5:47 am       COMPARISON:   None. HISTORY:   ORDERING SYSTEM PROVIDED HISTORY: wound   TECHNOLOGIST PROVIDED HISTORY:   Reason for exam:->wound   Reason for Exam: Wound   Additional signs and symptoms: Wound infection - Chronic \"iv drug usage\" per   patient caused wounds and continues to inject drugs into wound sites   Relevant Medical/Surgical History: Current every day smoker & chew user. Hx of IV drug abuse, Hep-c, & heroin use. No hx of any relevant surgeries. FINDINGS:   There is no acute osseous abnormality. The joint spaces are maintained. There is dorsal soft tissue swelling with soft tissue defect. Impression   Dorsal soft tissue swelling with soft tissue defect. Narrative   EXAMINATION:   THREE XRAY VIEWS OF THE LEFT HAND       1/19/2023 5:47 am       COMPARISON:   Left hand radiographs performed 06/24/2000. HISTORY:   ORDERING SYSTEM PROVIDED HISTORY: wound   TECHNOLOGIST PROVIDED HISTORY:   Reason for exam:->wound   Reason for Exam: Wound   Additional signs and symptoms: Wound infection - Chronic \"iv drug usage\" per   patient caused wounds and continues to inject drugs into wound sites   Relevant Medical/Surgical History: Current every day smoker & chew user. Hx of IV drug abuse, Hep-c, & heroin use. No hx of any relevant surgeries. FINDINGS:   There is no acute osseous abnormality. The joint spaces are maintained. There is diffuse soft tissue swelling with dorsal skin deformity and adjacent   soft tissue gas. Impression   Soft tissue swelling with dorsal soft tissue defect and soft tissue gas. IMPRESSION/RECOMMENDATIONS:  IVDA  Bilateral hand wounds  Left forearm wound  Agree with plan for Vashe soaked dressings bid. When wounds appear healthy will need skin graft. No indication for surgical intervention at this time. Pt can followup with wound care as outpatient. Discussed with Dr Adela Boone, Will sign off.        RACHID Crisostomo - CNP  1/20/2023  11:58 AM

## 2023-01-20 NOTE — PROGRESS NOTES
Pt refused dressing changes. States they were \"already done. \" Electronically signed by Diamond Vieira RN on 1/20/2023 at 12:44 AM

## 2023-01-20 NOTE — PLAN OF CARE
Problem: Discharge Planning  Goal: Discharge to home or other facility with appropriate resources  Outcome: Progressing  Flowsheets (Taken 1/19/2023 2201 by Elisabeth Gatica RN)  Discharge to home or other facility with appropriate resources: Identify barriers to discharge with patient and caregiver     Problem: Pain  Goal: Verbalizes/displays adequate comfort level or baseline comfort level  Outcome: Progressing     Problem: Safety - Adult  Goal: Free from fall injury  Outcome: Progressing     Problem: ABCDS Injury Assessment  Goal: Absence of physical injury  Outcome: Progressing

## 2023-01-20 NOTE — CARE COORDINATION
Spoke with general surg NP. Photos were reviewed. States consult needs to go to ortho. RN updated.  Message sent to hospitalist. Tommy Patterson, MSN, RN, [facility]

## 2023-01-20 NOTE — PROGRESS NOTES
4 Eyes Skin Assessment     NAME:  Mack Delacruz  YOB: 1995  MEDICAL RECORD NUMBER:  [id number]    The patient is being assessed for  Admission    I agree that One RN have performed a thorough Head to Toe Skin Assessment on the patient. ALL assessment sites listed below have been assessed. Areas assessed by both nurses:    Head, Face, Ears, Shoulders, Back, Chest, Arms, Elbows, Hands, Sacrum. Buttock, Coccyx, Ischium, and Legs. Feet and Heels        Does the Patient have a Wound? Yes wound(s) were present on assessment.  LDA wound assessment was Initiated and completed by RN       Thad Prevention initiated by RN: Yes   Wound Care Orders initiated by RN: Yes    Pressure Injury (Stage 3,4, Unstageable, DTI, NWPT, and Complex wounds) if present place referral order by RN under : Yes    New and Established Ostomies, if present place, referral order under : NA      Nurse 1 eSignature: Electronically signed by Jesús Fernandez RN on 1/19/23 at 8:08 PM EST    **SHARE this note so that the co-signing nurse is able to place an eSignature**    Nurse 2 eSignature: Electronically signed by Mariela Mcdowell RN on 1/19/23 at 10:21 PM EST

## 2023-01-20 NOTE — PROGRESS NOTES
Infectious Diseases Inpatient Progress note       CHIEF COMPLAINT:    IVDA  Left fore arm ulcer  Open wound both hands dorsum   Cellulitis  Hep C+ve      HISTORY OF PRESENT ILLNESS:  28 y.o. man with a history of ADHD, depression, IV drug abuse, hemorrhoids, hepatitis C, heroin abuse, MRSA infection in the past admitted to hospital secondary to bilateral upper extremity open large wound with ongoing drainage and cellulitis secondary to ongoing IVDA. Patient was admitted back in November 2022, with similar presentation wound culture positive for MRSA in the time patient was discharged home on oral antibiotics. Patient indicated he did improve with antibiotics , but unfortunately secondary to ongoing IV drug abuse, wounds have flared up. Labs indicate ALT at 209 AST 71 lipase 10 WBC 5.7 hemoglobin 11.8, x-ray right hand with dorsal soft tissue swelling with the soft tissue defect no osseous abnormality. Given the ongoing need for IV antibiotics we are consulted for recommendations  Location :   Bilateral, upper extremity pain and swelling  Quality : Aching and burning      Severity : 8/10  Duration : Months  Timing : Intermittent  Context : In the context of IV drug abuse and MRSA infection  Modifying factors : None  Associated signs and symptoms: Swelling, drainage, ulceration    Interval History  : Tolerating IV antibiotic therapy okay ongoing drainage from the left hand dressing is intact no tenderness.   Negative, will request wound culture with any drainage    Past Medical History:    Past Medical History:   Diagnosis Date    ADHD (attention deficit hyperactivity disorder)     Cyst     testicles    Depression     Drug abuse, IV ([address]     heroine    Hemorrhoids     Hepatitis C     Heroin use        Past Surgical History:    Past Surgical History:   Procedure Laterality Date    NASAL SEPTUM SURGERY      TONSILLECTOMY      TYMPANOSTOMY TUBE PLACEMENT         Current Medications:    No outpatient medications have been marked as taking for the 1/19/23 encounter [facility] Encounter).        Allergies:  Clindamycin, Clindamycin/lincomycin, and Azithromycin    Immunizations :   Immunization History   Administered Date(s) Administered    COVID-19, J&J, (age 18y+), IM, 0.5 mL 04/02/2021    Tdap (Boostrix, Adacel) 08/27/2021         Social History:    Social History     Tobacco Use    Smoking status: Every Day     Packs/day: 0.50     Years: 6.00     Pack years: 3.00     Types: Cigarettes    Smokeless tobacco: Current     Types: Chew   Vaping Use    Vaping Use: Never used   Substance Use Topics    Alcohol use: Not Currently     Comment: social    Drug use: Yes     Frequency: 7.0 times per week     Types: Marijuana (Daryl Buerger), IV, Opiates      Comment: fenanyl IV     Social History     Tobacco Use   Smoking Status Every Day    Packs/day: 0.50    Years: 6.00    Pack years: 3.00    Types: Cigarettes   Smokeless Tobacco Current    Types: Chew      Family History   Problem Relation Age of Onset    Diabetes Mother     Depression Mother     Substance Abuse Mother     High Blood Pressure Maternal Grandmother     Mental Illness Father     Substance Abuse Brother          REVIEW OF SYSTEMS:      Constitutional:  negative for fevers, chills, night sweats  Eyes:  negative for blurred vision, eye discharge, visual disturbance   HEENT:  negative for hearing loss, ear drainage,nasal congestion  Respiratory:  negative for cough, shortness of breath or hemoptysis   Cardiovascular:  negative for chest pain, palpitations, syncope  Gastrointestinal:  negative for nausea, vomiting, diarrhea, constipation, abdominal pain  Genitourinary:  negative for frequency, dysuria, urinary incontinence, hematuria  Hematologic/Lymphatic:  negative for easy bruising, bleeding and lymphadenopathy  Allergic/Immunologic:  negative for recurrent infections, angioedema, anaphylaxis   Endocrine:  negative for weight changes, polyuria, polydipsia and polyphagia  Musculoskeletal:  Both Hand dorsum open wound + drainage+   pain, swelling, decreased range of motion  Integumentary: No rashes, skin lesions  Neurological:  negative for headaches, slurred speech, unilateral weakness  Psychiatric: negative for hallucinations,confusion,agitation.      PHYSICAL EXAM:      Vitals:    BP (!) 101/56   Pulse 55   Temp 97.5 °F (36.4 °C) (Oral)   Resp 12   Ht 5' 10\" (1.778 m)   Wt 137 lb 5.6 oz (62.3 kg)   SpO2 98%   BMI 19.71 kg/m²     General Appearance: alert,in some acute distress, ++  pallor, no icterus cachexia+ poor dentition + on going needle tracks+ poor skin hygiene   Skin: warm and dry, no rash or erythema  Head: normocephalic and atraumatic  Eyes: pupils equal, round, and reactive to light, conjunctivae normal  ENT: tympanic membrane, external ear and ear canal normal bilaterally, nose without deformity, nasal mucosa and turbinates normal without polyps  Neck: supple and non-tender without mass, no thyromegaly  no cervical lymphadenopathy  Pulmonary/Chest: clear to auscultation bilaterally- no wheezes, rales or rhonchi, normal air movement, no respiratory distress  Cardiovascular: normal rate, regular rhythm, normal S1 and S2, no murmurs, rubs, clicks, or gallops, no carotid bruits  Abdomen: soft, non-tender, non-distended, normal bowel sounds, no masses or organomegaly  Extremities: no cyanosis, clubbing or edema  Musculoskeletal: normal range of motion, no joint swelling, deformity or tenderness  Integumentary: No rashes, no abnormal skin lesions, no petechiae  Neurologic: reflexes normal and symmetric, no cranial nerve deficit   Lines: IV    Both hands dorsum large area of tissue loss from IVDA and ulceration with drainage + and local cellulitis and Left fore arm area large ulcer+         DATA:    CBC:   Lab Results   Component Value Date    WBC 5.7 01/19/2023    HGB 11.8 (L) 01/19/2023    HCT 35.2 (L) 01/19/2023    MCV 77.0 (L) 01/19/2023     01/19/2023     RENAL:   Lab Results   Component Value Date    CREATININE 0.7 (L) 01/20/2023    BUN 14 01/20/2023     (L) 01/20/2023    K 4.6 01/20/2023    CL 99 01/20/2023    CO2 25 01/20/2023     SED RATE:   Lab Results   Component Value Date/Time    SEDRATE 13 12/23/2021 02:01 PM     CK:   Lab Results   Component Value Date/Time    CKTOTAL 737 09/18/2021 04:57 AM     CRP:   Lab Results   Component Value Date/Time    CRP 5.5 04/10/2022 10:16 PM     Hepatic Function Panel:   Lab Results   Component Value Date/Time    ALKPHOS 141 01/19/2023 06:15 AM     01/19/2023 06:15 AM    AST 71 01/19/2023 06:15 AM    PROT 7.5 01/19/2023 06:15 AM    PROT 8.1 10/06/2012 01:20 PM    BILITOT 0.4 01/19/2023 06:15 AM    BILIDIR <0.2 04/10/2022 10:16 PM    IBILI see below 04/10/2022 10:16 PM    LABALBU 4.3 01/19/2023 06:15 AM     UA:  Lab Results   Component Value Date/Time    COLORU YELLOW 10/11/2021 06:00 PM    CLARITYU Clear 10/11/2021 06:00 PM    GLUCOSEU Negative 10/11/2021 06:00 PM    BILIRUBINUR Negative 10/11/2021 06:00 PM    KETUA Negative 10/11/2021 06:00 PM    SPECGRAV 1.014 10/11/2021 06:00 PM    BLOODU Negative 10/11/2021 06:00 PM    PHUR 8.5 10/11/2021 06:00 PM    PHUR 8.5 10/11/2021 06:00 PM    PROTEINU Negative 10/11/2021 06:00 PM    UROBILINOGEN 4.0 10/11/2021 06:00 PM    NITRU Negative 10/11/2021 06:00 PM    LEUKOCYTESUR TRACE 10/11/2021 06:00 PM    LABMICR YES 10/11/2021 06:00 PM    URINETYPE NotGiven 10/11/2021 06:00 PM      Urine Microscopic:   Lab Results   Component Value Date/Time    BACTERIA 4+ 10/11/2021 06:00 PM    HYALCAST 0 10/11/2021 06:00 PM    WBCUA 8 10/11/2021 06:00 PM    RBCUA 0 10/11/2021 06:00 PM    EPIU 0 10/11/2021 06:00 PM     Urine Reflex to Culture:   Lab Results   Component Value Date/Time    URRFLXCULT Not Indicated 10/11/2021 06:00 PM         MICRO: cultures reviewed and updated by me   Susceptibility    Staph aureus mrsa (1)    Antibiotic Interpretation Microscan  Method Status ceFAZolin Resistant 16 mcg/mL BACTERIAL SUSCEPTIBILITY PANEL BY LIVIA     clindamycin Resistant <=0.5 mcg/mL BACTERIAL SUSCEPTIBILITY PANEL BY LIVIA     DAPTOmycin Sensitive <=0.5 mcg/mL BACTERIAL SUSCEPTIBILITY PANEL BY LIVIA     erythromycin Resistant >4 mcg/mL BACTERIAL SUSCEPTIBILITY PANEL BY LIVIA     linezolid Sensitive 2 mcg/mL BACTERIAL SUSCEPTIBILITY PANEL BY LIVIA     oxacillin Resistant >2 mcg/mL BACTERIAL SUSCEPTIBILITY PANEL BY LIVIA     tetracycline Sensitive <=4 mcg/mL BACTERIAL SUSCEPTIBILITY PANEL BY LIVIA     trimethoprim-sulfamethoxazole Sensitive <=0.5/9.5 mcg/mL BACTERIAL SUSCEPTIBILITY PANEL BY LIVIA     vancomycin Sensitive 1 mcg/mL BACTERIAL SUSCEPTIBILITY PANEL BY LIVIA        Narrative  Performed by: [facility]  ORDER#: [id number]                          ORDERED BY: Ish Camargo   SOURCE: Wound Left Forearm                 COLLECTED:  11/11/22 19:00   ANTIBIOTICS AT [facility].:                      RECEIVED :  11/11/22 20:22   CALL  Herrera  [facility] tel. [phone],        Blood Culture:   Lab Results   Component Value Date/Time    BC No Growth after 4 days of incubation. 11/11/2022 01:25 AM    BLOODCULT2 No Growth after 4 days of incubation. 11/12/2022 01:05 PM       Viral Culture:    Lab Results   Component Value Date/Time    COVID19 Not Detected 04/10/2022 11:23 PM     Urine Culture: No results for input(s): LABURIN in the last 72 hours.     Scheduled Meds:   cefepime  2,000 mg IntraVENous Q12H    sodium chloride flush  5-40 mL IntraVENous 2 times per day    enoxaparin  40 mg SubCUTAneous Daily    methadone  30 mg Oral Daily    nicotine  1 patch TransDERmal Daily    vancomycin  1,000 mg IntraVENous Q12H    vancomycin (VANCOCIN) intermittent dosing (placeholder)   Other RX Placeholder       Continuous Infusions:   sodium chloride 25 mL/hr at 01/20/23 0543       PRN Meds:  sodium chloride flush, sodium chloride, ondansetron **OR** ondansetron, polyethylene glycol, acetaminophen **OR** acetaminophen, naloxone    Imaging:   XR HAND RIGHT (MIN 3 VIEWS)   Final Result   Dorsal soft tissue swelling with soft tissue defect. XR HAND LEFT (MIN 3 VIEWS)   Final Result   Soft tissue swelling with dorsal soft tissue defect and soft tissue gas. XR RADIUS ULNA LEFT (2 VIEWS)   Final Result   Diffuse soft tissue swelling with a dorsal soft tissue defect and potential   soft tissue gas. All pertinent images and reports for the current Hospitalization were reviewed by me.     IMPRESSION:    Patient Active Problem List   Diagnosis    Substance induced mood disorder (HCC)    Opiate addiction (HCC)    Encephalopathy    Acute electrocardiogram changes    Drug use    AMS (altered mental status)    Syncope    Syncope and collapse    Cellulitis    Left arm cellulitis    Hypotension    Intravenous drug abuse (HCC)    Chronic skin ulcer with fat layer exposed ([address]    Open wound    Hep C w/o coma, chronic (HCC)    Open wound of right hand except fingers with tendon involvement    MRSA (methicillin resistant Staphylococcus aureus) infection    LFT elevation     Bi lateral upper extremity cellulitis  Large open area of wound from IVDA on the Left fore arm area  Both hands dorsum ulcer and tissue loss from IVDA  ADHD  Polysubstance abuse  Depression  Hep C+Ve  Lft ELEVATION   MRSA infection in the past  X ray no osteomyelitis   IVDA    Given the on going skin changes and ulceration with drainage will cont IV abx and needs local wound care and may need debridement and wound care follow up but given his IVDA and Poor social situation follow ups may be difficult    Ulcerations secondary to on going IVDA and chemical skin injury and secondary infection       Will request wound   culture with any drainage      Anticipate oral abx when ready for home      Labs, Microbiology, Radiology and pertinent results from current hospitalization and care every where were reviewed by me as a part of the consultation. PLAN :  Cont IV Vancomycin x  1 gm Q 12 hrs  Cont IV Cefepime x 2 gm Q 12 hrs  Lft elevation from Hep C? HIV screen  -ve  Wound cx  Local wound care   Will be able to choose oral abx once looking better      Discussed with patient/Family and Nursing   Risk of Complications/Morbidity: High      Illness(es)/ Infection present that pose threat to bodily function. There is potential for severe exacerbation of infection/side effects of treatment. Therapy requires intensive monitoring for antimicrobial agent toxicity. Thanks for allowing me to participate in your patient's care please call me with any questions or concerns.     Dr. Theo Alexandre MD  [address] [place] [address] Physician  Phone: [phone]   Fax : [phone]

## 2023-01-20 NOTE — CARE COORDINATION
Surgery deferred consult to orhto. Ortho saw pt, agreeable with current tx plan of Vashe and to follow up in wound clinic. Clinic referral placed on MICHAEL.  Hector Dawson, MSN, RN, [facility]

## 2023-01-20 NOTE — PROGRESS NOTES
Pt alert and ambulating around his room and hoang.  Electronically signed by Nabil López RN on 1/20/2023 at 3:46 AM

## 2023-01-20 NOTE — CARE COORDINATION
SW consult noted stating that patient is homeless and an IVDU. We will meet with him momentarily and offer resources if he is agreeable. Respectfully submitted,    MACK Mahoney  [facility]   [phone]    Electronically signed by MACK Abdalla on 1/20/2023 at 11:42 AM

## 2023-01-21 VITALS
HEIGHT: 70 IN | DIASTOLIC BLOOD PRESSURE: 51 MMHG | SYSTOLIC BLOOD PRESSURE: 90 MMHG | BODY MASS INDEX: 19.95 KG/M2 | RESPIRATION RATE: 17 BRPM | HEART RATE: 52 BPM | OXYGEN SATURATION: 99 % | TEMPERATURE: 98.2 F | WEIGHT: 139.33 LBS

## 2023-01-21 LAB
ALBUMIN SERPL-MCNC: 3.6 G/DL (ref 3.4–5)
ALP BLD-CCNC: 124 U/L (ref 40–129)
ALT SERPL-CCNC: 180 U/L (ref 10–40)
ANION GAP SERPL CALCULATED.3IONS-SCNC: 9 MMOL/L (ref 3–16)
AST SERPL-CCNC: 82 U/L (ref 15–37)
BASOPHILS ABSOLUTE: 0 K/UL (ref 0–0.2)
BASOPHILS RELATIVE PERCENT: 0.5 %
BILIRUB SERPL-MCNC: <0.2 MG/DL (ref 0–1)
BILIRUBIN DIRECT: <0.2 MG/DL (ref 0–0.3)
BILIRUBIN, INDIRECT: ABNORMAL MG/DL (ref 0–1)
BUN BLDV-MCNC: 13 MG/DL (ref 7–20)
CALCIUM SERPL-MCNC: 9.1 MG/DL (ref 8.3–10.6)
CHLORIDE BLD-SCNC: 100 MMOL/L (ref 99–110)
CO2: 24 MMOL/L (ref 21–32)
CREAT SERPL-MCNC: 0.7 MG/DL (ref 0.9–1.3)
EOSINOPHILS ABSOLUTE: 0.1 K/UL (ref 0–0.6)
EOSINOPHILS RELATIVE PERCENT: 2.5 %
GFR SERPL CREATININE-BSD FRML MDRD: >60 ML/MIN/{1.73_M2}
GLUCOSE BLD-MCNC: 95 MG/DL (ref 70–99)
HCT VFR BLD CALC: 38.3 % (ref 40.5–52.5)
HEMOGLOBIN: 12.5 G/DL (ref 13.5–17.5)
LYMPHOCYTES ABSOLUTE: 1 K/UL (ref 1–5.1)
LYMPHOCYTES RELATIVE PERCENT: 29.5 %
MCH RBC QN AUTO: 25.5 PG (ref 26–34)
MCHC RBC AUTO-ENTMCNC: 32.5 G/DL (ref 31–36)
MCV RBC AUTO: 78.3 FL (ref 80–100)
MONOCYTES ABSOLUTE: 0.4 K/UL (ref 0–1.3)
MONOCYTES RELATIVE PERCENT: 13 %
NEUTROPHILS ABSOLUTE: 1.8 K/UL (ref 1.7–7.7)
NEUTROPHILS RELATIVE PERCENT: 54.5 %
PDW BLD-RTO: 14 % (ref 12.4–15.4)
PLATELET # BLD: 159 K/UL (ref 135–450)
PMV BLD AUTO: 7.7 FL (ref 5–10.5)
POTASSIUM REFLEX MAGNESIUM: 4.5 MMOL/L (ref 3.5–5.1)
POTASSIUM SERPL-SCNC: 4.5 MMOL/L (ref 3.5–5.1)
RBC # BLD: 4.89 M/UL (ref 4.2–5.9)
SODIUM BLD-SCNC: 133 MMOL/L (ref 136–145)
TOTAL PROTEIN: 6.6 G/DL (ref 6.4–8.2)
VANCOMYCIN RANDOM: 8.1 UG/ML
WBC # BLD: 3.3 K/UL (ref 4–11)

## 2023-01-21 PROCEDURE — 2580000003 HC RX 258: Performed by: EMERGENCY MEDICINE

## 2023-01-21 PROCEDURE — 80202 ASSAY OF VANCOMYCIN: CPT

## 2023-01-21 PROCEDURE — 6370000000 HC RX 637 (ALT 250 FOR IP): Performed by: STUDENT IN AN ORGANIZED HEALTH CARE EDUCATION/TRAINING PROGRAM

## 2023-01-21 PROCEDURE — 94760 N-INVAS EAR/PLS OXIMETRY 1: CPT

## 2023-01-21 PROCEDURE — 80048 BASIC METABOLIC PNL TOTAL CA: CPT

## 2023-01-21 PROCEDURE — 6360000002 HC RX W HCPCS: Performed by: EMERGENCY MEDICINE

## 2023-01-21 PROCEDURE — 36415 COLL VENOUS BLD VENIPUNCTURE: CPT

## 2023-01-21 PROCEDURE — 80076 HEPATIC FUNCTION PANEL: CPT

## 2023-01-21 PROCEDURE — 6360000002 HC RX W HCPCS: Performed by: STUDENT IN AN ORGANIZED HEALTH CARE EDUCATION/TRAINING PROGRAM

## 2023-01-21 PROCEDURE — 85025 COMPLETE CBC W/AUTO DIFF WBC: CPT

## 2023-01-21 PROCEDURE — 2580000003 HC RX 258: Performed by: STUDENT IN AN ORGANIZED HEALTH CARE EDUCATION/TRAINING PROGRAM

## 2023-01-21 RX ORDER — AMOXICILLIN AND CLAVULANATE POTASSIUM 500; 125 MG/1; MG/1
1 TABLET, FILM COATED ORAL 3 TIMES DAILY
Qty: 42 TABLET | Refills: 0 | Status: SHIPPED | OUTPATIENT
Start: 2023-01-21 | End: 2023-02-04

## 2023-01-21 RX ADMIN — METHADONE HYDROCHLORIDE 30 MG: 10 TABLET ORAL at 09:02

## 2023-01-21 RX ADMIN — SODIUM CHLORIDE 200 ML: 9 INJECTION, SOLUTION INTRAVENOUS at 07:01

## 2023-01-21 RX ADMIN — VANCOMYCIN HYDROCHLORIDE 1250 MG: 1.25 INJECTION, POWDER, LYOPHILIZED, FOR SOLUTION INTRAVENOUS at 10:03

## 2023-01-21 RX ADMIN — CEFEPIME 2000 MG: 2 INJECTION, POWDER, FOR SOLUTION INTRAVENOUS at 07:01

## 2023-01-21 ASSESSMENT — PAIN SCALES - GENERAL: PAINLEVEL_OUTOF10: 0

## 2023-01-21 NOTE — PROGRESS NOTES
900: pt not very interactive with RN during assessment or med pass. Intermittently pulling tele leads off. RN asked pt to not pull lines tight when turning. Pt denies any needs at this time, no s/s of distress. 1115: PCA called RN to room, states pt wants to leave AMA, RN confirms this information with pt. Pt is aware he is leaving against medical advice, and antibiotic treatment is not completed. 1122: Lamine Argue Dr. Severo Galas pt wants to leave AMA. MD already aware. Per pt, MD was previously at bedside and discussed leaving [facility]. Pt provided pharmacy location at the [facility], information sent via secure message to Dr. Severo Galas. 1130: AMA paperwork being completed    1140: Pt ambulated from room, all belongings removed at this time. Pt states he will be picking up abx.

## 2023-01-21 NOTE — FLOWSHEET NOTE
01/21/23 0845   Vitals   Temp 98.2 °F (36.8 °C)   Temp Source Oral   Heart Rate 51   Heart Rate Source Monitor   Resp 16   BP (!) 90/51   MAP (Calculated) 64   BP Location Left Arm   BP Upper/Lower Upper   Level of Consciousness 0   MEWS Score 2   Pain Assessment   Pain Assessment None - Denies Pain   Oxygen Therapy   SpO2 100 %   Pulse Oximetry Type Intermittent   Pulse Oximeter Device Mode Intermittent   O2 Device None (Room air)

## 2023-01-21 NOTE — DISCHARGE SUMMARY
Hospital Medicine Discharge Summary    Patient ID: Jefry Granado      Patient's PCP: No primary care provider on file. Admit Date: 1/19/2023     Discharge Date: 1/21/2023      Admitting Provider: Alejo Alva MD     Discharge Provider: Alejo Alva MD     Discharge Diagnoses: Active Hospital Problems    Diagnosis     Open wound of right hand except fingers with tendon involvement [S61.401A, S66.901A]      Priority: Medium    MRSA (methicillin resistant Staphylococcus aureus) infection [A49.02]      Priority: Medium    LFT elevation [R79.89]      Priority: Medium    Intravenous drug abuse ([facility] [address] [F19.10]      Priority: Medium    Chronic skin ulcer with fat layer exposed ([facility] [address] [L98.492]      Priority: Medium    Open wound [T14. 8XXA]      Priority: Medium    Cellulitis [T55.79]        The patient was seen and examined on day of discharge and this discharge summary is in conjunction with any daily progress note from day of discharge. Hospital Course:      28 y.o. male who presents to [facility] with worsening pain around lesions on right arm. Patient with a past medical history of IV drug use, hepatitis C, presented to emergency with right arm pain and nonhealing wounds for the past 5 months. Patient uses fentanyl daily, intravenously, 1 g total divided, patient uses clean needles every time however does not clean the area before injecting, patient injects in multiple areas, have been in the process of rehab, follows with the methadone clinic and has been on decreasing doses of methadone, expressed intent to quit. Denies any systemic symptoms, no fever. Admitted for upper limb non healing ulcers. Right arm cellulitis. Multiple nonhealing ulcers. IV drug use.    Patient presented to emergency with right arm pain, patient reports nonhealing ulcer for the past 5 months, 2 large ulcers on the right and one on the left, patient reports serosanguineous occasional discharge, no fever no pus. Seen by infectious disease and orthopedic surgery, no role for intervention at the time being, patient may need skin graft once his wounds had cleared up, will be followed with wound care as outpatient    Patient was plan to continue IV antibiotics however unfortunately was discharged [address], was prescribed Augmentin for 2 weeks, was given information about wound care center     Transaminitis  Mild with , AST 71. Smoking  Nicotine patch    Physical Exam Performed:     BP (!) 90/51   Pulse 52   Temp 98.2 °F (36.8 °C) (Oral)   Resp 17   Ht 5' 10\" (1.778 m)   Wt 139 lb 5.3 oz (63.2 kg)   SpO2 99%   BMI 19.99 kg/m²     General appearance: No apparent distress, appears stated age and cooperative. HEENT: Pupils equal, round, and reactive to light. Conjunctivae/corneas clear. Neck: Supple, with full range of motion. No jugular venous distention. Trachea midline. Respiratory:  Normal respiratory effort. Clear to auscultation, bilaterally without Rales/Wheezes/Rhonchi. Cardiovascular: Regular rate and rhythm with normal S1/S2 without murmurs, rubs or gallops. Abdomen: Soft, non-tender, non-distended with normal bowel sounds. Musculoskeletal: No clubbing, cyanosis or edema bilaterally. Full range of motion without deformity. Skin: Skin color, texture, turgor normal.  No rashes or lesions. Neurologic:  Neurovascularly intact without any focal sensory/motor deficits. Cranial nerves: II-XII intact, grossly non-focal.  Psychiatric: Alert and oriented, thought content appropriate, normal insight  Capillary Refill: Brisk, 3 seconds, normal   Peripheral Pulses: +2 palpable, equal bilaterally       y       Labs:  For convenience and continuity at follow-up the following most recent labs are provided:      CBC:    Lab Results   Component Value Date/Time    WBC 3.3 01/21/2023 07:58 AM    HGB 12.5 01/21/2023 07:58 AM    HCT 38.3 01/21/2023 07:58 AM  01/21/2023 07:58 AM       Renal:    Lab Results   Component Value Date/Time     01/21/2023 07:58 AM    K 4.5 01/21/2023 07:58 AM    K 4.5 01/21/2023 07:58 AM     01/21/2023 07:58 AM    CO2 24 01/21/2023 07:58 AM    BUN 13 01/21/2023 07:58 AM    CREATININE 0.7 01/21/2023 07:58 AM    CALCIUM 9.1 01/21/2023 07:58 AM    PHOS 2.5 09/18/2021 04:57 AM         Significant Diagnostic Studies    Radiology:   XR HAND RIGHT (MIN 3 VIEWS)   Final Result   Dorsal soft tissue swelling with soft tissue defect. XR HAND LEFT (MIN 3 VIEWS)   Final Result   Soft tissue swelling with dorsal soft tissue defect and soft tissue gas. XR RADIUS ULNA LEFT (2 VIEWS)   Final Result   Diffuse soft tissue swelling with a dorsal soft tissue defect and potential   soft tissue gas. Consults:     PHARMACY TO DOSE VANCOMYCIN  IP CONSULT TO HOSPITALIST  IP CONSULT TO PHARMACY  IP CONSULT TO INFECTIOUS DISEASES  PHARMACY TO DOSE VANCOMYCIN  IP CONSULT TO SOCIAL WORK  IP CONSULT TO SOCIAL WORK    Disposition: Home AGAINST MEDICAL ADVICE    Condition at Discharge: Unstable    Discharge Instructions/Follow-up:    -Was given Augmentin as outpatient.  -Wound care information on discharge papers    Code Status:  Full Code     Activity: activity as tolerated    Diet: regular diet      Discharge Medications:     Discharge Medication List as of 1/21/2023 12:06 PM             Details   methadone (DOLOPHINE) 10 MG/ML solution Take 30 mg by mouth daily. Historical Med      ondansetron (ZOFRAN ODT) 4 MG disintegrating tablet Take 1 tablet by mouth every 8 hours as needed for Nausea, Disp-20 tablet, R-0Normal             Time Spent on discharge: 35 in the examination, evaluation, counseling and review of medications and discharge plan. Signed:    Maris Thompson MD   1/21/2023      Thank you No primary care provider on file. for the opportunity to be involved in this patient's care.  If you have any questions or concerns, please feel free to contact me at [phone].

## 2023-01-21 NOTE — PLAN OF CARE
Problem: Discharge Planning  Goal: Discharge to home or other facility with appropriate resources  1/21/2023 0030 by Bill Hung RN  Outcome: Progressing  Case management involved to assess appropriate level of care and resources. 1/20/2023 1127 by Mirian Reyes RN  Outcome: Progressing  [address] (Taken 1/19/2023 2201 by Garlon Schaumann, RN)  Discharge to home or other facility with appropriate resources: Identify barriers to discharge with patient and caregiver     Problem: Pain  Goal: Verbalizes/displays adequate comfort level or baseline comfort level  1/21/2023 0030 by Bill Hung RN  Outcome: Progressing  Pain being managed with PRN analgesics per MD orders. Patient able to express presence and absence of pain and rate pain appropriately using numerical scale. 1/20/2023 1127 by Mirian Reyes RN  Outcome: Progressing     Problem: Safety - Adult  Goal: Free from fall injury  1/21/2023 0030 by Bill Hung RN  Outcome: Progressing  Fall risk assessment completed every shift. All precautions in place. Patient has call light with in reach at all times. Room free from clutter.  Patient aware to call for assistance when getting up.    1/20/2023 1127 by Mirian Reyes RN  Outcome: Progressing     Problem: ABCDS Injury Assessment  Goal: Absence of physical injury  1/21/2023 0030 by Bill Hung RN  Outcome: Progressing  Patient free of physical injury  Flowsheets (Taken 1/20/2023 1136 by Mirian Reyes RN)  Absence of Physical Injury: Implement safety measures based on patient assessment  1/20/2023 1127 by Mirian Reyes RN  Outcome: Progressing

## 2023-01-21 NOTE — PROGRESS NOTES
Clinical Pharmacy Note  Vancomycin Consult    Mack Davenport is a 28 y.o. male ordered Vancomycin for cellulitis; consult received from Dr. Balbina Chavez to manage therapy. Also receiving Cefepime. Allergies:  Clindamycin, Clindamycin/lincomycin, and Azithromycin     Temp max:  Temp (24hrs), Av °F (36.7 °C), Min:97.5 °F (36.4 °C), Max:98.3 °F (36.8 °C)      Recent Labs     23  0615 23  0758   WBC 5.7 3.3*         Recent Labs     23  0615 23  0551 23  0758   BUN 16 14 13   CREATININE 0.8* 0.7* 0.7*           Intake/Output Summary (Last 24 hours) at 2023 0946  Last data filed at 2023 1000  Gross per 24 hour   Intake 720 ml   Output --   Net 720 ml       Culture Results:      Ht Readings from Last 1 Encounters:   23 5' 10\" (1.778 m)          Wt Readings from Last 1 Encounters:   23 139 lb 5.3 oz (63.2 kg)         Estimated Creatinine Clearance: 140 mL/min (A) (based on SCr of 0.7 mg/dL (L)). Assessment/Plan:  Day # 3 of vancomycin of 5 day order  Vancomycin random level 8.1mg/L after 4 doses of Vancomycin. Will increase to Vancomycin 1250mg q12h  Goal -600  Predicted  predicted trough 12.6mg/L      Thank you for the consult.    Edda Obrien, [facility], 2023 9:46 AM

## 2023-01-21 NOTE — PROGRESS NOTES
[x] Discussed risk factors for leaving hospital against medical advice, up to and including risk of death  [x] Patient is NOT on an involuntary hold  [x] Patient is alert and oriented  [x] Attempt made to identify patient's reason for wanting to leave AMA   [x] Attempt made to reduce any barriers to patient remaining inpatient   [x] Patient verbalizes understanding that he/she is leaving prior to completion of treatment   [x] Patient's provider Dr. Gregg Sprague has been notified of patient's desire to leave AMA  [x] Prescriptions provided, and reviewed with patient to be transmitted to [facility] on [address].   [] Patient declined to wait for physician to write prescriptions  [] Provider is not providing prescriptions  [x] Attempt made to arrange follow up care - patient has no PCP. Advised to follow up at a clinic such as [facility] if symptoms not improving or to come back to ED if symptoms worsen.    [x] Attempt made to identify patient's arrangements for safe transportation from facility  [x] AMA form signed  [] Patient refused to sign AMA form  [x] IV accesses discontinued as appropriate  [x] Patient is invited to return to the emergency department if further treatment if needed   [x] Charge nurse/nursing supervisor Rafia Oneil/ Marcelle Fernando have been informed            Electronically signed by Colleen Rao RN on 1/21/2023 at 11:40 AM     Electronically signed by Bessie Kohli RN on 1/21/2023 at 11:46 AM

## 2023-03-28 ENCOUNTER — HOSPITAL ENCOUNTER (EMERGENCY)
Age: 28
Discharge: HOME | End: 2023-03-28
Attending: EMERGENCY MEDICINE
Payer: MEDICAID

## 2023-03-28 VITALS
TEMPERATURE: 97.5 F | SYSTOLIC BLOOD PRESSURE: 107 MMHG | RESPIRATION RATE: 12 BRPM | HEIGHT: 62 IN | HEART RATE: 64 BPM | OXYGEN SATURATION: 97 % | DIASTOLIC BLOOD PRESSURE: 65 MMHG | WEIGHT: 144.18 LBS | BODY MASS INDEX: 26.53 KG/M2

## 2023-03-28 DIAGNOSIS — L98.491 SKIN ULCER OF FOREARM, LIMITED TO BREAKDOWN OF SKIN (HCC): ICD-10-CM

## 2023-03-28 DIAGNOSIS — L98.492 SKIN ULCER OF HAND WITH FAT LAYER EXPOSED (HCC): Primary | ICD-10-CM

## 2023-03-28 PROCEDURE — 99283 EMERGENCY DEPT VISIT LOW MDM: CPT

## 2023-03-28 PROCEDURE — 6370000000 HC RX 637 (ALT 250 FOR IP): Performed by: EMERGENCY MEDICINE

## 2023-03-28 RX ORDER — CEPHALEXIN 500 MG/1
500 CAPSULE ORAL ONCE
Status: COMPLETED | OUTPATIENT
Start: 2023-03-28 | End: 2023-03-28

## 2023-03-28 RX ORDER — SULFAMETHOXAZOLE AND TRIMETHOPRIM 800; 160 MG/1; MG/1
1 TABLET ORAL ONCE
Status: COMPLETED | OUTPATIENT
Start: 2023-03-28 | End: 2023-03-28

## 2023-03-28 RX ORDER — SULFAMETHOXAZOLE AND TRIMETHOPRIM 800; 160 MG/1; MG/1
1 TABLET ORAL 2 TIMES DAILY
Qty: 28 TABLET | Refills: 0 | Status: SHIPPED | OUTPATIENT
Start: 2023-03-28 | End: 2023-04-07

## 2023-03-28 RX ORDER — CEPHALEXIN 500 MG/1
500 CAPSULE ORAL 4 TIMES DAILY
Qty: 28 CAPSULE | Refills: 0 | Status: SHIPPED | OUTPATIENT
Start: 2023-03-28 | End: 2023-04-11

## 2023-03-28 RX ADMIN — SULFAMETHOXAZOLE AND TRIMETHOPRIM 1 TABLET: 800; 160 TABLET ORAL at 01:30

## 2023-03-28 RX ADMIN — CEPHALEXIN 500 MG: 500 CAPSULE ORAL at 01:30

## 2023-03-28 ASSESSMENT — PAIN - FUNCTIONAL ASSESSMENT: PAIN_FUNCTIONAL_ASSESSMENT: NONE - DENIES PAIN

## 2023-03-28 NOTE — ED NOTES
Hand wounds cleansed with Hibiclens and water. Padded dry, non adhering dressing applied over wounds, covered with 4x4 and secured with ace wraps. Pt tolerated well. Verbal and written wound care instructions given at this time. Pt denied having any more questions.       Stoney Camarillo RN  03/28/23 0087

## 2023-04-01 NOTE — ED PROVIDER NOTES
[address] ENCOUNTER        Pt Name: Chung Diaz  MRN: [id number]  [place] 1995  Date of evaluation: 3/28/2023  Provider: Wiley High MD  PCP: No primary care provider on file. Note Started: 1:39 PM EDT 4/1/23    CHIEF COMPLAINT       Chief Complaint   Patient presents with    Wound Check     Comes in with open wounds bilateral top of hands. Pt has been seen before for the same issue. At the time was encouraged to be admitted but left AMA. Pt comes in today saying \"I am ready to stay\". Denies any fevers       HISTORY OF PRESENT ILLNESS: 1 or more Elements   History From: Patient        Mack Frazier is a 33 y.o. male who presents for evaluation of chronic wounds to the bilateral hands and forearm. Patient is a history of IV drug use. He reports that he is currently receiving methadone but continues to use IV drugs. He states he has continued to inject into the sites of his ulcerations. Patient denies fevers. Denies loss of motor function or sensation. Patient has been seen and admitted to the hospital multiple times for treatment of these wounds and does not follow-up with wound care. He is not following up with infectious disease. Nursing Notes were all reviewed and agreed with or any disagreements were addressed in the HPI. REVIEW OF SYSTEMS :      Review of Systems    Positives and Pertinent negatives as per HPI. SURGICAL HISTORY     Past Surgical History:   Procedure Laterality Date    NASAL SEPTUM SURGERY      TONSILLECTOMY      TYMPANOSTOMY TUBE PLACEMENT         [address]       Discharge Medication List as of 3/28/2023  1:33 AM        CONTINUE these medications which have NOT CHANGED    Details   methadone (DOLOPHINE) 10 MG/ML solution Take 30 mg by mouth daily. Historical Med      ondansetron (ZOFRAN ODT) 4 MG disintegrating tablet Take 1 tablet by mouth every 8 hours as needed for Nausea, Disp-20 tablet,

## 2023-08-14 ENCOUNTER — HOSPITAL ENCOUNTER (EMERGENCY)
Age: 28
Discharge: HOME | End: 2023-08-14
Attending: EMERGENCY MEDICINE
Payer: MEDICAID

## 2023-08-14 VITALS
RESPIRATION RATE: 16 BRPM | BODY MASS INDEX: 22.31 KG/M2 | DIASTOLIC BLOOD PRESSURE: 84 MMHG | OXYGEN SATURATION: 100 % | HEART RATE: 84 BPM | TEMPERATURE: 98.3 F | SYSTOLIC BLOOD PRESSURE: 139 MMHG | WEIGHT: 122 LBS

## 2023-08-14 DIAGNOSIS — L24.9 IRRITANT DERMATITIS: ICD-10-CM

## 2023-08-14 DIAGNOSIS — S41.109A: Primary | ICD-10-CM

## 2023-08-14 PROCEDURE — 6360000002 HC RX W HCPCS: Performed by: EMERGENCY MEDICINE

## 2023-08-14 PROCEDURE — 96372 THER/PROPH/DIAG INJ SC/IM: CPT

## 2023-08-14 PROCEDURE — 99284 EMERGENCY DEPT VISIT MOD MDM: CPT

## 2023-08-14 RX ORDER — BACITRACIN ZINC AND POLYMYXIN B SULFATE 500; 1000 [USP'U]/G; [USP'U]/G
OINTMENT TOPICAL ONCE
Status: DISCONTINUED | OUTPATIENT
Start: 2023-08-14 | End: 2023-08-14 | Stop reason: HOSPADM

## 2023-08-14 RX ORDER — DEXAMETHASONE SODIUM PHOSPHATE 4 MG/ML
6 INJECTION, SOLUTION INTRA-ARTICULAR; INTRALESIONAL; INTRAMUSCULAR; INTRAVENOUS; SOFT TISSUE ONCE
Status: COMPLETED | OUTPATIENT
Start: 2023-08-14 | End: 2023-08-14

## 2023-08-14 RX ADMIN — DEXAMETHASONE SODIUM PHOSPHATE 6 MG: 4 INJECTION, SOLUTION INTRAMUSCULAR; INTRAVENOUS at 01:22

## 2023-08-14 ASSESSMENT — ENCOUNTER SYMPTOMS
ABDOMINAL PAIN: 0
NAUSEA: 0
VOMITING: 0
SHORTNESS OF BREATH: 0

## 2023-08-14 ASSESSMENT — PAIN - FUNCTIONAL ASSESSMENT: PAIN_FUNCTIONAL_ASSESSMENT: NONE - DENIES PAIN

## 2023-08-14 NOTE — DISCHARGE INSTRUCTIONS
Be sure to contact the wound care clinic listed in your paperwork to arrange for a follow up visit. In the long term you would probably benefit from a type of therapy called contingency management. You can ask the staff at the rehab center about setting you up with this or you can contact the clinic that offers this listed in your paperwork. Once every 24-48 hours:  Remove your bandage(s)  Clean your wounds thoroughly with soap and water  Gently dry the areas  Apply topical antibiotic ointment  Cover your wounds with the supplies we provided you with    If you have any new or worsening issues after going home don't hesitate to return here for reevaluation at any time 24/7!

## 2023-08-29 ENCOUNTER — HOSPITAL ENCOUNTER (EMERGENCY)
Age: 28
Discharge: HOME | End: 2023-08-30
Attending: EMERGENCY MEDICINE
Payer: MEDICAID

## 2023-08-29 VITALS
HEART RATE: 75 BPM | HEIGHT: 70 IN | BODY MASS INDEX: 18.31 KG/M2 | DIASTOLIC BLOOD PRESSURE: 91 MMHG | WEIGHT: 127.87 LBS | SYSTOLIC BLOOD PRESSURE: 131 MMHG | OXYGEN SATURATION: 98 % | RESPIRATION RATE: 14 BRPM | TEMPERATURE: 98.4 F

## 2023-08-29 DIAGNOSIS — S41.109D MULTIPLE OPEN WOUNDS OF UPPER EXTREMITY, UNSPECIFIED LATERALITY, SUBSEQUENT ENCOUNTER: ICD-10-CM

## 2023-08-29 DIAGNOSIS — J02.9 ACUTE PHARYNGITIS, UNSPECIFIED ETIOLOGY: Primary | ICD-10-CM

## 2023-08-29 PROCEDURE — 99283 EMERGENCY DEPT VISIT LOW MDM: CPT

## 2023-08-29 ASSESSMENT — PAIN DESCRIPTION - DESCRIPTORS: DESCRIPTORS: SHARP

## 2023-08-29 ASSESSMENT — PAIN DESCRIPTION - PAIN TYPE: TYPE: ACUTE PAIN

## 2023-08-29 ASSESSMENT — PAIN DESCRIPTION - LOCATION: LOCATION: THROAT

## 2023-08-29 ASSESSMENT — PAIN SCALES - GENERAL: PAINLEVEL_OUTOF10: 6

## 2023-08-29 ASSESSMENT — PAIN - FUNCTIONAL ASSESSMENT: PAIN_FUNCTIONAL_ASSESSMENT: 0-10

## 2023-08-30 PROCEDURE — 6370000000 HC RX 637 (ALT 250 FOR IP): Performed by: EMERGENCY MEDICINE

## 2023-08-30 RX ORDER — CEPHALEXIN 500 MG/1
500 CAPSULE ORAL ONCE
Status: COMPLETED | OUTPATIENT
Start: 2023-08-30 | End: 2023-08-30

## 2023-08-30 RX ORDER — ACETAMINOPHEN 325 MG/1
650 TABLET ORAL ONCE
Status: COMPLETED | OUTPATIENT
Start: 2023-08-30 | End: 2023-08-30

## 2023-08-30 RX ORDER — SULFAMETHOXAZOLE AND TRIMETHOPRIM 800; 160 MG/1; MG/1
1 TABLET ORAL ONCE
Status: COMPLETED | OUTPATIENT
Start: 2023-08-30 | End: 2023-08-30

## 2023-08-30 RX ORDER — SULFAMETHOXAZOLE AND TRIMETHOPRIM 800; 160 MG/1; MG/1
1 TABLET ORAL 2 TIMES DAILY
Qty: 14 TABLET | Refills: 0 | Status: SHIPPED | OUTPATIENT
Start: 2023-08-30 | End: 2023-09-06

## 2023-08-30 RX ORDER — CEPHALEXIN 500 MG/1
500 CAPSULE ORAL 4 TIMES DAILY
Qty: 28 CAPSULE | Refills: 0 | Status: SHIPPED | OUTPATIENT
Start: 2023-08-30 | End: 2023-09-06

## 2023-08-30 RX ADMIN — CEPHALEXIN 500 MG: 500 CAPSULE ORAL at 00:18

## 2023-08-30 RX ADMIN — ACETAMINOPHEN 650 MG: 325 TABLET ORAL at 00:18

## 2023-08-30 RX ADMIN — SULFAMETHOXAZOLE AND TRIMETHOPRIM 1 TABLET: 800; 160 TABLET ORAL at 00:19

## 2023-08-30 ASSESSMENT — PAIN DESCRIPTION - DESCRIPTORS
DESCRIPTORS: SHARP
DESCRIPTORS: SHARP

## 2023-08-30 ASSESSMENT — PAIN DESCRIPTION - LOCATION
LOCATION: THROAT
LOCATION: THROAT

## 2023-08-30 ASSESSMENT — PAIN SCALES - GENERAL
PAINLEVEL_OUTOF10: 6
PAINLEVEL_OUTOF10: 6

## 2023-08-30 ASSESSMENT — PAIN DESCRIPTION - PAIN TYPE: TYPE: ACUTE PAIN

## 2023-08-30 ASSESSMENT — PAIN - FUNCTIONAL ASSESSMENT: PAIN_FUNCTIONAL_ASSESSMENT: 0-10

## 2023-08-30 NOTE — ED TRIAGE NOTES
Pt states sore throat for 4 days, hurts to swallow and fatigue. No fever. Pt has wounds on his arms and hands, states he had these wounds for 1 year, states they are getting better.

## 2023-09-07 ENCOUNTER — HOSPITAL ENCOUNTER (EMERGENCY)
Age: 28
Discharge: HOME | End: 2023-09-07
Payer: MEDICAID

## 2023-09-07 VITALS
DIASTOLIC BLOOD PRESSURE: 97 MMHG | HEART RATE: 70 BPM | OXYGEN SATURATION: 97 % | TEMPERATURE: 98.1 F | HEIGHT: 70 IN | BODY MASS INDEX: 18.24 KG/M2 | WEIGHT: 127.43 LBS | RESPIRATION RATE: 16 BRPM | SYSTOLIC BLOOD PRESSURE: 133 MMHG

## 2023-09-07 DIAGNOSIS — L98.9 SKIN LESION: ICD-10-CM
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
## 2023-09-07 PROCEDURE — 6360000002 HC RX W HCPCS: Performed by: PHYSICIAN ASSISTANT

## 2023-09-07 PROCEDURE — 99284 EMERGENCY DEPT VISIT MOD MDM: CPT

## 2023-09-07 PROCEDURE — 6370000000 HC RX 637 (ALT 250 FOR IP): Performed by: PHYSICIAN ASSISTANT

## 2023-09-07 PROCEDURE — 96372 THER/PROPH/DIAG INJ SC/IM: CPT

## 2023-09-07 RX ORDER — CETIRIZINE HYDROCHLORIDE 10 MG/1
10 TABLET ORAL ONCE
Status: COMPLETED | OUTPATIENT
Start: 2023-09-07 | End: 2023-09-07

## 2023-09-07 RX ORDER — DOXYCYCLINE HYCLATE 100 MG
100 TABLET ORAL ONCE
Status: COMPLETED | OUTPATIENT
Start: 2023-09-07 | End: 2023-09-07

## 2023-09-07 RX ORDER — DEXAMETHASONE SODIUM PHOSPHATE 4 MG/ML
8 INJECTION, SOLUTION INTRA-ARTICULAR; INTRALESIONAL; INTRAMUSCULAR; INTRAVENOUS; SOFT TISSUE ONCE
Status: COMPLETED | OUTPATIENT
Start: 2023-09-07 | End: 2023-09-07

## 2023-09-07 RX ORDER — CETIRIZINE HYDROCHLORIDE 10 MG/1
10 TABLET ORAL DAILY
Qty: 30 TABLET | Refills: 0 | Status: SHIPPED | OUTPATIENT
Start: 2023-09-07 | End: 2023-10-07

## 2023-09-07 RX ORDER — DOXYCYCLINE HYCLATE 100 MG
100 TABLET ORAL 2 TIMES DAILY
Qty: 20 TABLET | Refills: 0 | Status: SHIPPED | OUTPATIENT
Start: 2023-09-07 | End: 2023-09-17

## 2023-09-07 RX ADMIN — DEXAMETHASONE SODIUM PHOSPHATE 8 MG: 4 INJECTION INTRA-ARTICULAR; INTRALESIONAL; INTRAMUSCULAR; INTRAVENOUS; SOFT TISSUE at 18:46

## 2023-09-07 RX ADMIN — DOXYCYCLINE HYCLATE 100 MG: 100 TABLET, COATED ORAL at 18:46

## 2023-09-07 RX ADMIN — CETIRIZINE HYDROCHLORIDE 10 MG: 10 TABLET, FILM COATED ORAL at 18:45

## 2023-09-07 ASSESSMENT — LIFESTYLE VARIABLES
HOW MANY STANDARD DRINKS CONTAINING ALCOHOL DO YOU HAVE ON A TYPICAL DAY: PATIENT DOES NOT DRINK
HOW OFTEN DO YOU HAVE A DRINK CONTAINING ALCOHOL: NEVER
HOW MANY STANDARD DRINKS CONTAINING ALCOHOL DO YOU HAVE ON A TYPICAL DAY: PATIENT DOES NOT DRINK
HOW OFTEN DO YOU HAVE A DRINK CONTAINING ALCOHOL: NEVER

## 2023-09-07 ASSESSMENT — PAIN - FUNCTIONAL ASSESSMENT
PAIN_FUNCTIONAL_ASSESSMENT: NONE - DENIES PAIN
PAIN_FUNCTIONAL_ASSESSMENT: NONE - DENIES PAIN

## 2023-09-07 NOTE — ED PROVIDER NOTES
[address] [facility] ENCOUNTER        Pt Name: Jerel Monae  MRN: [id number]  [address] [facility] 1995  Date of evaluation: 9/7/2023  Provider: Lisa Nichols PA-C  PCP: No primary care provider on file. Note Started: 6:41 PM EDT 9/7/23      MINI. I have evaluated this patient. CHIEF COMPLAINT       Chief Complaint   Patient presents with    Rash     Generalized x1wk, pt states rash is poison ivy. HISTORY OF PRESENT ILLNESS: 1 or more Elements     History From: Patient    Mack Brooks is a 33 y.o. male who presents to the emergency department for evaluation of poison ivy and insect bites. Patient cuts grass. Numerous bites on legs and arms. Also exposure to poison ivy. Patient also with history of IV fentanyl use with xylene. He has open wounds as related and occurred about a year ago. Actually in a healing phase and not concerningly infected at this time. Patient has had previous similar rash and insect bites at which time they were treated with antibiotics and a steroid shot. Nursing Notes were all reviewed and agreed with or any disagreements were addressed in the HPI. REVIEW OF SYSTEMS :      Review of Systems    Positives and Pertinent negatives as per HPI.      SURGICAL HISTORY     Past Surgical History:   Procedure Laterality Date    NASAL SEPTUM SURGERY      TONSILLECTOMY      TYMPANOSTOMY TUBE PLACEMENT          [facility]       Discharge Medication List as of 9/7/2023  6:47 PM        CONTINUE these medications which have NOT CHANGED    Details   benzocaine-Menthol (CEPACOL INSTAMAX) 15-20 MG lozenge Take 1 lozenge by mouth every 2 hours as needed for Sore Throat, Disp-16 lozenge, R-0Normal             ALLERGIES     Clindamycin, Clindamycin/lincomycin, and Azithromycin    FAMILYHISTORY       Family History   Problem Relation Age of Onset    Diabetes Mother     Depression Mother     Substance Abuse Mother     High Blood

## 2023-09-07 NOTE — DISCHARGE INSTRUCTIONS
In the emergency room I did give you Zyrtec 10 mg p.o., first dose doxycycline 100 mg p.o. and Decadron 8 mg IM. I did send prescriptions to [address] [place] [address] for additional medication.

## 2023-09-07 NOTE — ED NOTES
Pt d/c home with AVS no s.s of distress noted pt denies questions about f/u script x 2 sent to Jemma Fowler RN  09/07/23 7362

## 2024-01-29 ENCOUNTER — HOSPITAL ENCOUNTER (OUTPATIENT)
Age: 29
Discharge: HOME | End: 2024-01-29
Payer: MEDICAID

## 2024-01-29 ENCOUNTER — HOSPITAL ENCOUNTER (OUTPATIENT)
Dept: GENERAL RADIOLOGY | Age: 29
Discharge: HOME | End: 2024-01-29
Payer: MEDICAID

## 2024-01-29 PROCEDURE — 71046 X-RAY EXAM CHEST 2 VIEWS: CPT

## 2024-03-28 ENCOUNTER — HOSPITAL ENCOUNTER (EMERGENCY)
Age: 29
Discharge: HOME | End: 2024-03-28
Payer: MEDICAID

## 2024-03-28 VITALS
SYSTOLIC BLOOD PRESSURE: 112 MMHG | OXYGEN SATURATION: 100 % | TEMPERATURE: 98.3 F | DIASTOLIC BLOOD PRESSURE: 76 MMHG | RESPIRATION RATE: 14 BRPM | HEART RATE: 78 BPM

## 2024-03-28 DIAGNOSIS — B34.9 VIRAL ILLNESS: Primary | ICD-10-CM

## 2024-03-28 LAB
AMPHETAMINES UR QL SCN>1000 NG/ML: NORMAL
BARBITURATES UR QL SCN>200 NG/ML: NORMAL
BENZODIAZ UR QL SCN>200 NG/ML: NORMAL
CANNABINOIDS UR QL SCN>50 NG/ML: NORMAL
COCAINE UR QL SCN: NORMAL
DRUG SCREEN COMMENT UR-IMP: NORMAL
FENTANYL SCREEN, URINE: NORMAL
FLUAV RNA UPPER RESP QL NAA+PROBE: NEGATIVE
FLUBV AG NPH QL: NEGATIVE
METHADONE UR QL SCN>300 NG/ML: NORMAL
OPIATES UR QL SCN>300 NG/ML: NORMAL
OXYCODONE UR QL SCN: NORMAL
PCP UR QL SCN>25 NG/ML: NORMAL
PH UR STRIP: 6 [PH]
S PYO AG THROAT QL: NEGATIVE
SARS-COV-2 RDRP RESP QL NAA+PROBE: NOT DETECTED

## 2024-03-28 PROCEDURE — 99283 EMERGENCY DEPT VISIT LOW MDM: CPT

## 2024-03-28 PROCEDURE — 87635 SARS-COV-2 COVID-19 AMP PRB: CPT

## 2024-03-28 PROCEDURE — 87081 CULTURE SCREEN ONLY: CPT

## 2024-03-28 PROCEDURE — 6360000002 HC RX W HCPCS

## 2024-03-28 PROCEDURE — 80307 DRUG TEST PRSMV CHEM ANLYZR: CPT

## 2024-03-28 PROCEDURE — 87804 INFLUENZA ASSAY W/OPTIC: CPT

## 2024-03-28 PROCEDURE — 6370000000 HC RX 637 (ALT 250 FOR IP)

## 2024-03-28 PROCEDURE — 87880 STREP A ASSAY W/OPTIC: CPT

## 2024-03-28 RX ORDER — BENZONATATE 100 MG/1
100-200 CAPSULE ORAL 3 TIMES DAILY PRN
Qty: 42 CAPSULE | Refills: 0 | Status: SHIPPED | OUTPATIENT
Start: 2024-03-28 | End: 2024-04-04

## 2024-03-28 RX ORDER — ACETAMINOPHEN 500 MG
500 TABLET ORAL 4 TIMES DAILY PRN
Qty: 360 TABLET | Refills: 1 | Status: SHIPPED | OUTPATIENT
Start: 2024-03-28

## 2024-03-28 RX ORDER — BENZOCAINE/MENTHOL 6 MG-10 MG
LOZENGE MUCOUS MEMBRANE
Qty: 1.5 G | Refills: 0 | Status: SHIPPED | OUTPATIENT
Start: 2024-03-28 | End: 2024-04-04

## 2024-03-28 RX ORDER — DEXAMETHASONE 4 MG/1
10 TABLET ORAL ONCE
Status: COMPLETED | OUTPATIENT
Start: 2024-03-28 | End: 2024-03-28

## 2024-03-28 RX ORDER — ACETAMINOPHEN 325 MG/1
650 TABLET ORAL ONCE
Status: COMPLETED | OUTPATIENT
Start: 2024-03-28 | End: 2024-03-28

## 2024-03-28 RX ADMIN — ACETAMINOPHEN 650 MG: 325 TABLET ORAL at 14:42

## 2024-03-28 RX ADMIN — DEXAMETHASONE 10 MG: 4 TABLET ORAL at 14:41

## 2024-03-28 ASSESSMENT — PAIN SCALES - GENERAL
PAINLEVEL_OUTOF10: 6
PAINLEVEL_OUTOF10: 6

## 2024-03-28 ASSESSMENT — PAIN - FUNCTIONAL ASSESSMENT
PAIN_FUNCTIONAL_ASSESSMENT: 0-10
PAIN_FUNCTIONAL_ASSESSMENT: NONE - DENIES PAIN

## 2024-03-28 ASSESSMENT — PAIN DESCRIPTION - DESCRIPTORS
DESCRIPTORS: ACHING
DESCRIPTORS: ACHING

## 2024-03-28 ASSESSMENT — PAIN DESCRIPTION - PAIN TYPE: TYPE: ACUTE PAIN

## 2024-03-28 ASSESSMENT — PAIN DESCRIPTION - LOCATION: LOCATION: THROAT
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
## 2024-03-28 NOTE — DISCHARGE INSTRUCTIONS
COVID, flu, strep were negative, it is likely present with a viral illness.  Urine drug screen was negative.  Tessalon was prescribed for the cough, and Tylenol to be taken as needed.  Referral for PCP was provided, please call and establish care.  If you notice any new or worsening symptoms please return to the ED.

## 2024-03-28 NOTE — ED TRIAGE NOTES
Sore throat x 2 days . Thinks he might have strep he states pain when swallowing   He also would like a urine test he thinks he might have been exposed to drugs

## 2024-03-30 LAB — S PYO THROAT QL CULT: NORMAL

## 2024-05-12 NOTE — ED NOTES
Actively vomiting.  EMD informed     Nita Del Rio RN  09/15/21 2041
Flailing and thrashing has decreased.       Heriberto Aguila RN  09/15/21 3172
Multiple IV insertions attempted without success. Track marks noted on bilat hands and arms  Labs obtained and sent to lab. Pt kicking legs and rolling around bed.       Marylu Kwon RN  09/15/21 3240 [address]  09/15/21 6327
Straight cathed.  Urine specimen obtained and sent to lab     Marylu Kwon RN  09/15/21 5817
To CT Scan     Chelsey Kirkpatrick RN  09/15/21 3709
18

## 2024-06-20 ENCOUNTER — HOSPITAL ENCOUNTER (EMERGENCY)
Age: 29
Discharge: HOME | End: 2024-06-20
Attending: EMERGENCY MEDICINE
Payer: COMMERCIAL

## 2024-06-20 VITALS
OXYGEN SATURATION: 99 % | HEIGHT: 70 IN | DIASTOLIC BLOOD PRESSURE: 99 MMHG | SYSTOLIC BLOOD PRESSURE: 134 MMHG | RESPIRATION RATE: 18 BRPM | TEMPERATURE: 98.4 F | HEART RATE: 92 BPM | WEIGHT: 125.88 LBS | BODY MASS INDEX: 18.02 KG/M2

## 2024-06-20 DIAGNOSIS — S01.511A LIP LACERATION, INITIAL ENCOUNTER: Primary | ICD-10-CM

## 2024-06-20 DIAGNOSIS — S09.90XA MINOR HEAD INJURY, INITIAL ENCOUNTER: ICD-10-CM

## 2024-06-20 PROCEDURE — 6370000000 HC RX 637 (ALT 250 FOR IP): Performed by: EMERGENCY MEDICINE

## 2024-06-20 PROCEDURE — 99283 EMERGENCY DEPT VISIT LOW MDM: CPT

## 2024-06-20 RX ORDER — AMOXICILLIN AND CLAVULANATE POTASSIUM 875; 125 MG/1; MG/1
1 TABLET, FILM COATED ORAL EVERY 12 HOURS SCHEDULED
Status: DISCONTINUED | OUTPATIENT
Start: 2024-06-20 | End: 2024-06-20

## 2024-06-20 RX ORDER — AMOXICILLIN AND CLAVULANATE POTASSIUM 875; 125 MG/1; MG/1
1 TABLET, FILM COATED ORAL 2 TIMES DAILY
Qty: 20 TABLET | Refills: 0 | Status: SHIPPED | OUTPATIENT
Start: 2024-06-20 | End: 2024-06-30

## 2024-06-20 RX ORDER — AMOXICILLIN AND CLAVULANATE POTASSIUM 875; 125 MG/1; MG/1
1 TABLET, FILM COATED ORAL ONCE
Status: COMPLETED | OUTPATIENT
Start: 2024-06-20 | End: 2024-06-20

## 2024-06-20 RX ADMIN — AMOXICILLIN AND CLAVULANATE POTASSIUM 1 TABLET: 875; 125 TABLET, FILM COATED ORAL at 21:25

## 2024-06-20 ASSESSMENT — PAIN - FUNCTIONAL ASSESSMENT
PAIN_FUNCTIONAL_ASSESSMENT: NONE - DENIES PAIN
PAIN_FUNCTIONAL_ASSESSMENT: NONE - DENIES PAIN
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
## 2024-06-21 NOTE — ED NOTES
Discharge instructions provided. Follow-up care encouraged. Medication regimen education provided. Denies pain. Home wound care teaching done.
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
## 2024-06-21 NOTE — DISCHARGE INSTRUCTIONS
Your lip has been repaired with dissolvable sutures.  You do not have to have these removed.  He should come back in 1 week to this department for wound recheck.

## 2024-09-20 ENCOUNTER — APPOINTMENT (OUTPATIENT)
Dept: GENERAL RADIOLOGY | Age: 29
End: 2024-09-20
Payer: COMMERCIAL

## 2024-09-20 ENCOUNTER — HOSPITAL ENCOUNTER (EMERGENCY)
Age: 29
Discharge: HOME | End: 2024-09-20
Attending: EMERGENCY MEDICINE
Payer: COMMERCIAL

## 2024-09-20 VITALS
WEIGHT: 143.3 LBS | DIASTOLIC BLOOD PRESSURE: 71 MMHG | TEMPERATURE: 98 F | SYSTOLIC BLOOD PRESSURE: 116 MMHG | RESPIRATION RATE: 16 BRPM | BODY MASS INDEX: 20.52 KG/M2 | HEIGHT: 70 IN | HEART RATE: 80 BPM | OXYGEN SATURATION: 99 %

## 2024-09-20 DIAGNOSIS — Z23 TETANUS TOXOID VACCINATION ADMINISTERED AT CURRENT VISIT: ICD-10-CM

## 2024-09-20 DIAGNOSIS — S62.025A CLOSED NONDISPLACED FRACTURE OF MIDDLE THIRD OF SCAPHOID BONE OF LEFT WRIST, INITIAL ENCOUNTER: ICD-10-CM
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
## 2024-09-20 DIAGNOSIS — T07.XXXA MULTIPLE ABRASIONS: ICD-10-CM

## 2024-09-20 PROCEDURE — 99284 EMERGENCY DEPT VISIT MOD MDM: CPT

## 2024-09-20 PROCEDURE — 6370000000 HC RX 637 (ALT 250 FOR IP): Performed by: EMERGENCY MEDICINE

## 2024-09-20 PROCEDURE — 6360000002 HC RX W HCPCS: Performed by: EMERGENCY MEDICINE

## 2024-09-20 PROCEDURE — 90471 IMMUNIZATION ADMIN: CPT | Performed by: EMERGENCY MEDICINE

## 2024-09-20 PROCEDURE — 73110 X-RAY EXAM OF WRIST: CPT

## 2024-09-20 PROCEDURE — 90715 TDAP VACCINE 7 YRS/> IM: CPT | Performed by: EMERGENCY MEDICINE

## 2024-09-20 PROCEDURE — 29125 APPL SHORT ARM SPLINT STATIC: CPT

## 2024-09-20 RX ORDER — NEOMYCIN/BACITRACIN/POLYMYXINB 3.5-400-5K
OINTMENT (GRAM) TOPICAL ONCE
Status: COMPLETED | OUTPATIENT
Start: 2024-09-20 | End: 2024-09-20

## 2024-09-20 RX ORDER — TRAMADOL HYDROCHLORIDE 50 MG/1
50 TABLET ORAL ONCE
Status: COMPLETED | OUTPATIENT
Start: 2024-09-20 | End: 2024-09-20

## 2024-09-20 RX ORDER — HYDROCODONE BITARTRATE AND ACETAMINOPHEN 5; 325 MG/1; MG/1
1 TABLET ORAL EVERY 6 HOURS PRN
Qty: 12 TABLET | Refills: 0 | Status: SHIPPED | OUTPATIENT
Start: 2024-09-20 | End: 2024-09-23

## 2024-09-20 RX ADMIN — TRAMADOL HYDROCHLORIDE 50 MG: 50 TABLET ORAL at 05:38

## 2024-09-20 RX ADMIN — BACITRACIN ZINC, NEOMYCIN, POLYMYXIN B SULFAT: 5000; 3.5; 4 OINTMENT TOPICAL at 05:24

## 2024-09-20 RX ADMIN — TETANUS TOXOID, REDUCED DIPHTHERIA TOXOID AND ACELLULAR PERTUSSIS VACCINE, ADSORBED 0.5 ML: 5; 2.5; 8; 8; 2.5 SUSPENSION INTRAMUSCULAR at 03:23

## 2024-09-20 ASSESSMENT — PAIN SCALES - GENERAL
PAINLEVEL_OUTOF10: 10
PAINLEVEL_OUTOF10: 10

## 2024-09-20 ASSESSMENT — PAIN - FUNCTIONAL ASSESSMENT
PAIN_FUNCTIONAL_ASSESSMENT: NONE - DENIES PAIN
PAIN_FUNCTIONAL_ASSESSMENT: 0-10

## 2024-09-20 ASSESSMENT — PAIN DESCRIPTION - ORIENTATION: ORIENTATION: LEFT;RIGHT

## 2024-09-23 ENCOUNTER — TELEPHONE (OUTPATIENT)
Dept: ORTHOPEDIC SURGERY | Age: 29
End: 2024-09-23

## 2024-09-24 ENCOUNTER — OFFICE VISIT (OUTPATIENT)
Dept: ORTHOPEDIC SURGERY | Age: 29
End: 2024-09-24
Payer: COMMERCIAL

## 2024-09-24 DIAGNOSIS — F17.200 CURRENT SMOKER: ICD-10-CM

## 2024-09-24 DIAGNOSIS — S62.002A CLOSED DISPLACED FRACTURE OF SCAPHOID OF LEFT WRIST, UNSPECIFIED PORTION OF SCAPHOID, INITIAL ENCOUNTER: Primary | ICD-10-CM

## 2024-09-24 PROCEDURE — L3809 WHFO W/O JOINTS PRE OTS: HCPCS | Performed by: ORTHOPAEDIC SURGERY

## 2024-09-24 PROCEDURE — 25630 CLTX CARPL FX W/O MNPJ EA B1: CPT | Performed by: ORTHOPAEDIC SURGERY

## 2024-09-24 PROCEDURE — 99406 BEHAV CHNG SMOKING 3-10 MIN: CPT | Performed by: ORTHOPAEDIC SURGERY

## 2024-09-24 PROCEDURE — G8427 DOCREV CUR MEDS BY ELIG CLIN: HCPCS | Performed by: ORTHOPAEDIC SURGERY

## 2024-09-24 PROCEDURE — 4004F PT TOBACCO SCREEN RCVD TLK: CPT | Performed by: ORTHOPAEDIC SURGERY

## 2024-09-24 PROCEDURE — G8420 CALC BMI NORM PARAMETERS: HCPCS | Performed by: ORTHOPAEDIC SURGERY

## 2024-09-24 PROCEDURE — 99203 OFFICE O/P NEW LOW 30 MIN: CPT | Performed by: ORTHOPAEDIC SURGERY

## 2024-09-24 RX ORDER — TRAMADOL HYDROCHLORIDE 50 MG/1
50 TABLET ORAL EVERY 8 HOURS PRN
Qty: 9 TABLET | Refills: 0 | Status: SHIPPED | OUTPATIENT
Start: 2024-09-24 | End: 2024-09-27

## 2025-01-04 ENCOUNTER — HOSPITAL ENCOUNTER (EMERGENCY)
Age: 30
Discharge: HOME | End: 2025-01-04
Payer: COMMERCIAL

## 2025-01-04 VITALS
WEIGHT: 145 LBS | DIASTOLIC BLOOD PRESSURE: 77 MMHG | OXYGEN SATURATION: 100 % | HEART RATE: 64 BPM | SYSTOLIC BLOOD PRESSURE: 111 MMHG | BODY MASS INDEX: 20.76 KG/M2 | RESPIRATION RATE: 18 BRPM | HEIGHT: 70 IN | TEMPERATURE: 97.2 F

## 2025-01-04 DIAGNOSIS — T14.8XXA SKIN EXCORIATION: Primary | ICD-10-CM

## 2025-01-04 PROCEDURE — 99283 EMERGENCY DEPT VISIT LOW MDM: CPT

## 2025-01-04 ASSESSMENT — ENCOUNTER SYMPTOMS
BLOOD IN STOOL: 0
ABDOMINAL PAIN: 0
CONSTIPATION: 0
VOMITING: 0
NAUSEA: 0
DIARRHEA: 0

## 2025-01-04 NOTE — ED TRIAGE NOTES
Pt c/o rash to rectum that began x7 days prior. Pt denies pain. Pt states he has been taking stool softeners due to constipation, and has had episodes of diarrhea. Pt denies use of new laundry detergent, soaps or lotions.

## 2025-01-04 NOTE — DISCHARGE INSTRUCTIONS
Examination is more consistent with excoriation.  No concern for rash.    I did send a topical zinc ointment to your pharmacy.   and apply as directed after wound care twice per day by washing with antibacterial soap and water.    Continue the stool softener that you were prescribed prior to your emergency department visit    Follow-up with primary care

## 2025-01-05 NOTE — ED PROVIDER NOTES
[facility]  EMERGENCY DEPARTMENT ENCOUNTER        Pt Name: Mack Delacruz  MRN: [id number]  Birthdate 1995  Date of evaluation: 1/4/2025  Provider: RACHID Mclaughlin CNP  PCP: No primary care provider on file.  Note Started: 9:08 PM EST 1/4/25      MINI. I have evaluated this patient.        CHIEF COMPLAINT       Chief Complaint   Patient presents with    Rash     Pt c/o rash to rectum that began x7 days prior. Pt denies pain. Pt states he has been taking stool softeners due to constipation, and has had episodes of diarrhea.       HISTORY OF PRESENT ILLNESS: 1 or more Elements     History From: Patient, EMR review    Chief Complaint: Rectal rash    Mack Delacruz is a 29 y.o. male who presents to the emergency department via self-referral private vehicle.  Concern for a rash around the rectum.  First noted about a week ago.  Does not itch and does not burn it.  Not particularly painful.  No treatments prior to arrival.  No change in bowel or bladder habits.  States that he was recently placed on Colace 3 days ago and is not sure if this would cause or correlate with his symptoms    Nursing Notes were all reviewed and agreed with or any disagreements were addressed in the HPI.    REVIEW OF SYSTEMS :      Review of Systems   Constitutional:  Negative for chills, fatigue and fever.   Gastrointestinal:  Negative for abdominal pain, blood in stool, constipation, diarrhea, nausea and vomiting.   Skin:  Positive for rash.   All other systems reviewed and are negative.      Positives and Pertinent negatives as per HPI.     SURGICAL HISTORY     Past Surgical History:   Procedure Laterality Date    NASAL SEPTUM SURGERY      TONSILLECTOMY      TYMPANOSTOMY TUBE PLACEMENT         CURRENTMEDICATIONS       Discharge Medication List as of 1/4/2025  3:50 PM        CONTINUE these medications which have NOT CHANGED    Details   acetaminophen (TYLENOL) 500 MG tablet Take 1 tablet by

## 2025-07-06 ENCOUNTER — HOSPITAL ENCOUNTER (EMERGENCY)
Age: 30
Discharge: HOME | End: 2025-07-06
Attending: EMERGENCY MEDICINE
Payer: COMMERCIAL

## 2025-07-06 VITALS
HEIGHT: 71 IN | TEMPERATURE: 98.4 F | BODY MASS INDEX: 19.07 KG/M2 | OXYGEN SATURATION: 99 % | WEIGHT: 136.24 LBS | HEART RATE: 71 BPM | RESPIRATION RATE: 15 BRPM | SYSTOLIC BLOOD PRESSURE: 127 MMHG | DIASTOLIC BLOOD PRESSURE: 80 MMHG

## 2025-07-06 DIAGNOSIS — L25.5 CONTACT DERMATITIS DUE TO PLANT: Primary | ICD-10-CM

## 2025-07-06 DIAGNOSIS — Z76.0 ENCOUNTER FOR MEDICATION REFILL: ICD-10-CM

## 2025-07-06 PROCEDURE — 99284 EMERGENCY DEPT VISIT MOD MDM: CPT

## 2025-07-06 PROCEDURE — 96372 THER/PROPH/DIAG INJ SC/IM: CPT

## 2025-07-06 PROCEDURE — 6360000002 HC RX W HCPCS: Performed by: EMERGENCY MEDICINE

## 2025-07-06 RX ORDER — METHYLPREDNISOLONE SODIUM SUCCINATE 125 MG/2ML
60 INJECTION INTRAMUSCULAR; INTRAVENOUS ONCE
Status: COMPLETED | OUTPATIENT
Start: 2025-07-06 | End: 2025-07-06

## 2025-07-06 RX ORDER — METHYLPREDNISOLONE 4 MG/1
TABLET ORAL
Qty: 1 KIT | Refills: 0 | Status: SHIPPED | OUTPATIENT
Start: 2025-07-06

## 2025-07-06 RX ORDER — BUPRENORPHINE AND NALOXONE 8; 2 MG/1; MG/1
1 FILM, SOLUBLE BUCCAL; SUBLINGUAL DAILY
Qty: 3 FILM | Refills: 0 | Status: SHIPPED | OUTPATIENT
Start: 2025-07-06 | End: 2025-07-09

## 2025-07-06 RX ADMIN — METHYLPREDNISOLONE SODIUM SUCCINATE 60 MG: 125 INJECTION, POWDER, LYOPHILIZED, FOR SOLUTION INTRAMUSCULAR; INTRAVENOUS at 05:58

## 2025-07-06 ASSESSMENT — PAIN - FUNCTIONAL ASSESSMENT
PAIN_FUNCTIONAL_ASSESSMENT: NONE - DENIES PAIN
PAIN_FUNCTIONAL_ASSESSMENT: NONE - DENIES PAIN

## 2025-07-06 NOTE — ED PROVIDER NOTES
to be included in the SEP-1 Core Measure due to severe sepsis or septic shock?   No     Exclusion criteria - the patient is NOT to be included for SEP-1 Core Measure due to:  2+ SIRS criteria are not met      REASSESSMENT/ MEDICAL DECISION MAKING            Patient was given the following medications:   Medications   methylPREDNISolone sodium succ (SOLU-MEDROL) injection 60 mg (60 mg IntraMUSCular Given 7/6/25 0558)        Follow-up with your Suboxone clinic on Monday for reexamination and refill of medication.    Follow-up with your outpatient detox program as soon as possible.    Do not scratch.  Recommend cool showers.  May take Benadryl as directed for any itching.  Do not drive or operate machinery while taking Benadryl.  May cause drowsiness.    Drink plenty of fluids.  Follow-up with a primary care physician in 1 to 2 days for reexamination.  Call today for an appointment.  If condition worsens or new symptoms develop, return immediately to the emergency department.     I am the primary attending of record.    CRITICAL CARE TIME   Total Critical Care time was 0 minutes, excluding separately reportable procedures.  There was a high probability of clinically significant/life threatening deterioration in the patient's condition which required my urgent intervention.      CONSULTS:  None    PROCEDURES:  Unless otherwise noted below, none     Procedures        FINAL IMPRESSION      1. Contact dermatitis due to plant    2. Encounter for medication refill          DISPOSITION/PLAN   DISPOSITION Discharge - Pending Orders Complete 07/06/2025 05:44:45 AM      PATIENT REFERRED TO:  [facility]  [address] [place] [address]  [phone]  Call today      [facility] Referral  Call [phone] for an appointment  Call today        DISCHARGE MEDICATIONS:  New Prescriptions    BUPRENORPHINE-NALOXONE (SUBOXONE) 8-2 MG FILM SL FILM    Place 1 Film under the tongue daily for 3 days. Max Daily

## 2025-07-06 NOTE — DISCHARGE INSTRUCTIONS
Follow-up with your Suboxone clinic on Monday for reexamination and refill of medication.    Follow-up with your outpatient detox program as soon as possible.    Do not scratch.  Recommend cool showers.  May take Benadryl as directed for any itching.  Do not drive or operate machinery while taking Benadryl.  May cause drowsiness.    Drink plenty of fluids.  Follow-up with a primary care physician in 1 to 2 days for reexamination.  Call today for an appointment.  If condition worsens or new symptoms develop, return immediately to the emergency department.

## 2025-07-12 ENCOUNTER — HOSPITAL ENCOUNTER (EMERGENCY)
Age: 30
Discharge: HOME | End: 2025-07-12
Payer: COMMERCIAL

## 2025-07-12 ENCOUNTER — APPOINTMENT (OUTPATIENT)
Dept: GENERAL RADIOLOGY | Age: 30
End: 2025-07-12
Payer: COMMERCIAL

## 2025-07-12 ENCOUNTER — APPOINTMENT (OUTPATIENT)
Dept: CT IMAGING | Age: 30
End: 2025-07-12
Payer: COMMERCIAL

## 2025-07-12 VITALS
OXYGEN SATURATION: 100 % | BODY MASS INDEX: 19 KG/M2 | RESPIRATION RATE: 18 BRPM | HEIGHT: 70 IN | HEART RATE: 98 BPM | DIASTOLIC BLOOD PRESSURE: 80 MMHG | TEMPERATURE: 98.2 F | SYSTOLIC BLOOD PRESSURE: 134 MMHG | WEIGHT: 132.72 LBS

## 2025-07-12 DIAGNOSIS — S93.401A SPRAIN OF RIGHT ANKLE, UNSPECIFIED LIGAMENT, INITIAL ENCOUNTER: Primary | ICD-10-CM

## 2025-07-12 DIAGNOSIS — S09.90XA INJURY OF HEAD, INITIAL ENCOUNTER: ICD-10-CM

## 2025-07-12 PROCEDURE — 70450 CT HEAD/BRAIN W/O DYE: CPT

## 2025-07-12 PROCEDURE — 73610 X-RAY EXAM OF ANKLE: CPT

## 2025-07-12 PROCEDURE — 99284 EMERGENCY DEPT VISIT MOD MDM: CPT

## 2025-07-12 PROCEDURE — 6360000002 HC RX W HCPCS: Performed by: EMERGENCY MEDICINE

## 2025-07-12 RX ORDER — NALOXONE HYDROCHLORIDE 1 MG/ML
1 INJECTION INTRAMUSCULAR; INTRAVENOUS; SUBCUTANEOUS ONCE
Status: COMPLETED | OUTPATIENT
Start: 2025-07-12 | End: 2025-07-12

## 2025-07-12 RX ADMIN — NALOXONE HYDROCHLORIDE 1 MG: 1 INJECTION PARENTERAL at 07:30

## 2025-07-12 ASSESSMENT — PAIN - FUNCTIONAL ASSESSMENT: PAIN_FUNCTIONAL_ASSESSMENT: 0-10

## 2025-07-12 ASSESSMENT — PAIN DESCRIPTION - LOCATION: LOCATION: ANKLE

## 2025-07-12 ASSESSMENT — PAIN SCALES - GENERAL: PAINLEVEL_OUTOF10: 10
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
## 2025-07-12 NOTE — ED NOTES
Pt discharged, cussing at staff as he is leaving the building. Ambulates with a steady easy gait. Alert and oriented, pt given resources for addiction counseling and help, Pt threw all his papers do the ground in the lobby.

## 2025-07-12 NOTE — ED NOTES
Discharge and education instructions reviewed. Patient could not stay awake while trying to go over the discharge instructions. This RN asked patient if he had taken anything. Pt stated \"No.\" Pt started snoring and went right back to sleep. I attempted to wake the pt up again and he would not wake up. 1 mg of narcan IN given. PT woke up and asked, \"What the fuck was that?\" I stated I had to give him narcan as he would not wake up. PT became more alert after narcan was given and became verbally aggressive, throwing his discharge paper work and cussing at staff. Discharged per EDMD.